# Patient Record
Sex: FEMALE | Race: WHITE | NOT HISPANIC OR LATINO | Employment: OTHER | URBAN - METROPOLITAN AREA
[De-identification: names, ages, dates, MRNs, and addresses within clinical notes are randomized per-mention and may not be internally consistent; named-entity substitution may affect disease eponyms.]

---

## 2017-05-24 NOTE — PATIENT INSTRUCTIONS
Discuss getting a shingles (Zostavax) vaccine and a whooping cough (Tdap) vaccine from your pharmacist, or schedule an ancillary shot visit here.  Some insurances do not cover the cost of these vaccines.      Please schedule a DEXA bone scan for osteoporosis testing at your convenience.  Call our appointment line at [phone] or go to [email or web address].    Jocelin Guajardo MD      [facility]    If you have any questions regarding to your visit please contact your care team:       Team Red:   Clinic Hours Telephone Number   Dr. Jocelin Mcmillan  (pediatrics)  Yesenia Hodge NP 7am-7pm  Monday - Thursday   7am-5pm  Fridays  [phone] (fax)    Edita POND  [phone]   Urgent Care - [place] and [place] Monday-Friday  [place] - 11am-8pm  Saturday-Sunday  Both sites - 9am-5pm  [phone] - [facility]  [phone] [facility]       What options do I have for visits at the clinic other than the traditional office visit?  To expand how we care for you, many of our providers are utilizing electronic visits (e-visits) and telephone visits, when medically appropriate, for interactions with their patients rather than a visit in the clinic.   We also offer nurse visits for many medical concerns. Just like any other service, we will bill your insurance company for this type of visit based on time spent on the phone with your provider. Not all insurance companies cover these visits. Please check with your medical insurance if this type of visit is covered. You will be responsible for any charges that are not paid by your insurance.      E-visits via [employer].:  generally incur a $35.00 fee.  Telephone visits:  Time spent on the phone: *charged based on time that is spent on the phone in increments of 10 minutes. Estimated cost:   5-10 mins $30.00   11-20 mins. $59.00   21-30 mins. $85.00     As always, Thank you for trusting us with your health care  needs!        Preventive Health Recommendations  Female Ages 65 +    Yearly exam:     See your health care provider every year in order to  o Review health changes.   o Discuss preventive care.    o Review your medicines if your doctor has prescribed any.      You no longer need a yearly Pap test unless you've had an abnormal Pap test in the past 10 years. If you have vaginal symptoms, such as bleeding or discharge, be sure to talk with your provider about a Pap test.      Every 1 to 2 years, have a mammogram.  If you are over 69, talk with your health care provider about whether or not you want to continue having screening mammograms.      Every 10 years, have a colonoscopy. Or, have a yearly FIT test (stool test). These exams will check for colon cancer.       Have a cholesterol test every 5 years, or more often if your doctor advises it.       Have a diabetes test (fasting glucose) every three years. If you are at risk for diabetes, you should have this test more often.       At age 65, have a bone density scan (DEXA) to check for osteoporosis (brittle bone disease).    Shots:    Get a flu shot each year.    Get a tetanus shot every 10 years.    Talk to your doctor about your pneumonia vaccines. There are now two you should receive - Pneumovax (PPSV 23) and Prevnar (PCV 13).    Talk to your doctor about the shingles vaccine.    Talk to your doctor about the hepatitis B vaccine.    Nutrition:     Eat at least 5 servings of fruits and vegetables each day.      Eat whole-grain bread, whole-wheat pasta and brown rice instead of white grains and rice.      Talk to your provider about Calcium and Vitamin D.     Lifestyle    Exercise at least 150 minutes a week (30 minutes a day, 5 days a week). This will help you control your weight and prevent disease.      Limit alcohol to one drink per day.      No smoking.       Wear sunscreen to prevent skin cancer.       See your dentist twice a year for an exam and  cleaning.      See your eye doctor every 1 to 2 years to screen for conditions such as glaucoma, macular degeneration and cataracts.    Discharged by Nina Mg MA.

## 2017-06-13 ENCOUNTER — OFFICE VISIT (OUTPATIENT)
Dept: FAMILY MEDICINE | Facility: CLINIC | Age: 82
End: 2017-06-13
Payer: COMMERCIAL

## 2017-06-13 VITALS
SYSTOLIC BLOOD PRESSURE: 134 MMHG | BODY MASS INDEX: 25.52 KG/M2 | DIASTOLIC BLOOD PRESSURE: 82 MMHG | TEMPERATURE: 99.1 F | HEART RATE: 87 BPM | HEIGHT: 63 IN | WEIGHT: 144 LBS | OXYGEN SATURATION: 96 %

## 2017-06-13 DIAGNOSIS — Z71.89 ADVANCED DIRECTIVES, COUNSELING/DISCUSSION: ICD-10-CM

## 2017-06-13 DIAGNOSIS — Z78.0 ASYMPTOMATIC POSTMENOPAUSAL STATUS: ICD-10-CM

## 2017-06-13 DIAGNOSIS — R06.09 EXERTIONAL DYSPNEA: ICD-10-CM

## 2017-06-13 DIAGNOSIS — Z00.00 WELLNESS EXAMINATION: Primary | ICD-10-CM

## 2017-06-13 PROBLEM — Z13.6 CARDIOVASCULAR SCREENING; LDL GOAL LESS THAN 160: Status: ACTIVE | Noted: 2017-06-13

## 2017-06-13 PROCEDURE — 99387 INIT PM E/M NEW PAT 65+ YRS: CPT | Performed by: FAMILY MEDICINE

## 2017-06-13 PROCEDURE — 93000 ELECTROCARDIOGRAM COMPLETE: CPT | Performed by: FAMILY MEDICINE

## 2017-06-13 NOTE — MR AVS SNAPSHOT
After Visit Summary   6/13/2017    Brenna Watkins    MRN: [id number]           Patient Information     Date Of Birth          1935        Visit Information        Provider Department      6/13/2017 2:00 PM Jocelin Guajardo MD [facility]        Today's Diagnoses     Wellness examination    -  1    Exertional dyspnea        Asymptomatic postmenopausal status        Advanced directives, counseling/discussion          Care Instructions    Discuss getting a shingles (Zostavax) vaccine and a whooping cough (Tdap) vaccine from your pharmacist, or schedule an ancillary shot visit here.  Some insurances do not cover the cost of these vaccines.      Please schedule a DEXA bone scan for osteoporosis testing at your convenience.  Call our appointment line at [phone] or go to [email or web address].    Jocelin Guajardo MD      [facility]    If you have any questions regarding to your visit please contact your care team:       Team Red:   Clinic Hours Telephone Number   Dr. Jocelin Mcmillan  (pediatrics)  Yesenia Hodge NP 7am-7pm  Monday - Thursday   7am-5pm  Fridays  [phone] (fax)    Edita POND  [phone]   Urgent Care - [place] and [place] Monday-Friday  [place] - 11am-8pm  Saturday-Sunday  Both sites - 9am-5pm  [phone] - [place]   [phone] - [place]       What options do I have for visits at the clinic other than the traditional office visit?  To expand how we care for you, many of our providers are utilizing electronic visits (e-visits) and telephone visits, when medically appropriate, for interactions with their patients rather than a visit in the clinic.   We also offer nurse visits for many medical concerns. Just like any other service, we will bill your insurance company for this type of visit based on time spent on the phone with your provider. Not all insurance companies cover these visits.  Please check with your medical insurance if this type of visit is covered. You will be responsible for any charges that are not paid by your insurance.      E-visits via [employer]:  generally incur a $35.00 fee.  Telephone visits:  Time spent on the phone: *charged based on time that is spent on the phone in increments of 10 minutes. Estimated cost:   5-10 mins $30.00   11-20 mins. $59.00   21-30 mins. $85.00     As always, Thank you for trusting us with your health care needs!        Preventive Health Recommendations  Female Ages 65 +    Yearly exam:     See your health care provider every year in order to  o Review health changes.   o Discuss preventive care.    o Review your medicines if your doctor has prescribed any.      You no longer need a yearly Pap test unless you've had an abnormal Pap test in the past 10 years. If you have vaginal symptoms, such as bleeding or discharge, be sure to talk with your provider about a Pap test.      Every 1 to 2 years, have a mammogram.  If you are over 69, talk with your health care provider about whether or not you want to continue having screening mammograms.      Every 10 years, have a colonoscopy. Or, have a yearly FIT test (stool test). These exams will check for colon cancer.       Have a cholesterol test every 5 years, or more often if your doctor advises it.       Have a diabetes test (fasting glucose) every three years. If you are at risk for diabetes, you should have this test more often.       At age 65, have a bone density scan (DEXA) to check for osteoporosis (brittle bone disease).    Shots:    Get a flu shot each year.    Get a tetanus shot every 10 years.    Talk to your doctor about your pneumonia vaccines. There are now two you should receive - Pneumovax (PPSV 23) and Prevnar (PCV 13).    Talk to your doctor about the shingles vaccine.    Talk to your doctor about the hepatitis B vaccine.    Nutrition:     Eat at least 5 servings of fruits and vegetables each  day.      Eat whole-grain bread, whole-wheat pasta and brown rice instead of white grains and rice.      Talk to your provider about Calcium and Vitamin D.     Lifestyle    Exercise at least 150 minutes a week (30 minutes a day, 5 days a week). This will help you control your weight and prevent disease.      Limit alcohol to one drink per day.      No smoking.       Wear sunscreen to prevent skin cancer.       See your dentist twice a year for an exam and cleaning.      See your eye doctor every 1 to 2 years to screen for conditions such as glaucoma, macular degeneration and cataracts.    Discharged by Nina Mg MA.          Follow-ups after your visit        Follow-up notes from your care team     Return in about 1 year (around 6/13/2018), or if symptoms worsen or fail to improve, for Wellness visit.      Future tests that were ordered for you today     Open Future Orders        Priority Expected Expires Ordered    XR Chest 2 Views Routine 6/13/2017 6/13/2018 6/13/2017    Exercise Stress Echocardiogram Routine  6/13/2018 6/13/2017    DEXA HIP/PELVIS/SPINE - Future Routine  5/24/2018 6/13/2017            Who to contact     If you have questions or need follow up information about today's clinic visit or your schedule please contact [facility] directly at [phone].  Normal or non-critical lab and imaging results will be communicated to you by [email or web address], letter or phone within 4 business days after the clinic has received the results. If you do not hear from us within 7 days, please contact the clinic through [email or web address] or phone. If you have a critical or abnormal lab result, we will notify you by phone as soon as possible.  Submit refill requests through [employer] or call your pharmacy and they will forward the refill request to us. Please allow 3 business days for your refill to be completed.          Additional Information About Your Visit        [employer] Information     [employer] lets you send  "messages to your doctor, view your test results, renew your prescriptions, schedule appointments and more. To sign up, go to [email or web address] . Click on \"Log in\" on the left side of the screen, which will take you to the Welcome page. Then click on \"Sign up Now\" on the right side of the page.     You will be asked to enter the access code listed below, as well as some personal information. Please follow the directions to create your username and password.     Your access code is: [email or web address]  Expires: 2017  3:36 PM     Your access code will  in 90 days. If you need help or a new code, please call your [facility] clinic or [phone].        Care EveryWhere ID     This is your [facility] EveryWhere ID. This could be used by other organizations to access your [facility] medical records  [id number]        Your Vitals Were     Pulse Temperature Height Pulse Oximetry BMI (Body Mass Index)       87 99.1  F (37.3  C) (Oral) 5' 3\" (1.6 m) 96% 25.51 kg/m2        Blood Pressure from Last 3 Encounters:   17 134/82    Weight from Last 3 Encounters:   17 144 lb (65.3 kg)              We Performed the Following     CBC with platelets differential     EKG 12-lead complete w/read - Clinics        Primary Care Provider Office Phone # Fax #    Jocelin Guajardo [phone]       [address] [address]        Thank you!     Thank you for choosing [facility]  for your care. Our goal is always to provide you with excellent care. Hearing back from our patients is one way we can continue to improve our services. Please take a few minutes to complete the written survey that you may receive in the mail after your visit with us. Thank you!             Your Updated Medication List - Protect others around you: Learn how to safely use, store and throw away your medicines at www.disposemymeds.org.      Notice  As of 2017  3:36 PM    You have not " been prescribed any medications.

## 2017-06-13 NOTE — PROGRESS NOTES
SUBJECTIVE:                                                            Brenna Watkins is a 82 year old female who presents for Preventive Visit.    She has mild exertional dyspnea for months.     Healthy Habits:    Do you get at least three servings of calcium containing foods daily (dairy, green leafy vegetables, etc.)? tries    Amount of exercise or daily activities, outside of work: 7 day(s) per week    Problems taking medications regularly not applicable    Medication side effects: No    Have you had an eye exam in the past two years? yes    Do you see a dentist twice per year? Once per year    Do you have sleep apnea, excessive snoring or daytime drowsiness?no    COGNITIVE SCREEN  1) Repeat 3 items (Banana, Sunrise, Chair)    2) Clock draw: ABNORMAL   3) 3 item recall: Recalls 1 object   Results: ABNORMAL clock, 1-2 items recalled: PROBABLE COGNITIVE IMPAIRMENT, **INFORM PROVIDER**  Mini-CogTM Copyright S [place]. Licensed by the author for use in [facility]; reprinted with permission (zechariah@[facility]). All rights reserved.        Amount of exercise or daily activities, outside of work: 7 day(s) per week    Problems taking medications regularly No    Medication side effects: none    Diet: regular (no restrictions)    Social History   Substance Use Topics     Smoking status: Never Smoker     Smokeless tobacco: Not on file     Alcohol use 0.6 - 1.2 oz/week     1 - 2 Glasses of wine per week        Reviewed and updated as needed this visit by clinical staff  Tobacco  Allergies  Meds  Problems  Med Hx  Surg Hx  Fam Hx  Soc Hx          Reviewed and updated as needed this visit by Provider  Tobacco  Allergies  Meds  Problems  Med Hx  Surg Hx  Fam Hx  Soc Hx         Social History   Substance Use Topics     Smoking status: Never Smoker     Smokeless tobacco: Not on file     Alcohol use 0.6 - 1.2 oz/week     1 - 2 Glasses of wine per week       The patient does not drink >3 drinks per day nor  >7 drinks per week.    Today's PHQ-2 Score:   PHQ-2 ( 1999 Pfizer) 6/13/2017   Q1: Little interest or pleasure in doing things 0   Q2: Feeling down, depressed or hopeless 0   PHQ-2 Score 0       Do you feel safe in your environment - Yes    Do you have a Health Care Directive?: Yes: Patient states has Advance Directive and will bring in a copy to clinic.    Current providers sharing in care for this patient include:   Patient Care Team:  Jocelin Guajardo MD as PCP - General (Family Practice)      Hearing impairment: No    Ability to successfully perform activities of daily living: Yes, no assistance needed     Fall risk:  Fallen 2 or more times in the past year?: No  Any fall with injury in the past year?: No    Home safety:  none identified      The following health maintenance items are reviewed in Epic and correct as of today:  Health Maintenance   Topic Date Due     FALL RISK ASSESSMENT  02/08/2002     DEXA SCAN SCREENING (SYSTEM ASSIGNED)  02/08/2002     INFLUENZA VACCINE (SYSTEM ASSIGNED)  09/01/2017     ADVANCE DIRECTIVE PLANNING Q5 YRS  06/13/2022     TETANUS IMMUNIZATION (SYSTEM ASSIGNED)  06/13/2027     PNEUMOCOCCAL  Addressed         ROS:  C: NEGATIVE for fever, chills, change in weight  I: NEGATIVE for worrisome rashes, moles or lesions  E: NEGATIVE for vision changes or irritation  E/M: NEGATIVE for ear, mouth and throat problems  RESP:as above  B: NEGATIVE for masses, tenderness or discharge  CV: NEGATIVE for chest pain, palpitations or peripheral edema  GI: NEGATIVE for nausea, abdominal pain, heartburn, or change in bowel habits  : NEGATIVE for frequency, dysuria, or hematuria  M: NEGATIVE for significant arthralgias or myalgia  N: NEGATIVE for weakness, dizziness or paresthesias  E: NEGATIVE for temperature intolerance, skin/hair changes  H: NEGATIVE for bleeding problems  P: NEGATIVE for changes in mood or affect    Problem list, Medication list, Allergies, and Medical/Social/Surgical histories  "reviewed in [facility] and updated as appropriate.  OBJECTIVE:                                                            /82 (BP Location: Right arm, Patient Position: Chair, Cuff Size: Adult Regular)  Pulse 87  Temp 99.1  F (37.3  C) (Oral)  Ht 5' 3\" (1.6 m)  Wt 144 lb (65.3 kg)  SpO2 96%  BMI 25.51 kg/m2 Estimated body mass index is 25.51 kg/(m^2) as calculated from the following:    Height as of this encounter: 5' 3\" (1.6 m).    Weight as of this encounter: 144 lb (65.3 kg).  EXAM:   GENERAL: healthy, alert and no distress  EYES: Eyes grossly normal to inspection, PERRL and conjunctivae and sclerae normal  HENT: ear canals and TM's normal, nose and mouth without ulcers or lesions  NECK: no adenopathy, no asymmetry, masses, or scars and thyroid normal to palpation  RESP: lungs clear to auscultation - no rales, rhonchi or wheezes  CV: regular rate and rhythm, normal S1 S2, no S3 or S4, no murmur, click or rub, no peripheral edema and peripheral pulses strong  ABDOMEN: soft, nontender, no hepatosplenomegaly, no masses and bowel sounds normal  MS: no gross musculoskeletal defects noted, no edema  SKIN: no suspicious lesions or rashes  NEURO: Normal strength and tone, mentation intact and speech normal  PSYCH: mentation appears normal, affect normal/bright    ASSESSMENT / PLAN:                                                            (Z00.00) Wellness examination  (primary encounter diagnosis)    (R06.09) Exertional dyspnea  Plan: EKG 12-lead complete w/read - Clinics, XR Chest        2 Views, Exercise Stress Echocardiogram, CBC         with platelets differential        Follow-up pending results      End of Life Planning:  Patient currently has an advanced directive: Yes.  Practitioner is supportive of decision.    COUNSELING:  Reviewed preventive health counseling, as reflected in patient instructions  Special attention given to:       Regular exercise       Healthy diet/nutrition       Osteoporosis " "Prevention/Bone Health       The ASCVD Risk score (Narayan MCGARRY Jr, et al., 2013) failed to calculate for the following reasons:    The 2013 ASCVD risk score is only valid for ages 40 to 79       Advanced Planning     BP Screening:   Last 3 BP Readings:    BP Readings from Last 3 Encounters:   06/13/17 134/82       The following was recommended to the patient:  Re-screen BP within a year and recommended lifestyle modifications    Estimated body mass index is 25.51 kg/(m^2) as calculated from the following:    Height as of this encounter: 5' 3\" (1.6 m).    Weight as of this encounter: 144 lb (65.3 kg).     reports that she has never smoked. She does not have any smokeless tobacco history on file.      Appropriate preventive services were discussed with this patient, including applicable screening as appropriate for cardiovascular disease, diabetes, osteopenia/osteoporosis, and glaucoma.  As appropriate for age/gender, discussed screening for colorectal cancer, prostate cancer, breast cancer, and cervical cancer. Checklist reviewing preventive services available has been given to the patient.    Reviewed patients plan of care and provided an AVS. The Basic Care Plan (routine screening as documented in Health Maintenance) for Brenna meets the Care Plan requirement. This Care Plan has been established and reviewed with the Patient.    Counseling Resources:  ATP IV Guidelines  Pooled Cohorts Equation Calculator  Breast Cancer Risk Calculator  FRAX Risk Assessment  ICSI Preventive Guidelines  Dietary Guidelines for Americans, 2010  USDA's MyPlate  ASA Prophylaxis  Lung CA Screening    Jocelin Guajardo MD  [facility]  "

## 2017-07-05 ENCOUNTER — RADIANT APPOINTMENT (OUTPATIENT)
Dept: BONE DENSITY | Facility: CLINIC | Age: 82
End: 2017-07-05
Attending: FAMILY MEDICINE
Payer: COMMERCIAL

## 2017-07-05 ENCOUNTER — TELEPHONE (OUTPATIENT)
Dept: FAMILY MEDICINE | Facility: CLINIC | Age: 82
End: 2017-07-05

## 2017-07-05 ENCOUNTER — ALLIED HEALTH/NURSE VISIT (OUTPATIENT)
Dept: NURSING | Facility: CLINIC | Age: 82
End: 2017-07-05
Payer: COMMERCIAL

## 2017-07-05 DIAGNOSIS — Z78.0 ASYMPTOMATIC POSTMENOPAUSAL STATUS: ICD-10-CM

## 2017-07-05 DIAGNOSIS — I10 HTN (HYPERTENSION): Primary | ICD-10-CM

## 2017-07-05 PROBLEM — R06.09 EXERTIONAL DYSPNEA: Status: ACTIVE | Noted: 2017-07-05

## 2017-07-05 PROBLEM — R03.0 ELEVATED BLOOD PRESSURE READING WITHOUT DIAGNOSIS OF HYPERTENSION: Status: ACTIVE | Noted: 2017-07-05

## 2017-07-05 PROCEDURE — 99207 ZZC NO CHARGE NURSE ONLY: CPT

## 2017-07-05 PROCEDURE — 77080 DXA BONE DENSITY AXIAL: CPT | Mod: 59 | Performed by: INTERNAL MEDICINE

## 2017-07-05 PROCEDURE — 77081 DXA BONE DENSITY APPENDICULR: CPT | Performed by: INTERNAL MEDICINE

## 2017-07-05 NOTE — MR AVS SNAPSHOT
After Visit Summary   7/5/2017    Brenna Watkins    MRN: [id number]           Patient Information     Date Of Birth          1935        Visit Information        Provider Department      7/5/2017 10:15 AM DARIUS POND [facility]        Today's Diagnoses     HTN (hypertension)    -  1       Follow-ups after your visit        Follow-up notes from your care team     Return in about 5 days (around 7/10/2017) for BP Recheck.      Your next 10 appointments already scheduled     Jul 05, 2017 11:00 AM CDT   DX HIP/PELVIS/SPINE with FKDX1   [facility] ([facility])    [address] [place] [address] [id number] [phone]           Please do not take any of the following 24 hours prior to the day of your exam: vitamins, calcium tablets, antacids.            Jul 10, 2017  2:20 PM CDT   SHORT with Jocelin Guajardo MD   [facility] ([facility])    [address] [place] [phone]              Who to contact     If you have questions or need follow up information about today's clinic visit or your schedule please contact [facility] directly at [phone].  Normal or non-critical lab and imaging results will be communicated to you by Enablence Technologieshart, letter or phone within 4 business days after the clinic has received the results. If you do not hear from us within 7 days, please contact the clinic through [employer]hart or phone. If you have a critical or abnormal lab result, we will notify you by phone as soon as possible.  Submit refill requests through [employer] or call your pharmacy and they will forward the refill request to us. Please allow 3 business days for your refill to be completed.          Additional Information About Your Visit        En[employer]ence TechnologiesharEnhatch Information     i-Optics lets you send messages to your doctor, view your test results, renew your prescriptions, schedule appointments and more. To sign up, go to  "[email or web address] . Click on \"Log in\" on the left side of the screen, which will take you to the Welcome page. Then click on \"Sign up Now\" on the right side of the page.     You will be asked to enter the access code listed below, as well as some personal information. Please follow the directions to create your username and password.     Your access code is: [email or web address]  Expires: 2017  3:36 PM     Your access code will  in 90 days. If you need help or a new code, please call your [facility] clinic or [phone].        Care EveryWhere ID     This is your Care EveryWhere ID. This could be used by other organizations to access your [facility] medical records  [id number]         Blood Pressure from Last 3 Encounters:   17 134/82    Weight from Last 3 Encounters:   17 144 lb (65.3 kg)              Today, you had the following     No orders found for display       Primary Care Provider Office Phone # Fax #    Jocelin Guajardo [phone]       [facility] [address] [place] [address] [address]        Equal Access to Services     LIOR GARCIAS : Hadii aad ku hadasho Soomaali, waaxda luqadaha, qaybta kaalmada adeegyada, armani pollock hayrickn gumaro bridges . So [facility] [phone].    ATENCIÓN: Si habla español, tiene a james disposición servicios gratuitos de asistencia lingüística. Llame al [phone].    We comply with applicable federal civil rights laws and [place] laws. We do not discriminate on the basis of race, color, national origin, age, disability sex, sexual orientation or gender identity.            Thank you!     Thank you for choosing [facility]  for your care. Our goal is always to provide you with excellent care. Hearing back from our patients is one way we can continue to improve our services. Please take a few minutes to complete the written survey that you may receive in the mail after your visit with us. Thank you!           "   Your Updated Medication List - Protect others around you: Learn how to safely use, store and throw away your medicines at www.disposemymeds.org.      Notice  As of 7/5/2017 10:50 AM    You have not been prescribed any medications.

## 2017-07-05 NOTE — PROGRESS NOTES
Patient was sent over after trying to have a stress test done.  Patient was unable to have testing due to BP- 204/109.  Per patient this was taken from machine not manually.  BP upon arriving was 162/80, after resting patient BP was 144/76 P-72.  Huddled with provider, patient should schedule visit with provider to address HTN before stress test.  Patient is scheduled for visit on 7/10/17.  Alyssia Robbins RN

## 2017-07-05 NOTE — LETTER
[address] [place] [address]    July 10, 2017    Brenna Watkins  [address] [facility]  [place] [address]          Dear Brenna,    Your bone mass is mildly low, called osteopenia. We can continue to follow this by repeating the DEXA test in 3 to 5 years.  Get several servings of dairy daily (or calcium 1000 - 1200 mg daily split into 2 doses), take vitamin D 1000 units daily over-the-counter, exercise regularly, and avoid falls.      Enclosed is a copy of your results.     Results for orders placed or performed in visit on 17   DEXA HIP/PELVIS/SPINE - Future    Narrative    BONE DENSITOMETRY  [address]  [place] [address]  2017      PATIENT: Brenna Watkins  CHART: [id number]   :  1935  AGE:  82 year old  SEX:  female   REFERRING PROVIDER:  Jocelin Guajardo MD     PROCEDURE:  Bone density scanning was performed using DXA technology of   the lumbar spine and hip.  Scanning was performed on a Lunar Prodigy   scanner.  Reporting is completed in the form of a T-score.  The T-score   represents the standard deviation from peak bone mass based on a young   healthy adult.     REFERENCE T-SCORES:       Normal                -1.0 and greater                                 Osteopenia         Between -1.0 and -2.5                                             Osteoporosis     -2.5 and less                                       RISK FACTORS:  Post-menopausal    CURRENT TREATMENT:  none listed     FINDINGS:               Left Femoral Neck T-score:  -2.4               Right Femoral Neck T-score:  -2.3               Forearm (radius 33%) T-score:  -0.9  The spine is not acceptable for evaluation due to previous surgical   changes.                    Total Hip Mean BMD: 0.823               Forearm (radius 33%) BMD: 0.650    IMPRESSION  Osteopenia.    Recommendations include ensuring  "adequate Calcium and Vitamin D.    The current NOF Guidelines recommend treatment for patients with prior hip   or vertebral fracture, T-score -2.5 or below, or 10 year risk of any major   osteoporotic fracture >20% or 10 year risk of hip fracture >3%, as   calculated using the FRAX calculator (www.shef.ac.uk/FRAX or you can   google \"FRAX\").      This patient's risks based on available information, with the use of FRAX,   are 19 % for major osteoporotic fracture and 6.5 % for hip fracture.   Based on these guidelines, treatment (in addition to calcium and vitamin   D) is recommended for this patient, after ruling out other causes of   osteoporosis.  This is meant as an aid to clinical decision making; one must still use   clinical judgement.      Follow up can be considered in 2 years.   ___________________  Nenita Smith M.D.  Electronically signed          If you have any questions or concerns, please call myself or my nurse at [phone].      Sincerely,        Jocelin Guajardo MD /[place]    "

## 2017-07-05 NOTE — TELEPHONE ENCOUNTER
----- Message from Kathie Damian RDCS sent at 7/5/2017  9:13 AM CDT -----  Regarding: stress echo  Dr. Guajardo,    Brenna was in [place] this morning for a stress echo which we were unable to start because her BP was too high (204/109 at rest).  The cardiologist recommended getting Brenna's BP under control and then sending her to try a stress test again.  We told her that you would be in contact with her to let her know what to do next.      Kathie Damian RDCS

## 2017-07-06 NOTE — PATIENT INSTRUCTIONS
Call the imaging center at [phone] or  [phone] to schedule your stress echocardiogram.    [facility] ([facility])    If you have any questions regarding to your visit please contact your care team:       Team Red:   Clinic Hours Telephone Number   Dr. Jocelin Mcmillan  (pediatrics)  Yesenia Hodge NP 7am-7pm  Monday - Thursday   7am-5pm  Fridays  [phone] (fax)    Edita POND  [phone]   Urgent Care - [place] and [place] Monday-Friday  [place] - 11am-8pm  Saturday-Sunday  Both sites - 9am-5pm  [phone] - [facility]  [phone] - [place]       What options do I have for visits at the clinic other than the traditional office visit?  To expand how we care for you, many of our providers are utilizing electronic visits (e-visits) and telephone visits, when medically appropriate, for interactions with their patients rather than a visit in the clinic.   We also offer nurse visits for many medical concerns. Just like any other service, we will bill your insurance company for this type of visit based on time spent on the phone with your provider. Not all insurance companies cover these visits. Please check with your medical insurance if this type of visit is covered. You will be responsible for any charges that are not paid by your insurance.      E-visits via [employer]:  generally incur a $35.00 fee.  Telephone visits:  Time spent on the phone: *charged based on time that is spent on the phone in increments of 10 minutes. Estimated cost:   5-10 mins $30.00   11-20 mins. $59.00   21-30 mins. $85.00     As always, Thank you for trusting us with your health care needs!

## 2017-07-06 NOTE — PROGRESS NOTES
"  SUBJECTIVE:                                                    Brenna Watkins is a 82 year old female who presents to clinic today for the following health issues:    Hypertension Follow-up      Outpatient blood pressure was 204/109 after IV placement at attempted stress test     Low Salt Diet: not monitoring salt      Amount of exercise or physical activity: 4-5 days/week for an average of greater than 60 minutes    Problems taking medications regularly: No    Medication side effects: none    Diet: regular (no restrictions)    She has ongoing mild exertional dyspnea.     ROS:  C: NEGATIVE for fever, chills, change in weight  RESP:as above  CV: NEGATIVE for chest pain, palpitations or peripheral edema  H: NEGATIVE for bleeding problems  P: NEGATIVE for changes in mood or affect    OBJECTIVE:     /84  Pulse 77  Temp 98.7  F (37.1  C) (Oral)  Ht 5' 3\" (1.6 m)  Wt 142 lb (64.4 kg)  SpO2 97%  Breastfeeding? No  BMI 25.15 kg/m2  Body mass index is 25.15 kg/(m^2).  GENERAL: healthy, alert and no distress  MS: extremities normal- no gross deformities noted  PSYCH: mentation appears normal, affect normal/bright    Diagnostic Test Results:  Results for orders placed or performed in visit on 17   DEXA HIP/PELVIS/SPINE - Future    Narrative    BONE DENSITOMETRY  [address] [address]  2017      PATIENT: Brenna Watkins  CHART: [id number]   :  1935  AGE:  82 year old  SEX:  female   REFERRING PROVIDER:  Jocelin Guajardo MD     PROCEDURE:  Bone density scanning was performed using DXA technology of   the lumbar spine and hip.  Scanning was performed on a Lunar Prodigy   scanner.  Reporting is completed in the form of a T-score.  The T-score   represents the standard deviation from peak bone mass based on a young   healthy adult.     REFERENCE T-SCORES:       Normal                -1.0 and greater                                 Osteopenia         " "Between -1.0 and -2.5                                             Osteoporosis     -2.5 and less                                       RISK FACTORS:  Post-menopausal    CURRENT TREATMENT:  none listed     FINDINGS:               Left Femoral Neck T-score:  -2.4               Right Femoral Neck T-score:  -2.3               Forearm (radius 33%) T-score:  -0.9  The spine is not acceptable for evaluation due to previous surgical   changes.                    Total Hip Mean BMD: 0.823               Forearm (radius 33%) BMD: 0.650    IMPRESSION  Osteopenia.    Recommendations include ensuring adequate Calcium and Vitamin D.    The current NOF Guidelines recommend treatment for patients with prior hip   or vertebral fracture, T-score -2.5 or below, or 10 year risk of any major   osteoporotic fracture >20% or 10 year risk of hip fracture >3%, as   calculated using the FRAX calculator (www.shef.ac.uk/FRAX or you can   google \"FRAX\").      This patient's risks based on available information, with the use of FRAX,   are 19 % for major osteoporotic fracture and 6.5 % for hip fracture.   Based on these guidelines, treatment (in addition to calcium and vitamin   D) is recommended for this patient, after ruling out other causes of   osteoporosis.  This is meant as an aid to clinical decision making; one must still use   clinical judgement.      Follow up can be considered in 2 years.   ___________________  Nenita Smith M.D.  Electronically signed          ASSESSMENT/PLAN:   (R06.09) Exertional dyspnea  (primary encounter diagnosis)  Plan: CBC with platelets differential, XR Chest 2         Views, Exercise Stress Echocardiogram          (R03.0) Elevated blood pressure reading without diagnosis of hypertension  Comment: resolved   Plan: Exercise Stress Echocardiogram          (M85.80) Osteopenia, unspecified location  Plan: Vitamin D Deficiency        Continue vitamin D supplement, regular exercise, falls precautions and DEXA bone " scan every 3 - 5 years.       See Patient Instructions    Jocelin Guajardo MD  [facility]

## 2017-07-10 ENCOUNTER — RADIANT APPOINTMENT (OUTPATIENT)
Dept: GENERAL RADIOLOGY | Facility: CLINIC | Age: 82
End: 2017-07-10
Attending: FAMILY MEDICINE
Payer: COMMERCIAL

## 2017-07-10 ENCOUNTER — OFFICE VISIT (OUTPATIENT)
Dept: FAMILY MEDICINE | Facility: CLINIC | Age: 82
End: 2017-07-10
Payer: COMMERCIAL

## 2017-07-10 VITALS
TEMPERATURE: 98.7 F | SYSTOLIC BLOOD PRESSURE: 128 MMHG | BODY MASS INDEX: 25.16 KG/M2 | WEIGHT: 142 LBS | DIASTOLIC BLOOD PRESSURE: 84 MMHG | HEART RATE: 77 BPM | OXYGEN SATURATION: 97 % | HEIGHT: 63 IN

## 2017-07-10 DIAGNOSIS — R03.0 ELEVATED BLOOD PRESSURE READING WITHOUT DIAGNOSIS OF HYPERTENSION: ICD-10-CM

## 2017-07-10 DIAGNOSIS — R06.09 EXERTIONAL DYSPNEA: ICD-10-CM

## 2017-07-10 DIAGNOSIS — R06.09 EXERTIONAL DYSPNEA: Primary | ICD-10-CM

## 2017-07-10 DIAGNOSIS — M85.80 OSTEOPENIA, UNSPECIFIED LOCATION: ICD-10-CM

## 2017-07-10 LAB
BASOPHILS # BLD AUTO: 0 10E9/L (ref 0–0.2)
BASOPHILS NFR BLD AUTO: 0.2 %
DIFFERENTIAL METHOD BLD: NORMAL
EOSINOPHIL # BLD AUTO: 0 10E9/L (ref 0–0.7)
EOSINOPHIL NFR BLD AUTO: 0.9 %
ERYTHROCYTE [DISTWIDTH] IN BLOOD BY AUTOMATED COUNT: 13.5 % (ref 10–15)
HCT VFR BLD AUTO: 41 % (ref 35–47)
HGB BLD-MCNC: 13.3 G/DL (ref 11.7–15.7)
LYMPHOCYTES # BLD AUTO: 1.8 10E9/L (ref 0.8–5.3)
LYMPHOCYTES NFR BLD AUTO: 37.6 %
MCH RBC QN AUTO: 30.5 PG (ref 26.5–33)
MCHC RBC AUTO-ENTMCNC: 32.4 G/DL (ref 31.5–36.5)
MCV RBC AUTO: 94 FL (ref 78–100)
MONOCYTES # BLD AUTO: 0.5 10E9/L (ref 0–1.3)
MONOCYTES NFR BLD AUTO: 10.5 %
NEUTROPHILS # BLD AUTO: 2.4 10E9/L (ref 1.6–8.3)
NEUTROPHILS NFR BLD AUTO: 50.8 %
PLATELET # BLD AUTO: 246 10E9/L (ref 150–450)
RBC # BLD AUTO: 4.36 10E12/L (ref 3.8–5.2)
WBC # BLD AUTO: 4.7 10E9/L (ref 4–11)

## 2017-07-10 PROCEDURE — 99213 OFFICE O/P EST LOW 20 MIN: CPT | Performed by: FAMILY MEDICINE

## 2017-07-10 PROCEDURE — 36415 COLL VENOUS BLD VENIPUNCTURE: CPT | Performed by: FAMILY MEDICINE

## 2017-07-10 PROCEDURE — 85025 COMPLETE CBC W/AUTO DIFF WBC: CPT | Performed by: FAMILY MEDICINE

## 2017-07-10 PROCEDURE — 82306 VITAMIN D 25 HYDROXY: CPT | Performed by: FAMILY MEDICINE

## 2017-07-10 PROCEDURE — 71020 XR CHEST 2 VW: CPT

## 2017-07-10 NOTE — PROGRESS NOTES
Mail letter:  Your bone mass is mildly low, called osteopenia. We can continue to follow this by repeating the DEXA test in 3 to 5 years.  Get several servings of dairy daily (or calcium 1000 - 1200 mg daily split into 2 doses), take vitamin D 1000 units daily over-the-counter, exercise regularly, and avoid falls.      Jocelin Guajardo MD

## 2017-07-10 NOTE — LETTER
[address] [place] [address]    July 11, 2017    Brenna Watkins  [address] [facility] [address] [facility] [address]  [place] [address]          Dear Brenna,      Your results are normal    Enclosed is a copy of your results.     Results for orders placed or performed in visit on 07/10/17   Vitamin D Deficiency   Result Value Ref Range    Vitamin D Deficiency screening 39 20 - 75 ug/L   CBC with platelets differential   Result Value Ref Range    WBC 4.7 4.0 - 11.0 10e9/L    RBC Count 4.36 3.8 - 5.2 10e12/L    Hemoglobin 13.3 11.7 - 15.7 g/dL    Hematocrit 41.0 35.0 - 47.0 %    MCV 94 78 - 100 fl    MCH 30.5 26.5 - 33.0 pg    MCHC 32.4 31.5 - 36.5 g/dL    RDW 13.5 10.0 - 15.0 %    Platelet Count 246 150 - 450 10e9/L    Diff Method Automated Method     % Neutrophils 50.8 %    % Lymphocytes 37.6 %    % Monocytes 10.5 %    % Eosinophils 0.9 %    % Basophils 0.2 %    Absolute Neutrophil 2.4 1.6 - 8.3 10e9/L    Absolute Lymphocytes 1.8 0.8 - 5.3 10e9/L    Absolute Monocytes 0.5 0.0 - 1.3 10e9/L    Absolute Eosinophils 0.0 0.0 - 0.7 10e9/L    Absolute Basophils 0.0 0.0 - 0.2 10e9/L       If you have any questions or concerns, please call myself or my nurse at [phone].      Sincerely,        Jocelin Guajardo MD/HA

## 2017-07-10 NOTE — MR AVS SNAPSHOT
After Visit Summary   7/10/2017    Brenna Watkins    MRN: [id number]           Patient Information     Date Of Birth          1935        Visit Information        Provider Department      7/10/2017 2:20 PM Jocelin Guajardo MD [facility]        Today's Diagnoses     Exertional dyspnea    -  1    Elevated blood pressure reading without diagnosis of hypertension        Osteopenia, unspecified location          Care Instructions    Call the imaging center at [phone] or  [phone] to schedule your stress echocardiogram.    [facility]    If you have any questions regarding to your visit please contact your care team:       Team Red:   Clinic Hours Telephone Number   Dr. Jocelin Mcmillan  (pediatrics)  Yesenia Hodge NP 7am-7pm  Monday - Thursday   7am-5pm  Fridays  [phone] (fax)    Edita POND  [phone]   Urgent Care - [place] and [place] Monday-Friday  [place] - 11am-8pm  Saturday-Sunday  Both sites - 9am-5pm  [phone] - [facility]  [phone] - [place]       What options do I have for visits at the clinic other than the traditional office visit?  To expand how we care for you, many of our providers are utilizing electronic visits (e-visits) and telephone visits, when medically appropriate, for interactions with their patients rather than a visit in the clinic.   We also offer nurse visits for many medical concerns. Just like any other service, we will bill your insurance company for this type of visit based on time spent on the phone with your provider. Not all insurance companies cover these visits. Please check with your medical insurance if this type of visit is covered. You will be responsible for any charges that are not paid by your insurance.      E-visits via [employer]:  generally incur a $35.00 fee.  Telephone visits:  Time spent on the phone: *charged based on time that is spent on  "the phone in increments of 10 minutes. Estimated cost:   5-10 mins $30.00   11-20 mins. $59.00   21-30 mins. $85.00     As always, Thank you for trusting us with your health care needs!                      Follow-ups after your visit        Follow-up notes from your care team     Return in about 1 year (around 7/10/2018), or if symptoms worsen or fail to improve, for Wellness visit.      Future tests that were ordered for you today     Open Future Orders        Priority Expected Expires Ordered    XR Chest 2 Views Routine 7/10/2017 7/10/2018 7/10/2017    Exercise Stress Echocardiogram Routine  7/10/2018 7/10/2017            Who to contact     If you have questions or need follow up information about today's clinic visit or your schedule please contact [facility] directly at [phone].  Normal or non-critical lab and imaging results will be communicated to you by MyChart, letter or phone within 4 business days after the clinic has received the results. If you do not hear from us within 7 days, please contact the clinic through [employer] or phone. If you have a critical or abnormal lab result, we will notify you by phone as soon as possible.  Submit refill requests through [employer] or call your pharmacy and they will forward the refill request to us. Please allow 3 business days for your refill to be completed.          Additional Information About Your Visit        MyChart Information     [employer] lets you send messages to your doctor, view your test results, renew your prescriptions, schedule appointments and more. To sign up, go to [email or web address] . Click on \"Log in\" on the left side of the screen, which will take you to the Welcome page. Then click on \"Sign up Now\" on the right side of the page.     You will be asked to enter the access code listed below, as well as some personal information. Please follow the directions to create your username and password.     Your access code is: " [email or web address]  Expires: 2017  3:36 PM     Your access code will  in 90 days. If you need help or a new code, please call your [place] clinic or [phone].        Care EveryWhere ID     This is your Care EveryWhere ID. This could be used by other organizations to access your [place] medical records  [id number]        Your Vitals Were     Pulse Temperature Height Pulse Oximetry Breastfeeding? BMI (Body Mass Index)    77 98.7  F (37.1  C) (Oral) 5' 3\" (1.6 m) 97% No 25.15 kg/m2       Blood Pressure from Last 3 Encounters:   07/10/17 128/84   17 134/82    Weight from Last 3 Encounters:   07/10/17 142 lb (64.4 kg)   17 144 lb (65.3 kg)              We Performed the Following     CBC with platelets differential     Vitamin D Deficiency        Primary Care Provider Office Phone # Fax #    Jocelin Guajardo [phone]       [address] [address]        Equal Access to Services     CHRISTIANA [facility] Center[facility]HSA : Hadii aad ku hadasho Soomaali, waaxda luqadaha, qaybta kaalmada adeegyada, armani pollock hayrickn gumaro bridges . So [facility] [phone].    ATENCIÓN: Si habla español, tiene a james disposición servicios gratuitos de asistencia lingüística. [facility] [phone].    We comply with applicable federal civil rights laws and [place] laws. We do not discriminate on the basis of race, color, national origin, age, disability sex, sexual orientation or gender identity.            Thank you!     Thank you for choosing [facility]  for your care. Our goal is always to provide you with excellent care. Hearing back from our patients is one way we can continue to improve our services. Please take a few minutes to complete the written survey that you may receive in the mail after your visit with us. Thank you!             Your Updated Medication List - Protect others around you: Learn how to safely use, store and throw away your medicines at " www.disposemymeds.org.      Notice  As of 7/10/2017  3:08 PM    You have not been prescribed any medications.

## 2017-07-11 LAB — DEPRECATED CALCIDIOL+CALCIFEROL SERPL-MC: 39 UG/L (ref 20–75)

## 2017-08-16 ENCOUNTER — RADIANT APPOINTMENT (OUTPATIENT)
Dept: CARDIOLOGY | Facility: CLINIC | Age: 82
End: 2017-08-16
Attending: FAMILY MEDICINE
Payer: COMMERCIAL

## 2017-08-16 DIAGNOSIS — R03.0 ELEVATED BLOOD PRESSURE READING WITHOUT DIAGNOSIS OF HYPERTENSION: ICD-10-CM

## 2017-08-16 DIAGNOSIS — R06.09 EXERTIONAL DYSPNEA: ICD-10-CM

## 2017-08-16 PROCEDURE — 40000264 ECHO STRESS WITH OPTISON: Performed by: INTERNAL MEDICINE

## 2017-08-16 PROCEDURE — 93325 DOPPLER ECHO COLOR FLOW MAPG: CPT | Mod: TC | Performed by: INTERNAL MEDICINE

## 2017-08-16 PROCEDURE — 93325 DOPPLER ECHO COLOR FLOW MAPG: CPT | Mod: 26 | Performed by: INTERNAL MEDICINE

## 2017-08-16 PROCEDURE — 93350 STRESS TTE ONLY: CPT | Mod: TC | Performed by: INTERNAL MEDICINE

## 2017-08-16 PROCEDURE — 93321 DOPPLER ECHO F-UP/LMTD STD: CPT | Mod: TC | Performed by: INTERNAL MEDICINE

## 2017-08-16 PROCEDURE — 93350 STRESS TTE ONLY: CPT | Mod: 26 | Performed by: INTERNAL MEDICINE

## 2017-08-16 PROCEDURE — 93018 CV STRESS TEST I&R ONLY: CPT | Performed by: INTERNAL MEDICINE

## 2017-08-16 PROCEDURE — 93016 CV STRESS TEST SUPVJ ONLY: CPT | Performed by: INTERNAL MEDICINE

## 2017-08-16 PROCEDURE — 93017 CV STRESS TEST TRACING ONLY: CPT | Performed by: INTERNAL MEDICINE

## 2017-08-16 PROCEDURE — 93352 ADMIN ECG CONTRAST AGENT: CPT | Performed by: INTERNAL MEDICINE

## 2017-08-16 PROCEDURE — 93321 DOPPLER ECHO F-UP/LMTD STD: CPT | Mod: 26 | Performed by: INTERNAL MEDICINE

## 2017-08-16 RX ADMIN — Medication 3 ML: at 09:30

## 2017-08-16 NOTE — NURSING NOTE
Bicycle Stress Echocardiogram with Optison performed.  IV started in LAC.    Optison:   3ml Optison mixed with 6ml Saline - [id number]  Amount used: 3.0ml, 6.0ml wasted

## 2017-08-16 NOTE — LETTER
[address] [place] [address]    2017    Brenna Watkins  [address]  [place] [address]          Dear Brenna,    Your results are normal    Enclosed is a copy of your results.     Results for orders placed or performed in visit on 17   ECHO STRESS WITH OPTISON    Narrative    [id number]  [id number]  [id number]  [id number]^KRISTAL^JOSE^           [facility], Echocardiography Laboratory  [address] [place] [address], [place]  [place] [address]        Name: BRENNA WATKINS  MRN: [id number]  : 1935  Study Date: 2017 09:03 AM  Age: 82 yrs  Gender: Female  Patient Location: [facility]  Reason For Study: Dyspnea, Elevated blood-pressure reading  Ordering Physician: JOSE GIRALDO  Referring Physician: JOSE GIRALDO  Performed By: Kathie Damian RD     BSA: 1.7 m2  Height: 63 in  Weight: 142 lb  HR: 75  BP: 149/99 mmHg  _____________________________________________________________________________  __        Procedure  Stress Echo Bike with two dimensional, color and spectral Doppler performed.  _____________________________________________________________________________  __        Interpretation Summary  Normal exercise echocardiogram without evidence of inducible ischemia.  Sensitivity of the test is reduced since target heart rate was not achieved.  With stress, the left ventricular ejection fraction increased from 55-60% to  greater than 65% and the left ventricular size decreased appropriately.  There was no ECG evidence of ischemia. Heart rate response to exercise was  normal, with hypertensive BP response.  No subjective symptoms to suggest ischemia.  No significant valve disease on screening doppler evaluation. The aortic root  and visualized ascending aorta are normal.  _____________________________________________________________________________  __     Stress  There was a  hypertensive BP response to exercise.  There was no new ST segment depression.  Limiting [place]: None.  Peak MVO2 18.75 ml/kg/min .  Percent predicted MVO2 89 %.  RPP 58766.  Maximum workload 75 martins.  The patient exhibited dyspnea during exercise.  Normal heart rate response to exercise.  Exercise stopped due to hypertensive response 226/113.     Rest  Normal baseline electrocardiogram.     Stress Results        Protocol:  Bike Stress Echo        Maximum Predicted HR:   140 bpm        Target HR: 119 bpm                 % Maximum Predicted HR: 79 %                             StageDurationHeart Rate  BP                               (mm:ss)   (bpm)                          REST   0:00      75    149/99                          PEAK   3:45     111    226/113                            Stress Duration:   3:45 mm:ss *                      Maximum Stress HR: 111 bpm *     Left Ventricle  Left ventricular systolic function is normal. Ejection Fraction = >55%.     Aortic Valve  The aortic valve is normal in structure and function.     Mitral Valve  The mitral valve is normal in structure and function.        Tricuspid Valve  The tricuspid valve is normal in structure and function.     Right Ventricle  The right ventricular systolic function is normal.     Pericardium  There is no pericardial effusion.     Contrast  Optison (NDC #5374-8943-77) given intravenously. Patient was given 3.0 ml  mixture of 3 ml Optison and 6 ml saline. 6.0 ml wasted. IV start location  LAC .     _____________________________________________________________________________  __  MMode/2D Measurements & Calculations  asc Aorta Diam: 3.4 cm        Doppler Measurements & Calculations  MV E max darius: 64.0 cm/sec  MV A max darius: 86.8 cm/sec  MV E/A: 0.74  MV dec time: 0.19 sec  TR max darius: 278.0 cm/sec  TR max P.6 mmHg              _____________________________________________________________________________  __        Report approved by: Balbir  Win Stack 08/16/2017 01:42 PM          If you have any questions or concerns, please call myself or my nurse at [phone].      Sincerely,        [email or web address]  Jocelin Guajardo MD /DONA

## 2017-08-23 ENCOUNTER — OFFICE VISIT (OUTPATIENT)
Dept: FAMILY MEDICINE | Facility: CLINIC | Age: 82
End: 2017-08-23
Payer: COMMERCIAL

## 2017-08-23 VITALS
DIASTOLIC BLOOD PRESSURE: 80 MMHG | HEIGHT: 63 IN | HEART RATE: 80 BPM | OXYGEN SATURATION: 98 % | SYSTOLIC BLOOD PRESSURE: 128 MMHG | BODY MASS INDEX: 25.16 KG/M2 | RESPIRATION RATE: 16 BRPM | TEMPERATURE: 96.7 F | WEIGHT: 142 LBS

## 2017-08-23 DIAGNOSIS — H60.501 ACUTE OTITIS EXTERNA OF RIGHT EAR, UNSPECIFIED TYPE: Primary | ICD-10-CM

## 2017-08-23 DIAGNOSIS — R06.09 EXERTIONAL DYSPNEA: ICD-10-CM

## 2017-08-23 LAB
FEF 25/75: NORMAL
FEV-1: NORMAL
FEV1/FVC: NORMAL
FVC: NORMAL

## 2017-08-23 PROCEDURE — 99214 OFFICE O/P EST MOD 30 MIN: CPT | Mod: 25 | Performed by: FAMILY MEDICINE

## 2017-08-23 PROCEDURE — 94010 BREATHING CAPACITY TEST: CPT | Performed by: FAMILY MEDICINE

## 2017-08-23 RX ORDER — NEOMYCIN SULFATE, POLYMYXIN B SULFATE AND HYDROCORTISONE 10; 3.5; 1 MG/ML; MG/ML; [USP'U]/ML
4 SUSPENSION/ DROPS AURICULAR (OTIC) 4 TIMES DAILY
Qty: 10 ML | Refills: 0 | Status: SHIPPED | OUTPATIENT
Start: 2017-08-23 | End: 2018-03-21

## 2017-08-23 ASSESSMENT — PAIN SCALES - GENERAL: PAINLEVEL: MILD PAIN (3)

## 2017-08-23 NOTE — PROGRESS NOTES
"  SUBJECTIVE:   Brenna Watkins is a 82 year old female who presents to clinic today for the following health issues:    Ear pain       Duration: 3 weeks    Description (location/character/radiation): right ear    Intensity:  moderate, severe    Accompanying signs and symptoms: trouble sleeping and pain    History (similar episodes/previous evaluation): at urgent care 3 weeks ago and had was removed and now its back and painful    Precipitating or alleviating factors: None    Therapies tried and outcome: urgent care     She has ongoing exertional dyspnea, moderate, without chest pain, palpitations, wheezing, cough, or risk factors for PE. No PND, orthopnea, or edema.     ROS:  C: NEGATIVE for fever, chills, change in weight  ENT/MOUTH: see above; NEGATIVE for sore throat, rhinorrhea, congestion, facial pain   R: NEGATIVE for significant cough or SOB  CV: as above     I have reviewed the patient's medical history in detail and updated the computerized patient record.     OBJECTIVE:     /80  Pulse 80  Temp 96.7  F (35.9  C) (Oral)  Resp 16  Ht 5' 3\" (1.6 m)  Wt 142 lb (64.4 kg)  SpO2 98%  Breastfeeding? No  BMI 25.15 kg/m2  Body mass index is 25.15 kg/(m^2).  GENERAL: healthy, alert and no distress  EYES: Eyes grossly normal to inspection, PERRL and conjunctivae and sclerae normal  HENT: normal cephalic/atraumatic, right ear: canal is swollen, obscuring tympanic membrane, left ear: normal: no effusions, no erythema, normal landmarks, nose and mouth without ulcers or lesions, oropharynx clear and oral mucous membranes moist  NECK: no adenopathy, no asymmetry, masses, or scars and thyroid normal to palpation  RESP: lungs clear to auscultation - no rales, rhonchi or wheezes  CV: regular rate and rhythm, normal S1 S2, no S3 or S4, no murmur, click or rub, no peripheral edema    MS: extremities normal- no gross deformities noted  PSYCH: mentation appears normal, affect normal/bright    Diagnostic Test " Results:  Results for orders placed or performed in visit on 08/23/17   Spirometry, Breathing Capacity   Result Value Ref Range    FEV-1      FVC      FEV1/FVC      FEF 25/75         ASSESSMENT/PLAN:   (H60.501) Acute otitis externa of right ear, unspecified type  (primary encounter diagnosis)  Comment: recent cerumen irrigation in urgent care   Plan: neomycin-polymyxin-hydrocortisone (CORTISPORIN)        3.5-32397-3 otic suspension        Discussed risks and benefits of this medication. Call or return to clinic as needed if these symptoms worsen or fail to improve as anticipated for ENT referral.     (R06.09) Exertional dyspnea  Comment: normal chest x-ray, CBC and stress echocardiogram   Plan: Spirometry, Breathing Capacity, PULMONARY         MEDICINE REFERRAL         Offered chest CT     See Patient Instructions    Jocelin Guajardo MD  [facility]

## 2017-08-23 NOTE — PATIENT INSTRUCTIONS
[facility]    If you have any questions regarding to your visit please contact your care team:       Team Red:   Clinic Hours Telephone Number   Dr. Jocelin Hodge, NP   7am-7pm  Monday - Thursday   7am-5pm  Fridays  [phone]  (Appointment scheduling available 24/7)    Questions about your visit?   Team Line  [phone]   [facility] [place] [facility]pm Monday-Friday Saturday-Sunday- 9am-5pm   [place] - 5pm-9pm Monday-Friday Saturday-Sunday- 9am-5pm  [phone] - [place]   [phone] - [place]       What options do I have for visits at the clinic other than the traditional office visit?  To expand how we care for you, many of our providers are utilizing electronic visits (e-visits) and telephone visits, when medically appropriate, for interactions with their patients rather than a visit in the clinic.   We also offer nurse visits for many medical concerns. Just like any other service, we will bill your insurance company for this type of visit based on time spent on the phone with your provider. Not all insurance companies cover these visits. Please check with your medical insurance if this type of visit is covered. You will be responsible for any charges that are not paid by your insurance.      E-visits via [employer]:  generally incur a $35.00 fee.  Telephone visits:  Time spent on the phone: *charged based on time that is spent on the phone in increments of 10 minutes. Estimated cost:   5-10 mins $30.00   11-20 mins. $59.00   21-30 mins. $85.00     Use [employer] (secure email communication and access to your chart) to send your primary care provider a message or make an appointment. Ask someone on your Team how to sign up for [employer].  For a Price Quote for your services, please call our Consumer Price Line at [phone].      As always, Thank you for trusting us with your health care needs!  Discharged  by NINOSKA Dickson

## 2017-08-23 NOTE — MR AVS SNAPSHOT
After Visit Summary   8/23/2017    Brenna Watkins    MRN: [id number]           Patient Information     Date Of Birth          1935        Visit Information        Provider Department      8/23/2017 2:20 PM Jocelin Guajardo MD [facility]        Today's Diagnoses     Acute otitis externa of right ear, unspecified type    -  1    Exertional dyspnea          Care Instructions    [facility]    If you have any questions regarding to your visit please contact your care team:       Team Red:   Clinic Hours Telephone Number   Dr. Jocelin Hodge NP   7am-7pm  Monday - Thursday   7am-5pm  Fridays  [phone]  (Appointment scheduling available 24/7)    Questions about your visit?   Team Line  [phone]   [facility] [place] [facility] 11am-9pm Monday-Friday Saturday-Sunday- 9am-5pm   [place] - 5pm-9pm Monday-Friday Saturday-Sunday- 9am-5pm  [phone] - [place]   [phone] - [place]       What options do I have for visits at the clinic other than the traditional office visit?  To expand how we care for you, many of our providers are utilizing electronic visits (e-visits) and telephone visits, when medically appropriate, for interactions with their patients rather than a visit in the clinic.   We also offer nurse visits for many medical concerns. Just like any other service, we will bill your insurance company for this type of visit based on time spent on the phone with your provider. Not all insurance companies cover these visits. Please check with your medical insurance if this type of visit is covered. You will be responsible for any charges that are not paid by your insurance.      E-visits via [employer]:  generally incur a $35.00 fee.  Telephone visits:  Time spent on the phone: *charged based on time that is spent on the phone in increments of 10 minutes. Estimated cost:   5-10 mins $30.00    11-20 mins. $59.00   21-30 mins. $85.00     Use [employer] (secure email communication and access to your chart) to send your primary care provider a message or make an appointment. Ask someone on your Team how to sign up for [employer].  For a Price Quote for your services, please call our Consumer Price Line at [phone].      As always, Thank you for trusting us with your health care needs!  Discharged by NINOSKA Dickson            Follow-ups after your visit        Additional Services     PULMONARY MEDICINE REFERRAL       Your provider has referred you to: [facility]: [facility] [phone]   [email or web address]    Please be aware that coverage of these services is subject to the terms and limitations of your health insurance plan.  Call member services at your health plan with any benefit or coverage questions.      Please bring the following with you to your appointment:    (1) Any X-Rays, CTs or MRIs which have been performed.  Contact the facility where they were done to arrange for  prior to your scheduled appointment.    (2) List of current medications   (3) This referral request   (4) Any documents/labs given to you for this referral                  Follow-up notes from your care team     Return if symptoms worsen or fail to improve.      Who to contact     If you have questions or need follow up information about today's clinic visit or your schedule please contact [facility] directly at [phone].  Normal or non-critical lab and imaging results will be communicated to you by [employer], letter or phone within 4 business days after the clinic has received the results. If you do not hear from us within 7 days, please contact the clinic through [employer] or phone. If you have a critical or abnormal lab result, we will notify you by phone as soon as possible.  Submit refill requests through [employer]! or call your pharmacy and they will forward the refill request to us. Please  "allow 3 business days for your refill to be completed.          Additional Information About Your Visit        MyChart Information     [employer] lets you send messages to your doctor, view your test results, renew your prescriptions, schedule appointments and more. To sign up, go to [email or web address] . Click on \"Log in\" on the left side of the screen, which will take you to the Welcome page. Then click on \"Sign up Now\" on the right side of the page.     You will be asked to enter the access code listed below, as well as some personal information. Please follow the directions to create your username and password.     Your access code is: [email or web address]  Expires: 2017  3:36 PM     Your access code will  in 90 days. If you need help or a new code, please call your [facility] clinic or [phone].        Care EveryWhere ID     This is your Care EveryWhere ID. This could be used by other organizations to access your [facility] medical records  [id number]        Your Vitals Were     Pulse Temperature Respirations Height Pulse Oximetry Breastfeeding?    80 96.7  F (35.9  C) (Oral) 16 5' 3\" (1.6 m) 98% No    BMI (Body Mass Index)                   25.15 kg/m2            Blood Pressure from Last 3 Encounters:   17 128/80   07/10/17 128/84   17 134/82    Weight from Last 3 Encounters:   17 142 lb (64.4 kg)   07/10/17 142 lb (64.4 kg)   17 144 lb (65.3 kg)              We Performed the Following     PULMONARY MEDICINE REFERRAL     Spirometry, Breathing Capacity          Today's Medication Changes          These changes are accurate as of: 17  3:23 PM.  If you have any questions, ask your nurse or doctor.               Start taking these medicines.        Dose/Directions    neomycin-polymyxin-hydrocortisone 3.5-15906-4 otic suspension   Commonly known as:  CORTISPORIN   Used for:  Acute otitis externa of right ear, unspecified type   Started by:  Jocelin Guajardo MD        Dose:  4 drop "   Place 4 drops into the right ear 4 times daily   Quantity:  10 mL   Refills:  0            Where to get your medicines      These medications were sent to [facility] [address] [address]  AT [place] [address]  [address] [facility] [address]     Phone:  [phone]     neomycin-polymyxin-hydrocortisone 3.5-30711-0 otic suspension                Primary Care Provider Office Phone # Fax #    Jocelin Guajardo [phone] [address] [facility] [address]        Equal Access to Services     [facility]: Hadii aad ku hadasho Soomaali, waaxda luqadaha, qaybta kaalmada adeegyada, waxmicha pollock hayaan gumaro bridges . So [facility] [phone].    ATENCIÓN: Si habla español, tiene a james disposición servicios gratuitos de asistencia lingüística. [facility] [phone].    We comply with applicable federal civil rights laws and [place] laws. We do not discriminate on the basis of race, color, national origin, age, disability sex, sexual orientation or gender identity.            Thank you!     Thank you for choosing [facility]  for your care. Our goal is always to provide you with excellent care. Hearing back from our patients is one way we can continue to improve our services. Please take a few minutes to complete the written survey that you may receive in the mail after your visit with us. Thank you!             Your Updated Medication List - Protect others around you: Learn how to safely use, store and throw away your medicines at www.disposemymeds.org.          This list is accurate as of: 8/23/17  3:23 PM.  Always use your most recent med list.                   Brand Name Dispense Instructions for use Diagnosis    neomycin-polymyxin-hydrocortisone 3.5-84381-5 otic suspension    CORTISPORIN    10 mL    Place 4 drops into the right ear 4 times daily    Acute otitis externa of right ear, unspecified type

## 2017-08-23 NOTE — NURSING NOTE
"Chief Complaint   Patient presents with     Otalgia       Initial /80  Pulse 80  Temp 96.7  F (35.9  C) (Oral)  Resp 16  Ht 5' 3\" (1.6 m)  Wt 142 lb (64.4 kg)  SpO2 98%  Breastfeeding? No  BMI 25.15 kg/m2 Estimated body mass index is 25.15 kg/(m^2) as calculated from the following:    Height as of this encounter: 5' 3\" (1.6 m).    Weight as of this encounter: 142 lb (64.4 kg).  Medication Reconciliation: complete   Gracie Alfaro CMA (AAMA)      "

## 2017-08-23 NOTE — LETTER
[address] [place] [address]    August 24, 2017    Brenna Watkins  [address]  [place] [address]          Dear Brenna,    Your results are normal    Enclosed is a copy of your results.     Results for orders placed or performed in visit on 08/23/17   Spirometry, Breathing Capacity   Result Value Ref Range    FEV-1      FVC      FEV1/FVC      FEF 25/75         If you have any questions or concerns, please call myself or my nurse at [phone].      Sincerely,        Jocelin Guajardo MD/HA

## 2017-08-28 ENCOUNTER — TELEPHONE (OUTPATIENT)
Dept: FAMILY MEDICINE | Facility: CLINIC | Age: 82
End: 2017-08-28

## 2017-08-28 DIAGNOSIS — H60.501 ACUTE OTITIS EXTERNA OF RIGHT EAR, UNSPECIFIED TYPE: Primary | ICD-10-CM

## 2017-08-28 NOTE — TELEPHONE ENCOUNTER
Reason for Call:  Other call back    Detailed comments: patient states she was seen last week for an ear infection and was prescribed drops. Patient has been taking the drops since Thursday and says she has not had any relief. Patient would like a call back to discuss what the next steps are.    Phone Number Patient can be reached at: Home number on file [phone] (home)    Best Time: any time    Can we leave a detailed message on this number? YES    Call taken on 8/28/2017 at 5:18 PM by Jillian Curiel

## 2017-08-29 NOTE — TELEPHONE ENCOUNTER
Your provider has referred you to: [facility]: [facility] [place] [phone]   [email or web address]

## 2017-08-29 NOTE — TELEPHONE ENCOUNTER
Called and gave Brenna the information below for the referral and transferred her to the . Justa Russell,

## 2017-08-30 ENCOUNTER — OFFICE VISIT (OUTPATIENT)
Dept: OTOLARYNGOLOGY | Facility: CLINIC | Age: 82
End: 2017-08-30
Payer: COMMERCIAL

## 2017-08-30 VITALS — WEIGHT: 141 LBS | HEIGHT: 63 IN | BODY MASS INDEX: 24.98 KG/M2 | RESPIRATION RATE: 16 BRPM | TEMPERATURE: 98.6 F

## 2017-08-30 DIAGNOSIS — H60.311 ACUTE DIFFUSE OTITIS EXTERNA OF RIGHT EAR: Primary | ICD-10-CM

## 2017-08-30 DIAGNOSIS — H61.22 IMPACTED CERUMEN OF LEFT EAR: ICD-10-CM

## 2017-08-30 PROCEDURE — 99203 OFFICE O/P NEW LOW 30 MIN: CPT | Mod: 25 | Performed by: OTOLARYNGOLOGY

## 2017-08-30 PROCEDURE — 87186 SC STD MICRODIL/AGAR DIL: CPT | Performed by: OTOLARYNGOLOGY

## 2017-08-30 PROCEDURE — 87070 CULTURE OTHR SPECIMN AEROBIC: CPT | Performed by: OTOLARYNGOLOGY

## 2017-08-30 PROCEDURE — 87205 SMEAR GRAM STAIN: CPT | Performed by: OTOLARYNGOLOGY

## 2017-08-30 PROCEDURE — 87077 CULTURE AEROBIC IDENTIFY: CPT | Performed by: OTOLARYNGOLOGY

## 2017-08-30 PROCEDURE — 69210 REMOVE IMPACTED EAR WAX UNI: CPT | Performed by: OTOLARYNGOLOGY

## 2017-08-30 PROCEDURE — 87102 FUNGUS ISOLATION CULTURE: CPT | Performed by: OTOLARYNGOLOGY

## 2017-08-30 RX ORDER — OFLOXACIN 3 MG/ML
5 SOLUTION AURICULAR (OTIC) 2 TIMES DAILY
Qty: 10 ML | Refills: 0 | Status: SHIPPED | OUTPATIENT
Start: 2017-08-30 | End: 2017-08-30

## 2017-08-30 RX ORDER — OFLOXACIN 3 MG/ML
5 SOLUTION AURICULAR (OTIC) 2 TIMES DAILY
Qty: 10 ML | Refills: 0 | Status: SHIPPED | OUTPATIENT
Start: 2017-08-30 | End: 2017-09-06

## 2017-08-30 NOTE — PATIENT INSTRUCTIONS
General Scheduling Information  To schedule your CT/MRI scan, please contact [place] at [phone]   [address] [place] [address]    To schedule your Surgery, please contact our Specialty Schedulers at [phone]    ENT Clinic Locations Clinic Hours Telephone Number     Angela Constantino  [address] [place] [address]   Tuesday:       8:00am -- 4:00pm    Wednesday:  8:00am - 4:00pm   To schedule an appointment with   Dr. Soni,   please contact our   Specialty Scheduling Department at:     [phone]       Angela Larson  [address] [place] [address]   Friday:          8:00am - 4:00pm         Urgent Care Locations Clinic Hours Telephone Numbers     Angela Lara  [address] [place] [address]     Monday-Friday:     11:00pm - 9:00pm    Saturday-Sunday:  9:00am - 5:00pm   [phone]     Angela Larson  [address] [place] [address]     Monday-Friday:      5:00pm - 9:00pm     Saturday-Sunday:  9:00am - 5:00pm   [phone]

## 2017-08-30 NOTE — NURSING NOTE
"Chief Complaint   Patient presents with     Ear Problem     Right ear pain       Initial Temp 98.6  F (37  C) (Oral)  Resp 16  Ht 1.6 m (5' 3\")  Wt 64 kg (141 lb)  BMI 24.98 kg/m2 Estimated body mass index is 24.98 kg/(m^2) as calculated from the following:    Height as of this encounter: 1.6 m (5' 3\").    Weight as of this encounter: 64 kg (141 lb).  Medication Reconciliation: complete   Chin Dsouza, NINOSKA        "

## 2017-08-30 NOTE — PROGRESS NOTES
Chief Complaint - right ear hearing loss and pain    History of Present Illness - Brenna Watkins is a 82 year old female who presents to me today with hearing loss and pain in the right ear.  It has been present and noticeable for approximately 3 weeks. The patient has noted some otorrhea. No troubles in the left ear, or no pain, but feels it closes easy. Has small ear canals. The patient has tried cortisporin right ear. The patient notes no water exposure or ear canal trauma. Nonsmoker.     Past Medical History -   Patient Active Problem List   Diagnosis     CARDIOVASCULAR SCREENING; LDL GOAL LESS THAN 160     Advanced directives, counseling/discussion     CTS (carpal tunnel syndrome)     Elevated blood pressure reading without diagnosis of hypertension     Exertional dyspnea     Osteopenia       Current Medications -   Current Outpatient Prescriptions:      neomycin-polymyxin-hydrocortisone (CORTISPORIN) 3.5-29484-3 otic suspension, Place 4 drops into the right ear 4 times daily, Disp: 10 mL, Rfl: 0    Allergies -   Allergies   Allergen Reactions     Penicillins        Social History -   Social History     Social History     Marital status:      Spouse name: Rishabh      Number of children: 4     Years of education: N/A     Occupational History      Retired     Social History Main Topics     Smoking status: Never Smoker     Smokeless tobacco: Never Used     Alcohol use 0.6 - 1.2 oz/week     1 - 2 Glasses of wine per week     Drug use: No     Sexual activity: No     Other Topics Concern     Parent/Sibling W/ Cabg, Mi Or Angioplasty Before 65f 55m? No     Social History Narrative       Family History -   Family History   Problem Relation Age of Onset     Sudden Death Mother      d. childbirth      Alcoholism Father 62     Myocardial Infarction Brother 68     Alzheimer Disease Sister      DIABETES No family hx of      CANCER No family hx of        Review of Systems - As per HPI and PMHx, otherwise 7 system review  "of the head and neck negative.    Physical Exam  Temp 98.6  F (37  C) (Oral)  Resp 16  Ht 1.6 m (5' 3\")  Wt 64 kg (141 lb)  BMI 24.98 kg/m2  General - The patient is in no distress.  Alert and oriented to person and place, answers questions and cooperates with examination appropriately.   Voice and Breathing - The patient was breathing comfortably without the use of accessory muscles. There was no wheezing, stridor, or stertor.  The patients voice was clear and strong.  Ears - The right ear was examined. It showed erythema and edema of the canal. There was moist debri. I cultured this. Using the binocular microscope I suctioned and debrided the otorrhea and debri in the canal. This was very tender for the patient. I could then see the tympanic membrane. It appeared intact. No evidence of middle ear effusion. I placed an ear wick and inflated with cortisporin. The left ear was then examined. The canal was nonedematous and nonerythematous, but impacted with cerumen. I also removed this with a curette and otomicroscope. I removed all the wax. No evidence of infection. The tympanic membrane was intact and the middle ear was well aerated.   Eyes - Extraocular movements intact.  Sclera were not icteric or injected.  Neck - Palpation of the occipital, submental, submandibular, internal jugular chain, and supraclavicular nodes did not demonstrateany abnormal lymph nodes or masses. No parotid masses. Palpation of the thyroid was soft and smooth, with no nodules or goiter appreciated.  The trachea was mobile and midline.  Neurological - Cranial nerves 2 through 12 were grossly intact. House-Brackmann grade 1 out of 6 bilaterally.      Assessment and Plan - Brenna Watkins is a 82 year old female has a right ear otitis externa. I debrided the canal under the microscope and cultured this. I placed an ear wick. I will prescribe ofloxacin and have the patient return in 1 week for wick removal. I discussed keeping the ear dry, " and to not place anything in the ear except for the ear drops.     Jonathan Soni MD  Otolaryngology  [facility]

## 2017-08-30 NOTE — MR AVS SNAPSHOT
After Visit Summary   8/30/2017    Brenna Watkins    MRN: [id number]           Patient Information     Date Of Birth          1935        Visit Information        Provider Department      8/30/2017 2:15 PM Jonathan Soni MD [facility] [place]        Today's Diagnoses     Acute diffuse otitis externa of right ear    -  1    Impacted cerumen of left ear          Care Instructions    General Scheduling Information  To schedule your CT/MRI scan, please contact [facility] at [phone]   [address]  [place] [address]    To schedule your Surgery, please contact our Specialty Schedulers at [phone]    ENT Clinic Locations Clinic Hours Telephone Number     Angela [facility]ino  [address]  [place] [address]   Tuesday:       8:00am -- 4:00pm    Wednesday:  8:00am - 4:00pm   To schedule an appointment with   Dr. Soni,   please contact our   Specialty Scheduling Department at:     [phone]       Angela Larson  [address]   [place] [address]   Friday:          8:00am - 4:00pm         Urgent Care Locations Clinic Hours Telephone Numbers     Angela [place]  [address]   [place] [address]     Monday-Friday:     11:00pm - 9:00pm    Saturday-Sunday:  9:00am - 5:00pm   [phone]     Angela Larson  [address]   [place] [address]     Monday-Friday:      5:00pm - 9:00pm     Saturday-Sunday:  9:00am - 5:00pm   [phone]               Follow-ups after your visit        Your next 10 appointments already scheduled     Sep 05, 2017 10:30 AM CDT   Return Visit with Jonathan Soni MD   [facility]    [address] [place], [place] [address] [address] [phone]              Who to contact     If you have questions or need follow up information about today's clinic visit or your schedule please contact [facility] directly at [phone].  Normal or non-critical lab and  "imaging results will be communicated to you by MyChart, letter or phone within 4 business days after the clinic has received the results. If you do not hear from us within 7 days, please contact the clinic through Ascendx Spinet or phone. If you have a critical or abnormal lab result, we will notify you by phone as soon as possible.  Submit refill requests through TrueLens or call your pharmacy and they will forward the refill request to us. Please allow 3 business days for your refill to be completed.          Additional Information About Your Visit        TackkharEarnix Information     TrueLens lets you send messages to your doctor, view your test results, renew your prescriptions, schedule appointments and more. To sign up, go to [email or web address] . Click on \"Log in\" on the left side of the screen, which will take you to the Welcome page. Then click on \"Sign up Now\" on the right side of the page.     You will be asked to enter the access code listed below, as well as some personal information. Please follow the directions to create your username and password.     Your access code is: [email or web address]  Expires: 2017  3:36 PM     Your access code will  in 90 days. If you need help or a new code, please call your [facility] clinic or [phone].        Care EveryWhere ID     This is your Care EveryWhere ID. This could be used by other organizations to access your [facility] medical records  [id number]        Your Vitals Were     Temperature Respirations Height BMI (Body Mass Index)          98.6  F (37  C) (Oral) 16 1.6 m (5' 3\") 24.98 kg/m2         Blood Pressure from Last 3 Encounters:   17 128/80   07/10/17 128/84   17 134/82    Weight from Last 3 Encounters:   17 64 kg (141 lb)   17 64.4 kg (142 lb)   07/10/17 64.4 kg (142 lb)              We Performed the Following     Ear Culture Aerobic Bacterial     Fungus Culture, non-blood     Gram stain     Remove Cerumen          Today's Medication " Changes          These changes are accurate as of: 8/30/17  4:41 PM.  If you have any questions, ask your nurse or doctor.               Start taking these medicines.        Dose/Directions    ofloxacin 0.3 % otic solution   Commonly known as:  FLOXIN   Used for:  Acute diffuse otitis externa of right ear   Started by:  Jonathan Soni MD        Dose:  5 drop   Place 5 drops into the right ear 2 times daily for 7 days   Quantity:  10 mL   Refills:  0            Where to get your medicines      These medications were sent to [facility] [address] [address]  AT [place] [address]  [address] [facility] [place] [facility] [address]     Phone:  [phone]     ofloxacin 0.3 % otic solution                Primary Care Provider Office Phone # Fax #    Jocelin Guajardo [phone] [address] [facility] [address]        Equal Access to Services     [facility] AH: Hadii aad ku hadasho Soomaali, waaxda luqadaha, qaybta kaalmada adeegyada, waxay idiin hayrickn gumaro bridges . So [facility] [phone].    ATENCIÓN: Si habla español, tiene a james disposición servicios gratuitos de asistencia lingüística. Llame al [phone].    We comply with applicable federal civil rights laws and [place] laws. We do not discriminate on the basis of race, color, national origin, age, disability sex, sexual orientation or gender identity.            Thank you!     Thank you for choosing [facility]  for your care. Our goal is always to provide you with excellent care. Hearing back from our patients is one way we can continue to improve our services. Please take a few minutes to complete the written survey that you may receive in the mail after your visit with us. Thank you!             Your Updated Medication List - Protect others around you: Learn how to safely use, store and throw away your medicines at www.disposemymeds.org.          This list is  accurate as of: 8/30/17  4:41 PM.  Always use your most recent med list.                   Brand Name Dispense Instructions for use Diagnosis    neomycin-polymyxin-hydrocortisone 3.5-79321-1 otic suspension    CORTISPORIN    10 mL    Place 4 drops into the right ear 4 times daily    Acute otitis externa of right ear, unspecified type       ofloxacin 0.3 % otic solution    FLOXIN    10 mL    Place 5 drops into the right ear 2 times daily for 7 days    Acute diffuse otitis externa of right ear

## 2017-08-31 LAB
GRAM STN SPEC: NORMAL
GRAM STN SPEC: NORMAL
SPECIMEN SOURCE: NORMAL

## 2017-09-02 LAB
BACTERIA SPEC CULT: ABNORMAL
SPECIMEN SOURCE: ABNORMAL

## 2017-09-05 ENCOUNTER — OFFICE VISIT (OUTPATIENT)
Dept: OTOLARYNGOLOGY | Facility: CLINIC | Age: 82
End: 2017-09-05
Payer: COMMERCIAL

## 2017-09-05 VITALS — DIASTOLIC BLOOD PRESSURE: 88 MMHG | HEART RATE: 79 BPM | OXYGEN SATURATION: 97 % | SYSTOLIC BLOOD PRESSURE: 138 MMHG

## 2017-09-05 DIAGNOSIS — H60.311 ACUTE DIFFUSE OTITIS EXTERNA OF RIGHT EAR: Primary | ICD-10-CM

## 2017-09-05 PROCEDURE — 99213 OFFICE O/P EST LOW 20 MIN: CPT | Performed by: OTOLARYNGOLOGY

## 2017-09-05 NOTE — PROGRESS NOTES
Chief Complaint - right ear otitis externa follow-up    History of Present Illness - Brenna Watkins is a 82 year old female who returns to me today to check on otitis externa, right. She has had pain and hearing loss. It has been present and noticeable for approximately 3-4 weeks. The patient had noted some otorrhea. No troubles in the left ear, or no pain, but feels it closes easy. Has small ear canals. The patient has tried cortisporin right ear, but this failed. I saw her last week. Culture grew staph. I placed an ear wick, and had her use ofloxacin. She returns and notes no more ear pain. Right ear is still plugged, but she has the wick still in place. No other new symptoms to report. Ofloxacin was very expensive for her.     Past Medical History -   Patient Active Problem List   Diagnosis     CARDIOVASCULAR SCREENING; LDL GOAL LESS THAN 160     Advanced directives, counseling/discussion     CTS (carpal tunnel syndrome)     Elevated blood pressure reading without diagnosis of hypertension     Exertional dyspnea     Osteopenia       Current Medications -   Current Outpatient Prescriptions:      ofloxacin (FLOXIN) 0.3 % otic solution, Place 5 drops into the right ear 2 times daily for 7 days, Disp: 10 mL, Rfl: 0     neomycin-polymyxin-hydrocortisone (CORTISPORIN) 3.5-29817-8 otic suspension, Place 4 drops into the right ear 4 times daily, Disp: 10 mL, Rfl: 0    Allergies -   Allergies   Allergen Reactions     Penicillins        Social History -   Social History     Social History     Marital status:      Spouse name: Rishabh      Number of children: 4     Years of education: N/A     Occupational History      Retired     Social History Main Topics     Smoking status: Never Smoker     Smokeless tobacco: Never Used     Alcohol use 0.6 - 1.2 oz/week     1 - 2 Glasses of wine per week     Drug use: No     Sexual activity: No     Other Topics Concern     Parent/Sibling W/ Cabg, Mi Or Angioplasty Before 65f 55m? No      Social History Narrative       Family History -   Family History   Problem Relation Age of Onset     Sudden Death Mother      d. childbirth      Alcoholism Father 62     Myocardial Infarction Brother 68     Alzheimer Disease Sister      DIABETES No family hx of      CANCER No family hx of        Review of Systems - As per HPI and PMHx, otherwise 7 system review of the head and neck negative.    Physical Exam  /88 (BP Location: Right arm, Patient Position: Chair, Cuff Size: Adult Regular)  Pulse 79  SpO2 97%  Breastfeeding? No  General - The patient is in no distress.  Alert and oriented to person and place, answers questions and cooperates with examination appropriately.   Voice and Breathing - The patient was breathing comfortably without the use of accessory muscles. There was no wheezing, stridor, or stertor.  The patients voice was clear and strong.  Ears - The right ear was examined. I removed the ear wick which was mostly clean. The right ear canal again was small, but not swollen like before. She had some white drop debri. I could see the tympanic membrane. It is intact. No pus or significant debri. The left ear was then examined. The canal was nonedematous and nonerythematous. No evidence of infection. The tympanic membrane was intact and the middle ear was well aerated.   Eyes - Extraocular movements intact.  Sclera were not icteric or injected.  Neck - Palpation of the occipital, submental, submandibular, internal jugular chain, and supraclavicular nodes did not demonstrateany abnormal lymph nodes or masses. No parotid masses. Palpation of the thyroid was soft and smooth, with no nodules or goiter appreciated.  The trachea was mobile and midline.  Neurological - Cranial nerves 2 through 12 were grossly intact. House-Brackmann grade 1 out of 6 bilaterally.      Assessment and Plan - Brennaradha Watkins is a 82 year old female who had a right ear otitis externa. Finish ofloxacin, 3 more days in right  ear. Keep ear dry. If symptoms don't fully resolve return to clinic.       Jonathan Soni MD  Otolaryngology  [facility]

## 2017-09-05 NOTE — MR AVS SNAPSHOT
"              After Visit Summary   2017    Brenna Watkins    MRN: [id number]           Patient Information     Date Of Birth          1935        Visit Information        Provider Department      2017 10:30 AM Jonathan Soni MD [facility]        Today's Diagnoses     Acute diffuse otitis externa of right ear    -  1       Follow-ups after your visit        Who to contact     If you have questions or need follow up information about today's clinic visit or your schedule please contact [facility] directly at [phone].  Normal or non-critical lab and imaging results will be communicated to you by Amadesahart, letter or phone within 4 business days after the clinic has received the results. If you do not hear from us within 7 days, please contact the clinic through Amadesahart or phone. If you have a critical or abnormal lab result, we will notify you by phone as soon as possible.  Submit refill requests through Ntirety or call your pharmacy and they will forward the refill request to us. Please allow 3 business days for your refill to be completed.          Additional Information About Your Visit        MyChart Information     Ntirety lets you send messages to your doctor, view your test results, renew your prescriptions, schedule appointments and more. To sign up, go to [email or web address] . Click on \"Log in\" on the left side of the screen, which will take you to the Welcome page. Then click on \"Sign up Now\" on the right side of the page.     You will be asked to enter the access code listed below, as well as some personal information. Please follow the directions to create your username and password.     Your access code is: [email or web address]  Expires: 2017  3:36 PM     Your access code will  in 90 days. If you need help or a new code, please call your [facility] or [phone].        Care EveryWhere ID     This is your Care EveryWhere ID. This could be used " by other organizations to access your [facility] medical records  [id number]        Your Vitals Were     Pulse Pulse Oximetry Breastfeeding?             79 97% No          Blood Pressure from Last 3 Encounters:   09/05/17 138/88   08/23/17 128/80   07/10/17 128/84    Weight from Last 3 Encounters:   08/30/17 64 kg (141 lb)   08/23/17 64.4 kg (142 lb)   07/10/17 64.4 kg (142 lb)              Today, you had the following     No orders found for display       Primary Care Provider Office Phone # Fax #    Jocelin Guajardo -[phone]       [address] [facility] [address]        Equal Access to Services     CHRISTIANA GARCIAS : Hadii elke jones hadasho Sodorothea, waaxda luqadaha, qaybta kaalmada adeegyada, armani robles. So [facility] [phone].    ATENCIÓN: Si habla español, tiene a james disposición servicios gratuitos de asistencia lingüística. Llame al [phone].    We comply with applicable federal civil rights laws and [place] laws. We do not discriminate on the basis of race, color, national origin, age, disability sex, sexual orientation or gender identity.            Thank you!     Thank you for choosing [facility]  for your care. Our goal is always to provide you with excellent care. Hearing back from our patients is one way we can continue to improve our services. Please take a few minutes to complete the written survey that you may receive in the mail after your visit with us. Thank you!             Your Updated Medication List - Protect others around you: Learn how to safely use, store and throw away your medicines at www.disposemymeds.org.          This list is accurate as of: 9/5/17 11:37 AM.  Always use your most recent med list.                   Brand Name Dispense Instructions for use Diagnosis    neomycin-polymyxin-hydrocortisone 3.5-00912-6 otic suspension    CORTISPORIN    10 mL    Place 4 drops into the right ear 4 times daily    Acute otitis  externa of right ear, unspecified type       ofloxacin 0.3 % otic solution    FLOXIN    10 mL    Place 5 drops into the right ear 2 times daily for 7 days    Acute diffuse otitis externa of right ear

## 2017-09-05 NOTE — NURSING NOTE
"Chief Complaint   Patient presents with     RECHECK       Initial /88 (BP Location: Right arm, Patient Position: Chair, Cuff Size: Adult Regular)  Pulse 79  SpO2 97%  Breastfeeding? No Estimated body mass index is 24.98 kg/(m^2) as calculated from the following:    Height as of 8/30/17: 1.6 m (5' 3\").    Weight as of 8/30/17: 64 kg (141 lb).  Medication Reconciliation: complete     Tiffany Matos RN      "

## 2017-09-28 LAB
FUNGUS SPEC CULT: NORMAL
SPECIMEN SOURCE: NORMAL

## 2018-01-11 NOTE — NURSING NOTE
"Chief Complaint   Patient presents with     [facility] Care     Wellness Visit       Initial /82 (BP Location: Right arm, Patient Position: Chair, Cuff Size: Adult Regular)  Pulse 87  Temp 99.1  F (37.3  C) (Oral)  Ht 5' 3\" (1.6 m)  Wt 144 lb (65.3 kg)  SpO2 96%  BMI 25.51 kg/m2 Estimated body mass index is 25.51 kg/(m^2) as calculated from the following:    Height as of this encounter: 5' 3\" (1.6 m).    Weight as of this encounter: 144 lb (65.3 kg).  Medication Reconciliation: complete           " The patient has been examined and the H&P has been reviewed:    I concur with the findings and no changes have occurred since H&P was written. Ok to proceed with BM bx    Anesthesia/Surgery risks, benefits and alternative options discussed and understood by patient/family.          There are no hospital problems to display for this patient.

## 2018-03-15 NOTE — PROGRESS NOTES
"  SUBJECTIVE:   Brenna Watkins is a 83 year old female who presents to clinic today for the following health issues:  {Provider please address medication reconciliation discrepancies--rooming staff please delete if no med/rec issues}    {Superlists:787028}    {additional problems for provider to add:128120}    Problem list and histories reviewed & adjusted, as indicated.  Additional history: {NONE - AS DOCUMENTED:484485::\"as documented\"}    {HIST REVIEW/ LINKS 2:206603}    Reviewed and updated as needed this visit by clinical staff       Reviewed and updated as needed this visit by Provider         {PROVIDER CHARTING PREFERENCE:570168}    "

## 2018-03-15 NOTE — PATIENT INSTRUCTIONS
Call the imaging center at [phone] or  [phone] to schedule your chest CT.    [facility]    If you have any questions regarding to your visit please contact your care team:       Team Red:   Clinic Hours Telephone Number   Dr. Jocelin Hodge, NP   7am-7pm  Monday - Thursday   7am-5pm  Fridays  [phone]  (Appointment scheduling available 24/7)    Questions about your visit?   Team Line  [phone]   Urgent Care - [place] - 11am-9pm Monday-Friday Saturday-Sunday- 9am-5pm   [place] - 5pm-9pm Monday-Friday Saturday-Sunday- 9am-5pm  [phone] - [facility]  [phone] - [place]       What options do I have for visits at the clinic other than the traditional office visit?  To expand how we care for you, many of our providers are utilizing electronic visits (e-visits) and telephone visits, when medically appropriate, for interactions with their patients rather than a visit in the clinic.   We also offer nurse visits for many medical concerns. Just like any other service, we will bill your insurance company for this type of visit based on time spent on the phone with your provider. Not all insurance companies cover these visits. Please check with your medical insurance if this type of visit is covered. You will be responsible for any charges that are not paid by your insurance.      E-visits via Swagsy:  generally incur a $35.00 fee.  Telephone visits:  Time spent on the phone: *charged based on time that is spent on the phone in increments of 10 minutes. Estimated cost:   5-10 mins $30.00   11-20 mins. $59.00   21-30 mins. $85.00     Use ArtSetterst (secure email communication and access to your chart) to send your primary care provider a message or make an appointment. Ask someone on your Team how to sign up for Swagsy.  For a Price Quote for your services, please call our Consumer Price Line at  [phone].      As always, Thank you for trusting us with your health care needs!  Mary Lou TATE MA      Before Your Surgery      Call your surgeon if there is any change in your health. This includes signs of a cold or flu (such as a sore throat, runny nose, cough, rash or fever).    Do not smoke, drink alcohol or take over the counter medicine (unless your surgeon or primary care doctor tells you to) for the 24 hours before and after surgery.    If you take prescribed drugs: Follow your doctor s orders about which medicines to take and which to stop until after surgery.    Eating and drinking prior to surgery: follow the instructions from your surgeon    Take a shower or bath the night before surgery. Use the soap your surgeon gave you to gently clean your skin. If you do not have soap from your surgeon, use your regular soap. Do not shave or scrub the surgery site.  Wear clean pajamas and have clean sheets on your bed.

## 2018-03-21 ENCOUNTER — OFFICE VISIT (OUTPATIENT)
Dept: FAMILY MEDICINE | Facility: CLINIC | Age: 83
End: 2018-03-21
Payer: COMMERCIAL

## 2018-03-21 VITALS
TEMPERATURE: 96.5 F | WEIGHT: 147 LBS | HEIGHT: 62 IN | HEART RATE: 85 BPM | SYSTOLIC BLOOD PRESSURE: 120 MMHG | RESPIRATION RATE: 18 BRPM | BODY MASS INDEX: 27.05 KG/M2 | OXYGEN SATURATION: 97 % | DIASTOLIC BLOOD PRESSURE: 76 MMHG

## 2018-03-21 DIAGNOSIS — Z01.818 PREOP GENERAL PHYSICAL EXAM: Primary | ICD-10-CM

## 2018-03-21 DIAGNOSIS — M20.40 HAMMER TOE, UNSPECIFIED LATERALITY: ICD-10-CM

## 2018-03-21 DIAGNOSIS — R06.09 EXERTIONAL DYSPNEA: ICD-10-CM

## 2018-03-21 DIAGNOSIS — K21.9 GERD WITHOUT ESOPHAGITIS: ICD-10-CM

## 2018-03-21 LAB
ANION GAP SERPL CALCULATED.3IONS-SCNC: 8 MMOL/L (ref 3–14)
BUN SERPL-MCNC: 13 MG/DL (ref 7–30)
CALCIUM SERPL-MCNC: 9.5 MG/DL (ref 8.5–10.1)
CHLORIDE SERPL-SCNC: 105 MMOL/L (ref 94–109)
CO2 SERPL-SCNC: 26 MMOL/L (ref 20–32)
CREAT SERPL-MCNC: 0.8 MG/DL (ref 0.52–1.04)
GFR SERPL CREATININE-BSD FRML MDRD: 69 ML/MIN/1.7M2
GLUCOSE SERPL-MCNC: 83 MG/DL (ref 70–99)
HGB BLD-MCNC: 13.9 G/DL (ref 11.7–15.7)
POTASSIUM SERPL-SCNC: 4.2 MMOL/L (ref 3.4–5.3)
SODIUM SERPL-SCNC: 139 MMOL/L (ref 133–144)

## 2018-03-21 PROCEDURE — 99214 OFFICE O/P EST MOD 30 MIN: CPT | Performed by: FAMILY MEDICINE

## 2018-03-21 PROCEDURE — 85018 HEMOGLOBIN: CPT | Performed by: FAMILY MEDICINE

## 2018-03-21 PROCEDURE — 80048 BASIC METABOLIC PNL TOTAL CA: CPT | Performed by: FAMILY MEDICINE

## 2018-03-21 PROCEDURE — 36415 COLL VENOUS BLD VENIPUNCTURE: CPT | Performed by: FAMILY MEDICINE

## 2018-03-21 NOTE — NURSING NOTE
"Chief Complaint   Patient presents with     Pre-Op Exam       Initial /76  Pulse 85  Temp 96.5  F (35.8  C) (Oral)  Resp 18  Ht 5' 2\" (1.575 m)  Wt 147 lb (66.7 kg)  SpO2 97%  Breastfeeding? No  BMI 26.89 kg/m2 Estimated body mass index is 26.89 kg/(m^2) as calculated from the following:    Height as of this encounter: 5' 2\" (1.575 m).    Weight as of this encounter: 147 lb (66.7 kg).  Medication Reconciliation: complete   Mary Lou TATE MA      "

## 2018-03-21 NOTE — PROGRESS NOTES
[facility]  [address] [place] [address]  [place] [address]  [phone]  Dept: [phone]    PRE-OP EVALUATION:  Today's date: 3/21/2018    Brenna Watkins (: 1935) presents for pre-operative evaluation assessment as requested by Dr. Murillo.  She requires evaluation and anesthesia risk assessment prior to undergoing surgery/procedure for treatment of Left great toe.    Proposed Surgery/ Procedure:   Date of Surgery/ Procedure:   Time of Surgery/ Procedure:   Hospital/Surgical Facility:   Fax number for surgical facility:    Primary Physician: Jocelin Guajardo  Type of Anesthesia Anticipated: General    Patient has a Health Care Directive or Living Will:  NO    1. NO - Do you have a history of heart attack, stroke, stent, bypass or surgery on an artery in the head, neck, heart or legs?  2. NO - Do you ever have any pain or discomfort in your chest?  3. NO - Do you have a history of  Heart Failure?  4. YES - ARE YOUR TROUBLED BY SHORTNESS OF BREATH WHEN WALKING ON THE LEVEL, UP A SLIGHT HILL OR AT NIGHT?    5. NO - Do you currently have a cold, bronchitis or other respiratory infection?  6. NO - Do you have a cough, shortness of breath or wheezing?  7. NO - Do you sometimes get pains in the calves of your legs when you walk?  8. NO - Do you or anyone in your family have previous history of blood clots?  9. NO - Do you or does anyone in your family have a serious bleeding problem such as prolonged bleeding following surgeries or cuts?  10. NO - Have you ever had problems with anemia or been told to take iron pills?  11. NO - Have you had any abnormal blood loss such as black, tarry or bloody stools, or abnormal vaginal bleeding?  12. NO - Have you ever had a blood transfusion?  13. NO - Have you or any of your relatives ever had problems with anesthesia?  14. NO - Do you have sleep apnea, excessive snoring or daytime drowsiness?  15. NO - Do you have any prosthetic heart valves?  16. NO - Do you  have prosthetic joints?  17. NO - Is there any chance that you may be pregnant?    HPI:   Brenna Watkins is a 83 year old female who presents with toe pain. Symptom onset has been gradual, worsening for a time period of 12 months. Severity is described as moderate. Course of her symptoms over time is worsening.    See problem list for active medical problems.  Problems all longstanding and stable, except as noted/documented.  See ROS for pertinent symptoms related to these conditions.                                                                                                  .    MEDICAL HISTORY:     Patient Active Problem List    Diagnosis Date Noted     GERD without esophagitis      Priority: Medium     Elevated blood pressure reading without diagnosis of hypertension 07/05/2017     Priority: Medium     Exertional dyspnea 07/05/2017     Priority: Medium     CTS (carpal tunnel syndrome)      Priority: Medium     Osteopenia      Priority: Low     CARDIOVASCULAR SCREENING; LDL GOAL LESS THAN 160 06/13/2017     Priority: Low     Advanced directives, counseling/discussion 06/13/2017     Priority: Low      Past Medical History:   Diagnosis Date     Brain aneurysm      Bunion      CTS (carpal tunnel syndrome)      GERD without esophagitis      Osteopenia      Past Surgical History:   Procedure Laterality Date     APPENDECTOMY       BACK SURGERY       CARPAL TUNNEL RELEASE RT/LT Right      CRANIOTOMY, REPAIR ANEURYSM, COMBINED       STRIP VEIN       Current Outpatient Prescriptions   Medication Sig Dispense Refill     Omeprazole (PRILOSEC PO) Take by mouth daily       OTC products: None, except as noted above    Allergies   Allergen Reactions     Penicillins       Latex Allergy: NO    Social History   Substance Use Topics     Smoking status: Never Smoker     Smokeless tobacco: Never Used     Alcohol use 0.6 - 1.2 oz/week     1 - 2 Glasses of wine per week     History   Drug Use No     Social History     Social  "History     Marital status:      Spouse name: Rishabh      Number of children: 4     Years of education: 12     Occupational History     , [employer]  Retired     Social History Main Topics     Smoking status: Never Smoker     Smokeless tobacco: Never Used     Alcohol use 0.6 - 1.2 oz/week     1 - 2 Glasses of wine per week     Drug use: No     Sexual activity: Not Currently     Partners: Male     Birth control/ protection: Post-menopausal     Other Topics Concern     Parent/Sibling W/ Cabg, Mi Or Angioplasty Before 65f 55m? No     Social History Narrative     Family History   Problem Relation Age of Onset     Sudden Death Mother      d. childbirth      Alcoholism Father 62     Myocardial Infarction Brother 68     Alzheimer Disease Sister      DIABETES No family hx of      CANCER No family hx of       REVIEW OF SYSTEMS:   CONSTITUTIONAL: NEGATIVE for fever, chills, change in weight  INTEGUMENTARY/SKIN: NEGATIVE for worrisome rashes, moles or lesions  EYES: NEGATIVE for vision changes or irritation  ENT/MOUTH: NEGATIVE for ear, mouth and throat problems  RESP:dyspnea on exertion  CV: NEGATIVE for chest pain, palpitations or peripheral edema  GI: GERD and increased abdominal girth; NEGATIVE for nausea, abdominal pain, or change in bowel habits  : NEGATIVE for frequency, dysuria, or hematuria  MUSCULOSKELETAL: see HPI   NEURO: NEGATIVE for weakness, dizziness or paresthesias  ENDOCRINE: NEGATIVE for temperature intolerance, skin/hair changes  HEME: NEGATIVE for bleeding problems  PSYCHIATRIC: NEGATIVE for changes in mood or affect  Complete ROS otherwise negative.     EXAM:   /76  Pulse 85  Temp 96.5  F (35.8  C) (Oral)  Resp 18  Ht 5' 2\" (1.575 m)  Wt 147 lb (66.7 kg)  SpO2 97%  Breastfeeding? No  BMI 26.89 kg/m2    GENERAL APPEARANCE: healthy, alert and no distress     EYES: EOMI, PERRL     HENT: ear canals and TM's normal and nose and mouth without ulcers or lesions     NECK: no " adenopathy, no asymmetry, masses, or scars and thyroid normal to palpation     RESP: lungs clear to auscultation - no rales, rhonchi or wheezes     CV: regular rates and rhythm, normal S1 S2, no S3 or S4 and no murmur, click or rub     ABDOMEN:  soft, nontender, no HSM or masses and bowel sounds normal     MS: extremities normal- no gross deformities noted, no evidence of inflammation in joints, FROM in all extremities.     SKIN: no suspicious lesions or rashes     NEURO: Normal strength and tone, sensory exam grossly normal, mentation intact and speech normal     PSYCH: mentation appears normal. and affect normal/bright     LYMPHATICS: No cervical adenopathy    DIAGNOSTICS:   EK18, NSR     Recent Labs   Lab Test  07/10/17   1522   HGB  13.3   PLT  246        IMPRESSION:   Reason for surgery/procedure: toe pain   Diagnosis/reason for consult: exertional dyspnea, GERD     The proposed surgical procedure is considered INTERMEDIATE risk.    REVISED CARDIAC RISK INDEX  The patient has the following serious cardiovascular risks for perioperative complications such as (MI, PE, VFib and 3  AV Block):  No serious cardiac risks  INTERPRETATION: 0 risks: Class I (very low risk - 0.4% complication rate)    The patient has the following additional risks for perioperative complications:  No identified additional risks      ICD-10-CM    1. Preop general physical exam Z01.818 Hemoglobin     Basic metabolic panel   2. Hammer toe, unspecified laterality M20.40    3. GERD without esophagitis K21.9    4. Exertional dyspnea R06.09 Hemoglobin     Basic metabolic panel     CT Chest w Contrast       RECOMMENDATIONS:   --Patient is to take all scheduled medications on the day of surgery EXCEPT for modifications listed below.    APPROVAL GIVEN to proceed with proposed procedure, without further diagnostic evaluation       Signed Electronically by: Jocelin Guajardo MD    Copy of this evaluation report is provided to requesting  physician.    [facility] Preop Guidelines

## 2018-03-21 NOTE — LETTER
[address] [place] [address]    March 22, 2018    Brenna Watkins  [address]  [place] [facility] [address]          Dear Brenna,    Your results are normal    Enclosed is a copy of your results.     Results for orders placed or performed in visit on 03/21/18   Hemoglobin   Result Value Ref Range    Hemoglobin 13.9 11.7 - 15.7 g/dL   Basic metabolic panel   Result Value Ref Range    Sodium 139 133 - 144 mmol/L    Potassium 4.2 3.4 - 5.3 mmol/L    Chloride 105 94 - 109 mmol/L    Carbon Dioxide 26 20 - 32 mmol/L    Anion Gap 8 3 - 14 mmol/L    Glucose 83 70 - 99 mg/dL    Urea Nitrogen 13 7 - 30 mg/dL    Creatinine 0.80 0.52 - 1.04 mg/dL    GFR Estimate 69 >60 mL/min/1.7m2    GFR Estimate If Black 83 >60 mL/min/1.7m2    Calcium 9.5 8.5 - 10.1 mg/dL       If you have any questions or concerns, please call myself or my nurse at [phone].      Sincerely,        Jocelin Guajardo MD/ha

## 2018-03-21 NOTE — MR AVS SNAPSHOT
After Visit Summary   3/21/2018    Brenna Watkins    MRN: [id number]           Patient Information     Date Of Birth          1935        Visit Information        Provider Department      3/21/2018 11:20 AM Jocelin Guajardo MD [facility]        Today's Diagnoses     Preop general physical exam    -  1    Hammer toe, unspecified laterality        GERD without esophagitis        Exertional dyspnea          Care Instructions    Call the imaging center at [phone] or  [phone] to schedule your chest CT.    [facility]    If you have any questions regarding to your visit please contact your care team:       Team Red:   Clinic Hours Telephone Number   Dr. Jocelin Hodge, NP   7am-7pm  Monday - Thursday   7am-5pm  Fridays  [phone]  (Appointment scheduling available 24/7)    Questions about your visit?   Team Line  [phone]   Urgent Care - [place] and [place] - 11am-9pm Monday-Friday Saturday-Sunday- 9am-5pm   [place] - 5pm-9pm Monday-Friday Saturday-Sunday- 9am-5pm  [phone] - [place]   [phone] - [place]       What options do I have for visits at the clinic other than the traditional office visit?  To expand how we care for you, many of our providers are utilizing electronic visits (e-visits) and telephone visits, when medically appropriate, for interactions with their patients rather than a visit in the clinic.   We also offer nurse visits for many medical concerns. Just like any other service, we will bill your insurance company for this type of visit based on time spent on the phone with your provider. Not all insurance companies cover these visits. Please check with your medical insurance if this type of visit is covered. You will be responsible for any charges that are not paid by your insurance.      E-visits via [employer]:  generally incur a $35.00 fee.  Telephone  visits:  Time spent on the phone: *charged based on time that is spent on the phone in increments of 10 minutes. Estimated cost:   5-10 mins $30.00   11-20 mins. $59.00   21-30 mins. $85.00     Use [employer] (secure email communication and access to your chart) to send your primary care provider a message or make an appointment. Ask someone on your Team how to sign up for [employer].  For a Price Quote for your services, please call our [employer] Line at [phone].      As always, Thank you for trusting us with your health care needs!  Mary Lou BEBETO CARRASCO      Before Your Surgery      Call your surgeon if there is any change in your health. This includes signs of a cold or flu (such as a sore throat, runny nose, cough, rash or fever).    Do not smoke, drink alcohol or take over the counter medicine (unless your surgeon or primary care doctor tells you to) for the 24 hours before and after surgery.    If you take prescribed drugs: Follow your doctor s orders about which medicines to take and which to stop until after surgery.    Eating and drinking prior to surgery: follow the instructions from your surgeon    Take a shower or bath the night before surgery. Use the soap your surgeon gave you to gently clean your skin. If you do not have soap from your surgeon, use your regular soap. Do not shave or scrub the surgery site.  Wear clean pajamas and have clean sheets on your bed.           Follow-ups after your visit        Follow-up notes from your care team     Return if symptoms worsen or fail to improve.      Future tests that were ordered for you today     Open Future Orders        Priority Expected Expires Ordered    CT Chest w Contrast Routine  3/21/2019 3/21/2018            Who to contact     If you have questions or need follow up information about today's clinic visit or your schedule please contact [facility] directly at [phone].  Normal or non-critical lab and imaging results will be  "communicated to you by MyChart, letter or phone within 4 business days after the clinic has received the results. If you do not hear from us within 7 days, please contact the clinic through [employer] or phone. If you have a critical or abnormal lab result, we will notify you by phone as soon as possible.  Submit refill requests through [email or web address] or call your pharmacy and they will forward the refill request to us. Please allow 3 business days for your refill to be completed.          Additional Information About Your Visit        [email or web address] Information     [email or web address] lets you send messages to your doctor, view your test results, renew your prescriptions, schedule appointments and more. To sign up, go to [email or web address] . Click on \"Log in\" on the left side of the screen, which will take you to the Welcome page. Then click on \"Sign up Now\" on the right side of the page.     You will be asked to enter the access code listed below, as well as some personal information. Please follow the directions to create your username and password.     Your access code is: [email or web address]  Expires: 2018 12:07 PM     Your access code will  in 90 days. If you need help or a new code, please call your [place] clinic or [phone].        Care EveryWhere ID     This is your Care EveryWhere ID. This could be used by other organizations to access your [place] medical records  [id number]        Your Vitals Were     Pulse Temperature Respirations Height Pulse Oximetry Breastfeeding?    85 96.5  F (35.8  C) (Oral) 18 5' 2\" (1.575 m) 97% No    BMI (Body Mass Index)                   26.89 kg/m2            Blood Pressure from Last 3 Encounters:   18 120/76   17 138/88   17 128/80    Weight from Last 3 Encounters:   18 147 lb (66.7 kg)   17 141 lb (64 kg)   17 142 lb (64.4 kg)              We Performed the Following     Basic metabolic panel     Hemoglobin        Primary Care Provider Office Phone " # Fax #    Jocelin Guajardo [phone] [address]  [place] [address]        Equal Access to Services     LIOR GARCIAS : Hadii aad ku hadtamikoo Sogonzálezali, waaxda luqadaha, qaybta kaalmada adekarla, armani candidain hayaamarlee lancasterspeedy ward laDulcelamin margaret. So [facility] [phone].    ATENCIÓN: Si habla español, tiene a james disposición servicios gratuitos de asistencia lingüística. Llame al [phone].    We comply with applicable federal civil rights laws and [place] laws. We do not discriminate on the basis of race, color, national origin, age, disability, sex, sexual orientation, or gender identity.            Thank you!     Thank you for choosing [facility]  for your care. Our goal is always to provide you with excellent care. Hearing back from our patients is one way we can continue to improve our services. Please take a few minutes to complete the written survey that you may receive in the mail after your visit with us. Thank you!             Your Updated Medication List - Protect others around you: Learn how to safely use, store and throw away your medicines at www.disposemymeds.org.          This list is accurate as of 3/21/18 12:07 PM.  Always use your most recent med list.                   Brand Name Dispense Instructions for use Diagnosis    PRILOSEC PO      Take by mouth daily

## 2018-03-26 ENCOUNTER — RADIANT APPOINTMENT (OUTPATIENT)
Dept: CT IMAGING | Facility: CLINIC | Age: 83
End: 2018-03-26
Attending: FAMILY MEDICINE
Payer: COMMERCIAL

## 2018-03-26 DIAGNOSIS — R06.09 EXERTIONAL DYSPNEA: ICD-10-CM

## 2018-03-26 PROCEDURE — 71260 CT THORAX DX C+: CPT | Mod: TC

## 2018-03-26 RX ORDER — IOPAMIDOL 755 MG/ML
59 INJECTION, SOLUTION INTRAVASCULAR ONCE
Status: COMPLETED | OUTPATIENT
Start: 2018-03-26 | End: 2018-03-26

## 2018-03-26 RX ADMIN — IOPAMIDOL 59 ML: 755 INJECTION, SOLUTION INTRAVASCULAR at 10:39

## 2018-03-26 NOTE — PROGRESS NOTES
Mail letter:  Your CT scan show no cause for shortness of breath. Let's proceed with consult with a pulmonologist for your shortness of breath. Call [facility] [phone] or [email or web address]    Jocelin Guajardo MD

## 2018-03-26 NOTE — LETTER
[facility]  [address] [place] [address]. [place] [address]    March 27, 2018    Brenna Watkins  [address]  [facility] [address]          Dear Brenna,  Your CT scan show no cause for shortness of breath. Let's proceed with consult with a pulmonologist for your shortness of breath. Call [facility] [phone] or [email or web address]   Enclosed is a copy of your results.     Results for orders placed or performed in visit on 03/26/18   CT Chest Pulmonary Embolism w Contrast    Narrative    CT CHEST PULMONARY EMBOLISM WITH CONTRAST  3/26/2018 10:44 AM     HISTORY:  Exertional dyspnea.    TECHNIQUE:  59 mL Isovue-370. Radiation dose for this scan was reduced  using automated exposure control, adjustment of the mA and/or kV  according to patient size, or iterative reconstruction technique.    COMPARISON: None.    FINDINGS:  No evidence of pulmonary embolus. No evidence of aortic  aneurysm or dissection. Lungs are clear. No adenopathy. No pleural  fluid. Included upper abdomen is unremarkable.      Impression    IMPRESSION: No evidence of pulmonary embolus. Lungs are clear.     LYNN BAUTISTA MD           If you have any questions or concerns, please call myself or my nurse at [phone].      Sincerely,        Jocelin Guajardo MD /thomas

## 2018-10-29 ENCOUNTER — OFFICE VISIT (OUTPATIENT)
Dept: FAMILY MEDICINE | Facility: CLINIC | Age: 83
End: 2018-10-29
Payer: COMMERCIAL

## 2018-10-29 VITALS
BODY MASS INDEX: 27.6 KG/M2 | WEIGHT: 150 LBS | RESPIRATION RATE: 20 BRPM | HEART RATE: 75 BPM | TEMPERATURE: 97.6 F | SYSTOLIC BLOOD PRESSURE: 150 MMHG | HEIGHT: 62 IN | DIASTOLIC BLOOD PRESSURE: 78 MMHG | OXYGEN SATURATION: 97 %

## 2018-10-29 DIAGNOSIS — Z71.89 ADVANCED DIRECTIVES, COUNSELING/DISCUSSION: ICD-10-CM

## 2018-10-29 DIAGNOSIS — G56.02 CARPAL TUNNEL SYNDROME OF LEFT WRIST: ICD-10-CM

## 2018-10-29 DIAGNOSIS — H61.23 EXCESSIVE CERUMEN IN BOTH EAR CANALS: ICD-10-CM

## 2018-10-29 DIAGNOSIS — I10 HYPERTENSION GOAL BP (BLOOD PRESSURE) < 150/90: ICD-10-CM

## 2018-10-29 DIAGNOSIS — Z01.818 PREOP GENERAL PHYSICAL EXAM: Primary | ICD-10-CM

## 2018-10-29 DIAGNOSIS — R63.5 ABNORMAL WEIGHT GAIN: ICD-10-CM

## 2018-10-29 PROBLEM — R06.09 EXERTIONAL DYSPNEA: Status: RESOLVED | Noted: 2017-07-05 | Resolved: 2018-10-29

## 2018-10-29 LAB
HGB BLD-MCNC: 14 G/DL (ref 11.7–15.7)
TSH SERPL DL<=0.005 MIU/L-ACNC: 2.36 MU/L (ref 0.4–4)

## 2018-10-29 PROCEDURE — 36415 COLL VENOUS BLD VENIPUNCTURE: CPT | Performed by: FAMILY MEDICINE

## 2018-10-29 PROCEDURE — 99214 OFFICE O/P EST MOD 30 MIN: CPT | Performed by: FAMILY MEDICINE

## 2018-10-29 PROCEDURE — 85018 HEMOGLOBIN: CPT | Performed by: FAMILY MEDICINE

## 2018-10-29 PROCEDURE — 93000 ELECTROCARDIOGRAM COMPLETE: CPT | Performed by: FAMILY MEDICINE

## 2018-10-29 PROCEDURE — 84443 ASSAY THYROID STIM HORMONE: CPT | Performed by: FAMILY MEDICINE

## 2018-10-29 NOTE — PROGRESS NOTES
[facility]  [address] [place] [address]  [place] [address]  [phone]  Dept: [phone]    PRE-OP EVALUATION:  Today's date: 10/29/2018    Brenna Watkins (: 1935) presents for pre-operative evaluation assessment as requested by Dr. Gillespie.  She requires evaluation and anesthesia risk assessment prior to undergoing surgery/procedure for treatment of Left wrist carpel tunnel syndrome.    Proposed Surgery/ Procedure: Carpal Tunnel of left wrist  / Arm  Date of Surgery/ Procedure: 18  Time of Surgery/ Procedure:    Hospital/Surgical Facility: [facility]  Fax number for surgical facility:    Primary Physician: Jocelin Guajardo  Type of Anesthesia Anticipated: General    Patient has a Health Care Directive or Living Will:  YES      1. NO - Do you have a history of heart attack, stroke, stent, bypass or surgery on an artery in the head, neck, heart or legs?  2. YES - DO YOU EVER HAVE ANY PAIN OR DISCOMFORT IN YOUR CHEST? With walking  3. NO - Do you have a history of  Heart Failure?  4. YES - ARE YOUR TROUBLED BY SHORTNESS OF BREATH WHEN WALKING ON THE LEVEL, UP A SLIGHT HILL OR AT NIGHT? SOB  5. NO - Do you currently have a cold, bronchitis or other respiratory infection?  6. NO - Do you have a cough, shortness of breath or wheezing?  7. NO - Do you sometimes get pains in the calves of your legs when you walk?  8. NO - Do you or anyone in your family have previous history of blood clots?  9. NO - Do you or does anyone in your family have a serious bleeding problem such as prolonged bleeding following surgeries or cuts?  10. NO - Have you ever had problems with anemia or been told to take iron pills?  11. NO - Have you had any abnormal blood loss such as black, tarry or bloody stools, or abnormal vaginal bleeding?  12. NO - Have you ever had a blood transfusion?  13. NO - Have you or any of your relatives ever had problems with anesthesia?  14. NO - Do you have sleep apnea, excessive  snoring or daytime drowsiness?  15. NO - Do you have any prosthetic heart valves?  16. NO - Do you have prosthetic joints?  17. NO - Is there any chance that you may be pregnant?    HPI:   Brenna Watkins is a 83 year old female who presents with carpel tunnel syndrome. Symptom onset has been gradual, worsening for a time period of months. Severity is described as mild. Course of her symptoms over time is worsening.    See problem list for active medical problems.  Problems all longstanding and stable, except as noted/documented.  See ROS for pertinent symptoms related to these conditions.                                                                                                                                                          .    MEDICAL HISTORY:     Patient Active Problem List    Diagnosis Date Noted     Hypertension goal BP (blood pressure) < 150/90 07/05/2017     Priority: Medium     CTS (carpal tunnel syndrome)      Priority: Medium     GERD without esophagitis      Priority: Low     Osteopenia      Priority: Low     CARDIOVASCULAR SCREENING; LDL GOAL LESS THAN 160 06/13/2017     Priority: Low     Advanced directives, counseling/discussion 06/13/2017     Priority: Low      Past Medical History:   Diagnosis Date     Brain aneurysm      Bunion      CTS (carpal tunnel syndrome)      GERD without esophagitis      Hypertension goal BP (blood pressure) < 150/90 7/5/2017     Osteopenia      Past Surgical History:   Procedure Laterality Date     APPENDECTOMY       BACK SURGERY       CARPAL TUNNEL RELEASE RT/LT Right      CRANIOTOMY, REPAIR ANEURYSM, COMBINED       STRIP VEIN       TOE SURGERY  03/2018     Current Outpatient Prescriptions   Medication Sig Dispense Refill     Omeprazole (PRILOSEC PO) Take by mouth daily       OTC products: None, except as noted above    Allergies   Allergen Reactions     Penicillins       Latex Allergy: NO    Social History   Substance Use Topics     Smoking status: Never  "Smoker     Smokeless tobacco: Never Used     Alcohol use 0.6 - 1.2 oz/week     1 - 2 Glasses of wine per week     History   Drug Use No     Social History     Social History     Marital status:      Spouse name: Rishabh      Number of children: 4     Years of education: 12     Occupational History     , [employer] and [employer]  Retired     Social History Main Topics     Smoking status: Never Smoker     Smokeless tobacco: Never Used     Alcohol use 0.6 - 1.2 oz/week     1 - 2 Glasses of wine per week     Drug use: No     Sexual activity: Not Currently     Partners: Male     Birth control/ protection: Post-menopausal     Other Topics Concern     Parent/Sibling W/ Cabg, Mi Or Angioplasty Before 65f 55m? No     Social History Narrative     Family History   Problem Relation Age of Onset     Sudden Death Mother      d. childbirth      Alcoholism Father 62     Myocardial Infarction Brother 68     Alzheimer Disease Sister      Diabetes No family hx of      Cancer No family hx of       REVIEW OF SYSTEMS:   CONSTITUTIONAL:POSITIVE  for weight gain  INTEGUMENTARY/SKIN: NEGATIVE for worrisome rashes, moles or lesions  EYES: NEGATIVE for vision changes or irritation  ENT/MOUTH: ear wax   RESP: chronic dyspnea on exertion  CV: NEGATIVE for chest pain, palpitations or peripheral edema  GI: NEGATIVE for nausea, abdominal pain, heartburn, or change in bowel habits  : NEGATIVE for frequency, dysuria, or hematuria  MUSCULOSKELETAL: left wrist pain, low back pain   NEURO: left hand dysesthesias   ENDOCRINE: NEGATIVE for temperature intolerance, skin/hair changes  HEME: NEGATIVE for bleeding problems  PSYCHIATRIC: NEGATIVE for changes in mood or affect  Complete ROS otherwise negative.     EXAM:   /78  Pulse 75  Temp 97.6  F (36.4  C) (Oral)  Resp 20  Ht 5' 2\" (1.575 m)  Wt 150 lb (68 kg)  SpO2 97%  Breastfeeding? No  BMI 27.44 kg/m2    GENERAL APPEARANCE: healthy, alert and no distress     EYES: EOMI, PERRL     HENT: " ear canals and TM's normal and nose and mouth without ulcers or lesions     NECK: no adenopathy, no asymmetry, masses, or scars and thyroid normal to palpation     RESP: lungs clear to auscultation - no rales, rhonchi or wheezes     CV: regular rates and rhythm, normal S1 S2, no S3 or S4 and no murmur, click or rub     ABDOMEN:  soft, nontender, no HSM or masses and bowel sounds normal     MS: extremities normal- no gross deformities noted, no evidence of inflammation in joints, FROM in all extremities.     SKIN: no suspicious lesions or rashes     NEURO: Normal strength and tone, sensory exam grossly normal, mentation intact and speech normal     PSYCH: mentation appears normal. and affect normal/bright     LYMPHATICS: No cervical adenopathy    DIAGNOSTICS:   EKG: appears normal, NSR, normal axis, normal intervals, no acute ST/T changes c/w ischemia, no LVH by voltage criteria, unchanged from previous tracings    Recent Labs   Lab Test  03/21/18   1220  07/10/17   1522   HGB  13.9  13.3   PLT   --   246   NA  139   --    POTASSIUM  4.2   --    CR  0.80   --         IMPRESSION:   Reason for surgery/procedure: left carpel tunnel syndrome   Diagnosis/reason for consult: hypertension     The proposed surgical procedure is considered INTERMEDIATE risk.    REVISED CARDIAC RISK INDEX  The patient has the following serious cardiovascular risks for perioperative complications such as (MI, PE, VFib and 3  AV Block):  No serious cardiac risks  INTERPRETATION: 0 risks: Class I (very low risk - 0.4% complication rate)    The patient has the following additional risks for perioperative complications:  No identified additional risks      ICD-10-CM    1. Preop general physical exam Z01.818 EKG 12-lead complete w/read - Clinics     Hemoglobin   2. Carpal tunnel syndrome of left wrist G56.02    3. Hypertension goal BP (blood pressure) < 150/90 I10    4. Advanced directives, counseling/discussion Z71.89    5. Abnormal weight gain  R63.5 TSH with free T4 reflex   6. Excessive cerumen in both ear canals H61.23 OTOLARYNGOLOGY REFERRAL       RECOMMENDATIONS:   --Patient is to take all scheduled medications on the day of surgery EXCEPT for modifications listed below.    APPROVAL GIVEN to proceed with proposed procedure, without further diagnostic evaluation       Signed Electronically by: Jocelin Guajardo MD    Copy of this evaluation report is provided to requesting physician.    [facility] Preop Guidelines    Revised Cardiac Risk Index

## 2018-10-29 NOTE — PATIENT INSTRUCTIONS
Did you know you can lower your blood pressure with your daily habits?    *Losing 20 pounds of weight lowers blood pressure 5 to 20 points.  *Eating a low-fat diet rich in fruits, vegetables and low-fat dairy lowers blood pressure 8 to 14 points.  *Eating a low-salt diet lowers blood pressure 2 to 8 points.  *Exercising regularly lowers blood pressure 4 to 9 points.  *Reducing alcohol use lowers blood pressure 2 to 4 points.      [facility]    If you have any questions regarding to your visit please contact your care team:       Team Red:   Clinic Hours Telephone Number   Dr. Jocelin Hodge, NP 7am-7pm  Monday - Thursday   7am-5pm  Fridays  [phone]  (Appointment scheduling available 24/7)   Urgent Care - [place] - 11am-9pm Monday-Friday Saturday-Sunday- 9am-5pm   [place] - 5pm-9pm Monday-Friday Saturday-Sunday- 9am-5pm  [phone] - [place]  [phone] - [place]       What options do I have for a visit other than an office visit? We offer electronic visits (e-visits) and telephone visits, when medically appropriate.  Please check with your medical insurance to see if these types of visits are covered, as you will be responsible for any charges that are not paid by your insurance.      You can use [employer] (secure electronic communication) to access to your chart, send your primary care provider a message, or make an appointment. Ask a team member how to get started.     For a price quote for your services, please call our Consumer Price Line at [phone] or our Imaging Cost estimation line at [phone] (for imaging tests).  Mary Lou TATE MA        Before Your Surgery      Call your surgeon if there is any change in your health. This includes signs of a cold or flu (such as a sore throat, runny nose, cough, rash or fever).    Do not smoke, drink alcohol or take over the counter medicine (unless your  surgeon or primary care doctor tells you to) for the 24 hours before and after surgery.    If you take prescribed drugs: Follow your doctor s orders about which medicines to take and which to stop until after surgery.    Eating and drinking prior to surgery: follow the instructions from your surgeon    Take a shower or bath the night before surgery. Use the soap your surgeon gave you to gently clean your skin. If you do not have soap from your surgeon, use your regular soap. Do not shave or scrub the surgery site.  Wear clean pajamas and have clean sheets on your bed.       A normal weight for you is 138 pounds.  Obesity (BMI at or over 30) raises risk of the leading 2 causes of death in our state and country -- heart disease and cancer.  Treatment includes the followin.  Eat 5 to 9 servings of fruits and/or vegetables every day.  2.  Choose whole grain carbohydrates such as wheat bread, brown rice, whole grain cereal and wheat pasta, in small quantities.  3.  Choose chicken and/or fish in place of beef.  4.  Use olive oil instead of butter.  5.  Instead of red meat and sweets, eat a small handful of nuts several times a week.  6.  Drink more water in place of sweetened beverages.  7.  Try the plate method:  1/2 fruits and vegetables, 1/4 whole grains, 1/4 chicken or fish (check out choosemyplate.gov).    8.  Exercise a total of 2.5 hours weekly.  Start with 10 minutes three times weekly and increase weekly until you are at the goal.  9.  Sleep 7 to 8 hours a night.    You are welcome to schedule a free weight check in 6 to 8 weeks.  Call our appointment line at [phone] (press #2 for appointments) for an ancillary visit.

## 2018-10-29 NOTE — MR AVS SNAPSHOT
After Visit Summary   10/29/2018    Brenna Watkins    MRN: [id number]           Patient Information     Date Of Birth          1935        Visit Information        Provider Department      10/29/2018 9:40 AM Jocelin Guajardo MD [facility]        Today's Diagnoses     Preop general physical exam    -  1    Carpal tunnel syndrome of left wrist        Hypertension goal BP (blood pressure) < 150/90        Advanced directives, counseling/discussion        Abnormal weight gain        Excessive cerumen in both ear canals          Care Instructions    Did you know you can lower your blood pressure with your daily habits?    *Losing 20 pounds of weight lowers blood pressure 5 to 20 points.  *Eating a low-fat diet rich in fruits, vegetables and low-fat dairy lowers blood pressure 8 to 14 points.  *Eating a low-salt diet lowers blood pressure 2 to 8 points.  *Exercising regularly lowers blood pressure 4 to 9 points.  *Reducing alcohol use lowers blood pressure 2 to 4 points.      [facility]    If you have any questions regarding to your visit please contact your care team:       Team Red:   Clinic Hours Telephone Number   Dr. Jocelin Hodge, NP 7am-7pm  Monday - Thursday   7am-5pm  Fridays  [phone]  (Appointment scheduling available 24/7)   [facility] [place] [facility] - 11am-9pm Monday-Friday Saturday-Sunday- 9am-5pm   [place] - 5pm-9pm Monday-Friday Saturday-Sunday- 9am-5pm  [phone] - [place]  [phone] - [place]       What options do I have for a visit other than an office visit? We offer electronic visits (e-visits) and telephone visits, when medically appropriate.  Please check with your medical insurance to see if these types of visits are covered, as you will be responsible for any charges that are not paid by your insurance.      You can use [employer] (secure electronic  communication) to access to your chart, send your primary care provider a message, or make an appointment. Ask a team member how to get started.     For a price quote for your services, please call our Consumer Price Line at [phone] or our Imaging Cost estimation line at [phone] (for imaging tests).  Mary Lou TATE MA        Before Your Surgery      Call your surgeon if there is any change in your health. This includes signs of a cold or flu (such as a sore throat, runny nose, cough, rash or fever).    Do not smoke, drink alcohol or take over the counter medicine (unless your surgeon or primary care doctor tells you to) for the 24 hours before and after surgery.    If you take prescribed drugs: Follow your doctor s orders about which medicines to take and which to stop until after surgery.    Eating and drinking prior to surgery: follow the instructions from your surgeon    Take a shower or bath the night before surgery. Use the soap your surgeon gave you to gently clean your skin. If you do not have soap from your surgeon, use your regular soap. Do not shave or scrub the surgery site.  Wear clean pajamas and have clean sheets on your bed.       A normal weight for you is 138 pounds.  Obesity (BMI at or over 30) raises risk of the leading 2 causes of death in our state and country -- heart disease and cancer.  Treatment includes the followin.  Eat 5 to 9 servings of fruits and/or vegetables every day.  2.  Choose whole grain carbohydrates such as wheat bread, brown rice, whole grain cereal and wheat pasta, in small quantities.  3.  Choose chicken and/or fish in place of beef.  4.  Use olive oil instead of butter.  5.  Instead of red meat and sweets, eat a small handful of nuts several times a week.  6.  Drink more water in place of sweetened beverages.  7.  Try the plate method:  1/2 fruits and vegetables, 1/4 whole grains, 1/4 chicken or fish (check out choosemyplate.gov).    8.  Exercise a total of  2.5 hours weekly.  Start with 10 minutes three times weekly and increase weekly until you are at the goal.  9.  Sleep 7 to 8 hours a night.    You are welcome to schedule a free weight check in 6 to 8 weeks.  Call our appointment line at [phone] (press #2 for appointments) for an ancillary visit.                  Follow-ups after your visit        Additional Services     OTOLARYNGOLOGY REFERRAL       Your provider has referred you to: [facility]: [facility] [phone]   [email or web address]  Dr. Soni     Please be aware that coverage of these services is subject to the terms and limitations of your health insurance plan.  Call member services at your health plan with any benefit or coverage questions.      Please bring the following with you to your appointment:    (1) Any X-Rays, CTs or MRIs which have been performed.  Contact the facility where they were done to arrange for  prior to your scheduled appointment.   (2) List of current medications  (3) This referral request   (4) Any documents/labs given to you for this referral                  Follow-up notes from your care team     Return in about 1 month (around 11/29/2018) for Wellness visit.      Who to contact     If you have questions or need follow up information about today's clinic visit or your schedule please contact [facility] directly at [phone].  Normal or non-critical lab and imaging results will be communicated to you by MyChart, letter or phone within 4 business days after the clinic has received the results. If you do not hear from us within 7 days, please contact the clinic through MyChart or phone. If you have a critical or abnormal lab result, we will notify you by phone as soon as possible.  Submit refill requests through [email or web address] or call your pharmacy and they will forward the refill request to us. Please allow 3 business days for your refill to be completed.           "Additional Information About Your Visit        Care EveryWhere ID     This is your Care EveryWhere ID. This could be used by other organizations to access your [facility] medical records  [id number]        Your Vitals Were     Pulse Temperature Respirations Height Pulse Oximetry Breastfeeding?    75 97.6  F (36.4  C) (Oral) 20 5' 2\" (1.575 m) 97% No    BMI (Body Mass Index)                   27.44 kg/m2            Blood Pressure from Last 3 Encounters:   10/29/18 150/78   03/21/18 120/76   09/05/17 138/88    Weight from Last 3 Encounters:   10/29/18 150 lb (68 kg)   03/21/18 147 lb (66.7 kg)   08/30/17 141 lb (64 kg)              We Performed the Following     EKG 12-lead complete w/read - Clinics     Hemoglobin     OTOLARYNGOLOGY REFERRAL     TSH with free T4 reflex        Primary Care Provider Office Phone # Fax #    Jocelin Guajardo [phone] [address] [facility] [address]        Equal Access to Services     [facility]: Hadii aad ku hadasho Soomaali, waaxda luqadaha, qaybta kaalmada adeegyada, waxay phill [facility] . So [facility] [phone].    ATENCIÓN: Si habla español, tiene a james disposición servicios gratuitos de asistencia lingüística. Llame al [phone].    We comply with applicable federal civil rights laws and [place] laws. We do not discriminate on the basis of race, color, national origin, age, disability, sex, sexual orientation, or gender identity.            Thank you!     Thank you for choosing [facility]  for your care. Our goal is always to provide you with excellent care. Hearing back from our patients is one way we can continue to improve our services. Please take a few minutes to complete the written survey that you may receive in the mail after your visit with us. Thank you!             Your Updated Medication List - Protect others around you: Learn how to safely use, store and throw away your medicines at " www.disposemymeds.org.          This list is accurate as of 10/29/18 10:38 AM.  Always use your most recent med list.                   Brand Name Dispense Instructions for use Diagnosis    PRILOSEC PO      Take by mouth daily

## 2018-10-29 NOTE — LETTER
[address] [place] [address]    October 30, 2018    Brenna Watkins  [address]  [place] [address]          Dear Brenna,    Your results are normal      Enclosed is a copy of your results.     Results for orders placed or performed in visit on 10/29/18   Hemoglobin   Result Value Ref Range    Hemoglobin 14.0 11.7 - 15.7 g/dL   TSH with free T4 reflex   Result Value Ref Range    TSH 2.36 0.40 - 4.00 mU/L       If you have any questions or concerns, please call myself or my nurse at [phone].      Sincerely,        Jocelin Guajardo MD/ha

## 2018-12-28 ENCOUNTER — TRANSFERRED RECORDS (OUTPATIENT)
Dept: HEALTH INFORMATION MANAGEMENT | Facility: CLINIC | Age: 83
End: 2018-12-28

## 2019-01-14 DIAGNOSIS — M54.50 LUMBAR PAIN: Primary | ICD-10-CM

## 2019-01-15 ENCOUNTER — PRE VISIT (OUTPATIENT)
Dept: ORTHOPEDICS | Facility: CLINIC | Age: 84
End: 2019-01-15

## 2019-01-15 ENCOUNTER — OFFICE VISIT (OUTPATIENT)
Dept: ORTHOPEDICS | Facility: CLINIC | Age: 84
End: 2019-01-15
Payer: COMMERCIAL

## 2019-01-15 ENCOUNTER — ANCILLARY PROCEDURE (OUTPATIENT)
Dept: GENERAL RADIOLOGY | Facility: CLINIC | Age: 84
End: 2019-01-15
Payer: COMMERCIAL

## 2019-01-15 VITALS — HEIGHT: 62 IN | BODY MASS INDEX: 27.6 KG/M2 | WEIGHT: 150 LBS

## 2019-01-15 DIAGNOSIS — M54.16 LUMBAR RADICULOPATHY: Primary | ICD-10-CM

## 2019-01-15 ASSESSMENT — MIFFLIN-ST. JEOR: SCORE: 1098.65

## 2019-01-15 NOTE — PROGRESS NOTES
Spine Surgery Consultation    REFERRING PHYSICIAN: Referred Self   PRIMARY CARE PHYSICIAN: Jocelin Guajardo           Chief Complaint:   Consult (2nd opinion, low back pain )    History of Present Illness:  Symptom Profile Including: location of symptoms, onset, severity, exacerbating/alleviating factors, previous treatments:        Brenna Watkins is a 83 year old female who is approximately 4 years status post posterior L4-L5 fusion who presents for second opinion regarding ongoing low back and pain radiating down into her bilateral legs.  This 1 day and states that she has had approximately a decade of low back pain tried a number of conservative management including physical therapy, oral medications mainly consisting of Tylenol but recent prescription for Tylenol with Codeine that she ran out of in the last few days.  Approximately 4 years ago she underwent operative management with Dr. Rolle of [facility] orth[facility].  She states that she woke up after the surgery still having the burning pain down her legs, this pain did not resolve.  She states that the pain she is having today is the same pain that she had leading up to surgery and felt no resolution from the surgery at that time.  She is here seeking a second opinion.    She localizes her pain to mainly the right to the buttock with radiation down the right lateral thigh.  She states she intermittently has pains extending down the front of the shin and the top of the foot.  When the pain is at its worst she actually has pain of a similar nature down the left leg she also notes some numbness in the top of her right foot that was present prior to surgery and has not changed or improved since since surgery she does feel that her symptoms feel better when she leans forward.      She has tried a number of injections to her low back over the years including within 1 month postoperatively after the fusion surgery she states that none of these relieved her  "symptoms for more than a few weeks at a time.         Past Medical History:     Past Medical History:   Diagnosis Date     Brain aneurysm      Bunion      CTS (carpal tunnel syndrome)      GERD without esophagitis      Hypertension goal BP (blood pressure) < 150/90 7/5/2017     Osteopenia           Past Surgical History:     Past Surgical History:   Procedure Laterality Date     APPENDECTOMY       BACK SURGERY       CARPAL TUNNEL RELEASE RT/LT Right      CRANIOTOMY, REPAIR ANEURYSM, COMBINED       STRIP VEIN       TOE SURGERY  03/2018          Social History:     Social History     Tobacco Use     Smoking status: Never Smoker     Smokeless tobacco: Never Used   Substance Use Topics     Alcohol use: Yes     Alcohol/week: 0.6 - 1.2 oz     Types: 1 - 2 Glasses of wine per week            Family History:     Family History   Problem Relation Age of Onset     Sudden Death Mother         d. childbirth      Alcoholism Father 62     Myocardial Infarction Brother 68     Alzheimer Disease Sister      Diabetes No family hx of      Cancer No family hx of           Allergies:     Allergies   Allergen Reactions     Penicillins           Medications:     Current Outpatient Medications   Medication     Omeprazole (PRILOSEC PO)     No current facility-administered medications for this visit.           Review of Systems:     A 10 point ROS was performed and reviewed. Specific responses to these questions are noted at the end of the document.         Physical Exam:   Vitals: Ht 1.575 m (5' 2\")   Wt 68 kg (150 lb)   BMI 27.44 kg/m    Constitutional: awake, alert, cooperative, no apparent distress, appears stated age.    Eyes: The sclera are white.  Ears, Nose, Throat: The trachea is midline.  Psychiatric: The patient has a normal affect.  Respiratory: breathing non-labored  Cardiovascular: The extremities are warm and perfused.  Skin: no obvious rashes or lesions.  Musculoskeletal, Neurologic, and Spine:        Lumbar " Spine:    Appearance - No gross stepoffs or deformities    Motor -     L2-3: Hip flexion 5/5 R and 5/5 L strength          L3/4:  Knee extension R 5/5 and L 5/5 strength         L4/5:  Foot dorsiflexion R 4+/5 L 5/5 and       EHL dorsiflexion R 3+/5 L 4+/5 strength         S1:  Plantarflexion/Peroneal Muscles  R 5/5 and L 5/5 strength    Sensation: intact to light touch L3-S1 distribution BLE besides decreased sensation in right L5       Neurologic:      REFLEXES Left Right   Patella 1+ 1+   Ankle jerk 1+ 1+   Babinski No upgoing great toe No upgoing great toe   Clonus 0 beats 0 beats     Hip Exam:  No pain with hip log roll and no tenderness over the greater trochanters.    Alignment:  Patient stands with a neutral standing sagittal balance.         Imaging:   We ordered and independently reviewed new radiographs at this clinic visit. The results were discussed with the patient.  Findings include:   December 26, 2018 lumbar MRI: Cemented fusion from L4-L5 with wide central decompression.  No severe residual foraminal stenosis at the instrumented level.  Mild to moderate right L5-s1 foraminal stenosis.  Severe left L3-4 foraminal stenosis     AP lateral flexion-extension radiographs from January 15, 2019 shows abundant posterolateral fusion between L4 and L5 with mild to moderate adjacent segment changes with some disc space height loss at L5-S1.             Assessment and Plan:   Assessment:  83 year old female with previous L4-L5 fusion with radiographic evidence of fusion, now with right L5 radiculopathy and evidence of mild to moderate right L5-S1 foraminal stenosis.  Her signs and symptoms are most consistent with right L5 radiculopathy and radiographic evidence for that was found on the MRI today.  Interestingly she does have some significant L3 compression on the left but no significant sensory motor or pain complaints that fit this distribution.  Based on her complaints symptoms and goals seems most  reasonable to attempt to address the right L5 nerve root and the patient is in agreement that she would like the minimal amount done to help improve her quality of life.    We discussed operative interventions and felt that she was most likely indicated for an L5-S1 posterior interbody fusion with a right-sided facetectomy to decompress the exiting L5 nerve root.  At this point the patient expressed that she would like to spend some time thinking about it, with her initial plan to be for her to call if she would like a CT of her of her lumbar spine for surgical planning and additionally we would schedule her for an anesthesia preop visit and then would see Dr. Singer back in clinic for operative conversation.  On the way out the door the patient requested that she be scheduled for the CT lumbar spine this was done she can call to schedule follow-up as needed.    Attending MD (Dr. Jrodi Singer) :  I reviewed and verified the history and physical exam of the patient and discussed the patient's management with the other clinical providers involved in this patient's care including any involved residents or physicians assistants. I reviewed the above note and agree with the documented findings and plan of care, which were communicated to the patient.      Jordi Singer MD    Plan if interested in operative intervention:  1. CT lumbar spine  2. Anesthesia pre-op clinic clearance  3. Pre-op visit with Dr. Singer    Follow up if interested in operative management.     Nazario Wang MD   Orthopaedic Surgery Resident       Respectfully,  Jordi Singer MD  Spine Surgery  [facility]

## 2019-01-15 NOTE — TELEPHONE ENCOUNTER
RECORDS RECEIVED FROM: [facility]   DATE RECEIVED: IN PROCESS   NOTES STATUS DETAILS   OFFICE NOTE from referring provider In process    OFFICE NOTE from other specialist In process    DISCHARGE SUMMARY from hospital N/A    DISCHARGE REPORT from the ER N/A    OPERATIVE REPORT In process    MEDICATION LIST Internal    IMPLANT RECORD/STICKER N/A    LABS     CBC/DIFF Internal CBC DONE IN 2017 WAITING OUTSIDE RECORDS   CULTURES N/A    INJECTIONS DONE IN RADIOLOGY In process    MRI In process PER APPOINTMENT NOTES PT WILL HAND CARRY MRI REQUESTED ANY IMAGING FROM  ORTHOPEDICS   CT SCAN In process    XRAYS (IMAGES & REPORTS) In process    TUMOR     PATHOLOGY  Slides & report N/A

## 2019-01-15 NOTE — NURSING NOTE
"Reason For Visit:   Chief Complaint   Patient presents with     Consult     2nd opinion, low back pain        Primary MD: Jocelin Guajardo  Ref. MD: Self    ?  No  Occupation retired.  Currently working? No.  Work status?  Retired.  Date of injury: 2014  Type of injury: chronic .  Date of surgery: 2014 [place]   Type of surgery: fusion   Smoker: No  Request smoking cessation information: No    Ht 1.575 m (5' 2\")   Wt 68 kg (150 lb)   BMI 27.44 kg/m      Pain Assessment  Patient Currently in Pain: Yes  0-10 Pain Scale: 8  Primary Pain Location: Back  Pain Descriptors: Radiating    Oswestry ([place]) Questionnaire    OSWESTRY DISABILITY INDEX 1/15/2019   Count 9   Sum 26   Oswestry Score (%) 57.78   Some recent data might be hidden            Neck Disability Index (NDI) Questionnaire    No flowsheet data found.                Promis 10 Assessment    PROMIS 10 1/15/2019   In general, would you say your health is: Fair   In general, would you say your quality of life is: Good   In general, how would you rate your physical health? Good   In general, how would you rate your mental health, including your mood and your ability to think? Good   In general, how would you rate your satisfaction with your social activities and relationships? Fair   In general, please rate how well you carry out your usual social activities and roles Fair   To what extent are you able to carry out your everyday physical activities such as walking, climbing stairs, carrying groceries, or moving a chair? Moderately   How often have you been bothered by emotional problems such as feeling anxious, depressed or irritable? Rarely   How would you rate your fatigue on average? Moderate   How would you rate your pain on average?   0 = No Pain  to  10 = Worst Imaginable Pain 6   In general, would you say your health is: 2   In general, would you say your quality of life is: 3   In general, how would you rate your physical health? 3   In " general, how would you rate your mental health, including your mood and your ability to think? 3   In general, how would you rate your satisfaction with your social activities and relationships? 2   In general, please rate how well you carry out your usual social activities and roles. (This includes activities at home, at work and in your community, and responsibilities as a parent, child, spouse, employee, friend, etc.) 2   To what extent are you able to carry out your everyday physical activities such as walking, climbing stairs, carrying groceries, or moving a chair? 3   In the past 7 days, how often have you been bothered by emotional problems such as feeling anxious, depressed, or irritable? 2   In the past 7 days, how would you rate your fatigue on average? 3   In the past 7 days, how would you rate your pain on average, where 0 means no pain, and 10 means worst imaginable pain? 6   Global Mental Health Score 12   Global Physical Health Score 12   PROMIS TOTAL - SUBSCORES 24   Some recent data might be hidden                Luis Hernandez ATC

## 2019-01-15 NOTE — LETTER
1/15/2019       RE: Brenna Watkins  [address]  Located within [place] [address]     Dear Colleague,    Thank you for referring your patient, Brenna Watkins, to the [facility]. Please see a copy of my visit note below.    Spine Surgery Consultation    REFERRING PHYSICIAN: Referred Self   PRIMARY CARE PHYSICIAN: Jocelin Guajardo           Chief Complaint:   Consult (2nd opinion, low back pain )    History of Present Illness:  Symptom Profile Including: location of symptoms, onset, severity, exacerbating/alleviating factors, previous treatments:        Brenna Watkins is a 83 year old female who is approximately 4 years status post posterior L4-L5 fusion who presents for second opinion regarding ongoing low back and pain radiating down into her bilateral legs.  This 1 day and states that she has had approximately a decade of low back pain tried a number of conservative management including physical therapy, oral medications mainly consisting of Tylenol but recent prescription for Tylenol with Codeine that she ran out of in the last few days.  Approximately 4 years ago she underwent operative management with Dr. Rolle of [facility] orth[facility].  She states that she woke up after the surgery still having the burning pain down her legs, this pain did not resolve.  She states that the pain she is having today is the same pain that she had leading up to surgery and felt no resolution from the surgery at that time.  She is here seeking a second opinion.    She localizes her pain to mainly the right to the buttock with radiation down the right lateral thigh.  She states she intermittently has pains extending down the front of the shin and the top of the foot.  When the pain is at its worst she actually has pain of a similar nature down the left leg she also notes some numbness in the top of her right foot that was present prior to surgery and has not changed  "or improved since since surgery she does feel that her symptoms feel better when she leans forward.      She has tried a number of injections to her low back over the years including within 1 month postoperatively after the fusion surgery she states that none of these relieved her symptoms for more than a few weeks at a time.         Past Medical History:     Past Medical History:   Diagnosis Date     Brain aneurysm      Bunion      CTS (carpal tunnel syndrome)      GERD without esophagitis      Hypertension goal BP (blood pressure) < 150/90 7/5/2017     Osteopenia           Past Surgical History:     Past Surgical History:   Procedure Laterality Date     APPENDECTOMY       BACK SURGERY       CARPAL TUNNEL RELEASE RT/LT Right      CRANIOTOMY, REPAIR ANEURYSM, COMBINED       STRIP VEIN       TOE SURGERY  03/2018          Social History:     Social History     Tobacco Use     Smoking status: Never Smoker     Smokeless tobacco: Never Used   Substance Use Topics     Alcohol use: Yes     Alcohol/week: 0.6 - 1.2 oz     Types: 1 - 2 Glasses of wine per week            Family History:     Family History   Problem Relation Age of Onset     Sudden Death Mother         d. childbirth      Alcoholism Father 62     Myocardial Infarction Brother 68     Alzheimer Disease Sister      Diabetes No family hx of      Cancer No family hx of           Allergies:     Allergies   Allergen Reactions     Penicillins           Medications:     Current Outpatient Medications   Medication     Omeprazole (PRILOSEC PO)     No current facility-administered medications for this visit.           Review of Systems:     A 10 point ROS was performed and reviewed. Specific responses to these questions are noted at the end of the document.         Physical Exam:   Vitals: Ht 1.575 m (5' 2\")   Wt 68 kg (150 lb)   BMI 27.44 kg/m     Constitutional: awake, alert, cooperative, no apparent distress, appears stated age.    Eyes: The sclera are white.  Ears, " Nose, Throat: The trachea is midline.  Psychiatric: The patient has a normal affect.  Respiratory: breathing non-labored  Cardiovascular: The extremities are warm and perfused.  Skin: no obvious rashes or lesions.  Musculoskeletal, Neurologic, and Spine:        Lumbar Spine:    Appearance - No gross stepoffs or deformities    Motor -     L2-3: Hip flexion 5/5 R and 5/5 L strength          L3/4:  Knee extension R 5/5 and L 5/5 strength         L4/5:  Foot dorsiflexion R 4+/5 L 5/5 and       EHL dorsiflexion R 3+/5 L 4+/5 strength         S1:  Plantarflexion/Peroneal Muscles  R 5/5 and L 5/5 strength    Sensation: intact to light touch L3-S1 distribution BLE besides decreased sensation in right L5       Neurologic:      REFLEXES Left Right   Patella 1+ 1+   Ankle jerk 1+ 1+   Babinski No upgoing great toe No upgoing great toe   Clonus 0 beats 0 beats     Hip Exam:  No pain with hip log roll and no tenderness over the greater trochanters.    Alignment:  Patient stands with a neutral standing sagittal balance.         Imaging:   We ordered and independently reviewed new radiographs at this clinic visit. The results were discussed with the patient.  Findings include:   December 26, 2018 lumbar MRI: Cemented fusion from L4-L5 with wide central decompression.  No severe residual foraminal stenosis at the instrumented level.  Mild to moderate right L5-s1 foraminal stenosis.  Severe left L3-4 foraminal stenosis     AP lateral flexion-extension radiographs from January 15, 2019 shows abundant posterolateral fusion between L4 and L5 with mild to moderate adjacent segment changes with some disc space height loss at L5-S1.             Assessment and Plan:   Assessment:  83 year old female with previous L4-L5 fusion with radiographic evidence of fusion, now with right L5 radiculopathy and evidence of mild to moderate right L5-S1 foraminal stenosis.  Her signs and symptoms are most consistent with right L5 radiculopathy and  radiographic evidence for that was found on the MRI today.  Interestingly she does have some significant L3 compression on the left but no significant sensory motor or pain complaints that fit this distribution.  Based on her complaints symptoms and goals seems most reasonable to attempt to address the right L5 nerve root and the patient is in agreement that she would like the minimal amount done to help improve her quality of life.    We discussed operative interventions and felt that she was most likely indicated for an L5-S1 posterior interbody fusion with a right-sided facetectomy to decompress the exiting L5 nerve root.  At this point the patient expressed that she would like to spend some time thinking about it, with her initial plan to be for her to call if she would like a CT of her of her lumbar spine for surgical planning and additionally we would schedule her for an anesthesia preop visit and then would see Dr. Singer back in clinic for operative conversation.  On the way out the door the patient requested that she be scheduled for the CT lumbar spine this was done she can call to schedule follow-up as needed.    Attending MD (Dr. Jordi Singer) :  I reviewed and verified the history and physical exam of the patient and discussed the patient's management with the other clinical providers involved in this patient's care including any involved residents or physicians assistants. I reviewed the above note and agree with the documented findings and plan of care, which were communicated to the patient.      Jordi Singer MD    Plan if interested in operative intervention:  1. CT lumbar spine  2. Anesthesia pre-op clinic clearance  3. Pre-op visit with Dr. Singer    Follow up if interested in operative management.     Nazario Wang MD   Orthopaedic Surgery Resident       Respectfully,  Jordi Singer MD  Spine Surgery  [facility]

## 2019-01-16 PROBLEM — M54.16 LUMBAR RADICULOPATHY: Status: ACTIVE | Noted: 2019-01-16

## 2019-02-12 ENCOUNTER — OFFICE VISIT (OUTPATIENT)
Dept: FAMILY MEDICINE | Facility: CLINIC | Age: 84
End: 2019-02-12
Payer: COMMERCIAL

## 2019-02-12 VITALS
HEART RATE: 97 BPM | RESPIRATION RATE: 20 BRPM | BODY MASS INDEX: 29.08 KG/M2 | SYSTOLIC BLOOD PRESSURE: 136 MMHG | OXYGEN SATURATION: 95 % | WEIGHT: 158 LBS | TEMPERATURE: 97 F | DIASTOLIC BLOOD PRESSURE: 76 MMHG | HEIGHT: 62 IN

## 2019-02-12 DIAGNOSIS — I10 HYPERTENSION GOAL BP (BLOOD PRESSURE) < 140/90: ICD-10-CM

## 2019-02-12 DIAGNOSIS — Z01.818 PREOP GENERAL PHYSICAL EXAM: Primary | ICD-10-CM

## 2019-02-12 DIAGNOSIS — R82.81 PYURIA: ICD-10-CM

## 2019-02-12 DIAGNOSIS — M54.16 LUMBAR RADICULOPATHY: ICD-10-CM

## 2019-02-12 LAB
ALBUMIN UR-MCNC: NEGATIVE MG/DL
ANION GAP SERPL CALCULATED.3IONS-SCNC: 6 MMOL/L (ref 3–14)
APPEARANCE UR: ABNORMAL
BASOPHILS # BLD AUTO: 0 10E9/L (ref 0–0.2)
BASOPHILS NFR BLD AUTO: 0.3 %
BILIRUB UR QL STRIP: NEGATIVE
BUN SERPL-MCNC: 16 MG/DL (ref 7–30)
CALCIUM SERPL-MCNC: 8.8 MG/DL (ref 8.5–10.1)
CHLORIDE SERPL-SCNC: 105 MMOL/L (ref 94–109)
CO2 SERPL-SCNC: 28 MMOL/L (ref 20–32)
COLOR UR AUTO: YELLOW
CREAT SERPL-MCNC: 0.76 MG/DL (ref 0.52–1.04)
DIFFERENTIAL METHOD BLD: NORMAL
EOSINOPHIL # BLD AUTO: 0.1 10E9/L (ref 0–0.7)
EOSINOPHIL NFR BLD AUTO: 1.3 %
ERYTHROCYTE [DISTWIDTH] IN BLOOD BY AUTOMATED COUNT: 14.2 % (ref 10–15)
GFR SERPL CREATININE-BSD FRML MDRD: 72 ML/MIN/{1.73_M2}
GLUCOSE SERPL-MCNC: 87 MG/DL (ref 70–99)
GLUCOSE UR STRIP-MCNC: NEGATIVE MG/DL
HCT VFR BLD AUTO: 42.6 % (ref 35–47)
HGB BLD-MCNC: 13.7 G/DL (ref 11.7–15.7)
HGB UR QL STRIP: NEGATIVE
KETONES UR STRIP-MCNC: NEGATIVE MG/DL
LEUKOCYTE ESTERASE UR QL STRIP: ABNORMAL
LYMPHOCYTES # BLD AUTO: 1.7 10E9/L (ref 0.8–5.3)
LYMPHOCYTES NFR BLD AUTO: 24.9 %
MCH RBC QN AUTO: 30.6 PG (ref 26.5–33)
MCHC RBC AUTO-ENTMCNC: 32.2 G/DL (ref 31.5–36.5)
MCV RBC AUTO: 95 FL (ref 78–100)
MONOCYTES # BLD AUTO: 0.8 10E9/L (ref 0–1.3)
MONOCYTES NFR BLD AUTO: 12.4 %
NEUTROPHILS # BLD AUTO: 4.1 10E9/L (ref 1.6–8.3)
NEUTROPHILS NFR BLD AUTO: 61.1 %
NITRATE UR QL: NEGATIVE
NON-SQ EPI CELLS #/AREA URNS LPF: ABNORMAL /LPF
PH UR STRIP: 5.5 PH (ref 5–7)
PLATELET # BLD AUTO: 274 10E9/L (ref 150–450)
POTASSIUM SERPL-SCNC: 4.1 MMOL/L (ref 3.4–5.3)
RBC # BLD AUTO: 4.48 10E12/L (ref 3.8–5.2)
RBC #/AREA URNS AUTO: ABNORMAL /HPF
SODIUM SERPL-SCNC: 139 MMOL/L (ref 133–144)
SOURCE: ABNORMAL
SP GR UR STRIP: 1.01 (ref 1–1.03)
UROBILINOGEN UR STRIP-ACNC: 0.2 EU/DL (ref 0.2–1)
WBC # BLD AUTO: 6.7 10E9/L (ref 4–11)
WBC #/AREA URNS AUTO: ABNORMAL /HPF

## 2019-02-12 PROCEDURE — 85025 COMPLETE CBC W/AUTO DIFF WBC: CPT | Performed by: FAMILY MEDICINE

## 2019-02-12 PROCEDURE — 80048 BASIC METABOLIC PNL TOTAL CA: CPT | Performed by: FAMILY MEDICINE

## 2019-02-12 PROCEDURE — 81001 URINALYSIS AUTO W/SCOPE: CPT | Performed by: FAMILY MEDICINE

## 2019-02-12 PROCEDURE — 87088 URINE BACTERIA CULTURE: CPT | Performed by: FAMILY MEDICINE

## 2019-02-12 PROCEDURE — 87086 URINE CULTURE/COLONY COUNT: CPT | Performed by: FAMILY MEDICINE

## 2019-02-12 PROCEDURE — 99214 OFFICE O/P EST MOD 30 MIN: CPT | Performed by: FAMILY MEDICINE

## 2019-02-12 PROCEDURE — 36415 COLL VENOUS BLD VENIPUNCTURE: CPT | Performed by: FAMILY MEDICINE

## 2019-02-12 PROCEDURE — 87186 SC STD MICRODIL/AGAR DIL: CPT | Performed by: FAMILY MEDICINE

## 2019-02-12 ASSESSMENT — MIFFLIN-ST. JEOR: SCORE: 1129.93

## 2019-02-12 NOTE — Clinical Note
[phone] with labs, EKG, and xray reports if done.Jocelin Guajardo MD

## 2019-02-12 NOTE — PATIENT INSTRUCTIONS
Before Your Surgery      Call your surgeon if there is any change in your health. This includes signs of a cold or flu (such as a sore throat, runny nose, cough, rash or fever).    Do not smoke, drink alcohol or take over the counter medicine (unless your surgeon or primary care doctor tells you to) for the 24 hours before and after surgery.    If you take prescribed drugs: Follow your doctor s orders about which medicines to take and which to stop until after surgery.    Eating and drinking prior to surgery: follow the instructions from your surgeon    Take a shower or bath the night before surgery. Use the soap your surgeon gave you to gently clean your skin. If you do not have soap from your surgeon, use your regular soap. Do not shave or scrub the surgery site.  Wear clean pajamas and have clean sheets on your bed.     It is recommended that you get a vaccination for shingles called Shingrix (given as 2 shots, 2 to 6 months apart), even if you have already had the Zostavax vaccine. Discuss getting the Shingix vaccine from your pharmacist, or schedule an ancillary shot visit here. Some insurances do not cover the cost of these vaccines.

## 2019-02-12 NOTE — PROGRESS NOTES
[facility]  [address] [place] [address]  [place] [address]  [phone]  Dept: [phone]    PRE-OP EVALUATION:  Today's date: 2019    Brenna Watkins (: 1935) presents for pre-operative evaluation assessment as requested by Dr. Mckee.  She requires evaluation and anesthesia risk assessment prior to undergoing surgery/procedure for treatment of Back pain with radiculopathy.    Proposed Surgery/ Procedure: thoracic and lumbar fusion  Date of Surgery/ Procedure: 19  Time of Surgery/ Procedure:    Hospital/Surgical Facility: [facility]  Fax number for surgical facility: [phone]  Primary Physician: Jocelin Guajardo  Type of Anesthesia Anticipated: General    Patient has a Health Care Directive or Living Will:  YES HCD    1. NO - Do you have a history of heart attack, stroke, stent, bypass or surgery on an artery in the head, neck, heart or legs?  2. NO - Do you ever have any pain or discomfort in your chest?  3. NO - Do you have a history of  Heart Failure?  4. YES - ARE YOUR TROUBLED BY SHORTNESS OF BREATH WHEN WALKING ON THE LEVEL, UP A SLIGHT HILL OR AT NIGHT?    5. NO - Do you currently have a cold, bronchitis or other respiratory infection?  6. NO - Do you have a cough, shortness of breath or wheezing?  7. YES - DO YOU SOMETIMES GET PAINS IN THE CALVES OF YOUR LEGS WHEN YOU WALK?    8. NO - Do you or anyone in your family have previous history of blood clots?  9. NO - Do you or does anyone in your family have a serious bleeding problem such as prolonged bleeding following surgeries or cuts?  10. NO - Have you ever had problems with anemia or been told to take iron pills?  11. NO - Have you had any abnormal blood loss such as black, tarry or bloody stools, or abnormal vaginal bleeding?  12. NO - Have you ever had a blood transfusion?  13. NO - Have you or any of your relatives ever had problems with anesthesia?  14. NO - Do you have sleep apnea, excessive snoring or daytime  drowsiness?  15. NO - Do you have any prosthetic heart valves?  16. NO - Do you have prosthetic joints?  17. NO - Is there any chance that you may be pregnant?    HPI:   Brenna Watkins is a 84 year old female who presents with low blood pressure with radiculopathy. Symptom onset has been unchanged for a time period of 4 years despite prior back surgery. Severity is described as moderate. Course of her symptoms over time is unchanged.    See problem list for active medical problems.  Problems all longstanding and stable, except as noted/documented.  See ROS for pertinent symptoms related to these conditions.                                                                                                                                                          .    MEDICAL HISTORY:     Patient Active Problem List    Diagnosis Date Noted     Lumbar radiculopathy 01/16/2019     Priority: Medium     Hypertension goal BP (blood pressure) < 140/90 07/05/2017     Priority: Medium     CTS (carpal tunnel syndrome)      Priority: Medium     GERD without esophagitis      Priority: Low     Osteopenia      Priority: Low     CARDIOVASCULAR SCREENING; LDL GOAL LESS THAN 160 06/13/2017     Priority: Low     Advanced directives, counseling/discussion 06/13/2017     Priority: Low      Past Medical History:   Diagnosis Date     Brain aneurysm      Bunion      CTS (carpal tunnel syndrome)      GERD without esophagitis      Hypertension goal BP (blood pressure) < 150/90 7/5/2017     Osteopenia      Past Surgical History:   Procedure Laterality Date     APPENDECTOMY       AS REVISE ULNAR NERVE AT ELBOW  2018     BACK SURGERY  2015     CARPAL TUNNEL RELEASE RT/LT Right      CRANIOTOMY, REPAIR ANEURYSM, COMBINED       STRIP VEIN       TOE SURGERY  03/2018     Current Outpatient Medications   Medication Sig Dispense Refill     Omeprazole (PRILOSEC PO) Take by mouth daily       OTC products: None, except as noted above    Allergies   Allergen  Reactions     Penicillins       Latex Allergy: NO    Social History     Tobacco Use     Smoking status: Never Smoker     Smokeless tobacco: Never Used   Substance Use Topics     Alcohol use: Yes     Alcohol/week: 0.6 - 1.2 oz     Types: 1 - 2 Glasses of wine per week     History   Drug Use No     Social History     Socioeconomic History     Marital status:      Spouse name: Rishabh      Number of children: 4     Years of education: 12     Highest education level: Not on file   Social Needs     Financial resource strain: Not on file     Food insecurity - worry: Not on file     Food insecurity - inability: Not on file     Transportation needs - medical: Not on file     Transportation needs - non-medical: Not on file   Occupational History     Occupation: [employer]      Employer: RETIRED   Tobacco Use     Smoking status: Never Smoker     Smokeless tobacco: Never Used   Substance and Sexual Activity     Alcohol use: Yes     Alcohol/week: 0.6 - 1.2 oz     Types: 1 - 2 Glasses of wine per week     Drug use: No     Sexual activity: Not Currently     Partners: Male     Birth control/protection: Post-menopausal   Other Topics Concern     Parent/sibling w/ CABG, MI or angioplasty before 65F 55M? No   Social History Narrative     Not on file     Family History   Problem Relation Age of Onset     Sudden Death Mother         d. childbirth      Alcoholism Father 62     Myocardial Infarction Brother 68     Alzheimer Disease Sister      Diabetes No family hx of      Cancer No family hx of       REVIEW OF SYSTEMS:   CONSTITUTIONAL: NEGATIVE for fever, chills, change in weight  INTEGUMENTARY/SKIN: NEGATIVE for worrisome rashes, moles or lesions  EYES: NEGATIVE for vision changes or irritation  ENT/MOUTH: NEGATIVE for ear, mouth and throat problems  RESP: dyspnea on exertion  CV: NEGATIVE for chest pain, palpitations or peripheral edema  GI: NEGATIVE for nausea, abdominal pain, heartburn, or change in bowel  "habits  : NEGATIVE for frequency, dysuria, or hematuria  MUSCULOSKELETAL: back pain and radicular pain    NEURO: dysesthesias and weakness    ENDOCRINE: NEGATIVE for temperature intolerance, skin/hair changes  HEME: NEGATIVE for bleeding problems  PSYCHIATRIC: NEGATIVE for changes in mood or affect  Complete ROS otherwise negative.     EXAM:   /76   Pulse 97   Temp 97  F (36.1  C) (Oral)   Resp 20   Ht 1.575 m (5' 2\")   Wt 71.7 kg (158 lb)   SpO2 95%   Breastfeeding? No   BMI 28.90 kg/m      GENERAL APPEARANCE: healthy, alert and no distress     EYES: EOMI, PERRL     HENT: ear canals and TM's normal and nose and mouth without ulcers or lesions     NECK: no adenopathy, no asymmetry, masses, or scars and thyroid normal to palpation     RESP: lungs clear to auscultation - no rales, rhonchi or wheezes     CV: regular rates and rhythm, normal S1 S2, no S3 or S4 and no murmur, click or rub     ABDOMEN:  soft, nontender, no HSM or masses and bowel sounds normal     MS: extremities normal- no gross deformities noted, no evidence of inflammation in joints, FROM in all extremities.     SKIN: no suspicious lesions or rashes     NEURO: Normal strength and tone, sensory exam grossly normal, mentation intact and speech normal     PSYCH: mentation appears normal. and affect normal/bright     LYMPHATICS: No cervical adenopathy    DIAGNOSTICS:   EKG: 10/28/18   Sinus  Rhythm   WITHIN NORMAL LIMITS  No change from comparison    Recent Labs   Lab Test 10/29/18  1047 03/21/18  1220 07/10/17  1522   HGB 14.0 13.9 13.3   PLT  --   --  246   NA  --  139  --    POTASSIUM  --  4.2  --    CR  --  0.80  --         IMPRESSION:   Reason for surgery/procedure: hypertension   Diagnosis/reason for consult: lumbar radiculopathy     The proposed surgical procedure is considered INTERMEDIATE risk.    REVISED CARDIAC RISK INDEX  The patient has the following serious cardiovascular risks for perioperative complications such as (MI, PE, " VFib and 3  AV Block):  No serious cardiac risks  INTERPRETATION: 0 risks: Class I (very low risk - 0.4% complication rate)    The patient has the following additional risks for perioperative complications:  No identified additional risks      ICD-10-CM    1. Preop general physical exam Z01.818 Basic metabolic panel     UA reflex to Microscopic and Culture     CBC with platelets differential     CANCELED: Hemoglobin   2. Lumbar radiculopathy M54.16 Basic metabolic panel   3. Hypertension goal BP (blood pressure) < 140/90 I10 Basic metabolic panel     UA reflex to Microscopic and Culture       RECOMMENDATIONS:   --Patient is to take all scheduled medications on the day of surgery EXCEPT for modifications listed below.    APPROVAL GIVEN to proceed with proposed procedure, without further diagnostic evaluation       Signed Electronically by: Jocelin Guajardo MD    Copy of this evaluation report is provided to requesting physician.    [facility] Preop Guidelines    Revised Cardiac Risk Index

## 2019-02-14 LAB
BACTERIA SPEC CULT: ABNORMAL
BACTERIA SPEC CULT: ABNORMAL
SPECIMEN SOURCE: ABNORMAL

## 2019-02-14 RX ORDER — SULFAMETHOXAZOLE/TRIMETHOPRIM 800-160 MG
1 TABLET ORAL 2 TIMES DAILY
Qty: 6 TABLET | Refills: 0 | Status: SHIPPED | OUTPATIENT
Start: 2019-02-14 | End: 2019-02-17

## 2019-02-14 NOTE — RESULT ENCOUNTER NOTE
Please call patient:    Your blood counts, blood glucose and kidney tests are normal.     Your urine culture grew bacteria. Please take Bactrim twice daily for 3 days prior to your surgery.    Jocelin Guajardo MD

## 2019-10-29 ENCOUNTER — OFFICE VISIT (OUTPATIENT)
Dept: OTOLARYNGOLOGY | Facility: CLINIC | Age: 84
End: 2019-10-29
Payer: COMMERCIAL

## 2019-10-29 VITALS
WEIGHT: 158 LBS | HEIGHT: 62 IN | DIASTOLIC BLOOD PRESSURE: 70 MMHG | BODY MASS INDEX: 29.08 KG/M2 | HEART RATE: 83 BPM | OXYGEN SATURATION: 99 % | SYSTOLIC BLOOD PRESSURE: 143 MMHG

## 2019-10-29 DIAGNOSIS — H61.23 BILATERAL IMPACTED CERUMEN: ICD-10-CM

## 2019-10-29 DIAGNOSIS — H93.13 TINNITUS, BILATERAL: Primary | ICD-10-CM

## 2019-10-29 PROCEDURE — 99213 OFFICE O/P EST LOW 20 MIN: CPT | Mod: 25 | Performed by: OTOLARYNGOLOGY

## 2019-10-29 PROCEDURE — 69210 REMOVE IMPACTED EAR WAX UNI: CPT | Performed by: OTOLARYNGOLOGY

## 2019-10-29 ASSESSMENT — MIFFLIN-ST. JEOR: SCORE: 1129.93

## 2019-10-29 NOTE — PATIENT INSTRUCTIONS
General Scheduling Information  To schedule your CT/MRI scan, please contact [place] [facility] at [phone] [address] [place] [address]    To schedule your Surgery, please contact our Specialty Schedulers at [phone]    ENT Clinic Locations Clinic Hours Telephone Number     [facility] [place]  [address]  [place] [address]   Tuesday:       8:00am -- 4:00pm    Wednesday:  8:00am - 4:00pm   To schedule an appointment with   Dr. Soni,   please contact our   Specialty Scheduling Department at:     [phone]       [facility]  [address] [place] [address]   Friday:          8:00am - 4:00pm         Urgent Care Locations Clinic Hours Telephone Numbers     [facility]  [address] [place] [address]     Monday-Friday:     11:00pm - 9:00pm    Saturday-Sunday:  9:00am - 5:00pm   [phone]     [facility]  [address] [place] [address]     Monday-Friday:      5:00pm - 9:00pm     Saturday-Sunday:  9:00am - 5:00pm   [phone]     Brenna to follow up with Primary Care provider regarding elevated blood pressure.

## 2019-10-29 NOTE — LETTER
10/29/2019         RE: Brenna Watkins  [address]  [place] [address]        Dear Colleague,    Thank you for referring your patient, Brenna Watkins, to the [facility] ER. Please see a copy of my visit note below.    Chief Complaint - tinnitus, plugged ears    History of Present Illness - Brenna Watkins is a 84 year old female who presents to me today for plugging of both ears. Left is worse. She feels ears close up. No hearing aid usage. She has tinnitus, worse lately. They have noted symptoms for approximately years, but worsening. She feels she hears well enough without hearing aids. The patient is concerned about possible ear wax causing a plugged ear. They have had their ears cleaned out before as she had an otitis externa in 2017. The patient denies otorrhea, otalgia. No history of ear surgery or chronic ear disease.     Past Medical History -   Patient Active Problem List   Diagnosis     CARDIOVASCULAR SCREENING; LDL GOAL LESS THAN 160     Advanced directives, counseling/discussion     CTS (carpal tunnel syndrome)     Hypertension goal BP (blood pressure) < 140/90     Osteopenia     GERD without esophagitis     Lumbar radiculopathy       Current Medications -   Current Outpatient Medications:      Omeprazole (PRILOSEC PO), Take by mouth daily, Disp: , Rfl:     Allergies -   Allergies   Allergen Reactions     Penicillins        Social History -   Social History     Socioeconomic History     Marital status:      Spouse name: Rishabh      Number of children: 4     Years of education: 12     Highest education level: None   Occupational History     Occupation: , [employer]      Employer: RETIRED   Social Needs     Financial resource strain: None     Food insecurity:     Worry: None     Inability: None     Transportation needs:     Medical: None     Non-medical: None   Tobacco Use     Smoking status: Never Smoker     Smokeless tobacco: Never Used   Substance and Sexual  "Activity     Alcohol use: Yes     Alcohol/week: 1.0 - 2.0 standard drinks     Types: 1 - 2 Glasses of wine per week     Drug use: No     Sexual activity: Not Currently     Partners: Male     Birth control/protection: Post-menopausal   Lifestyle     Physical activity:     Days per week: None     Minutes per session: None     Stress: None   Relationships     Social connections:     Talks on phone: None     Gets together: None     Attends Lutheran service: None     Active member of club or organization: None     Attends meetings of clubs or organizations: None     Relationship status: None     Intimate partner violence:     Fear of current or ex partner: None     Emotionally abused: None     Physically abused: None     Forced sexual activity: None   Other Topics Concern     Parent/sibling w/ CABG, MI or angioplasty before 65F 55M? No   Social History Narrative     None       Family History -   Family History   Problem Relation Age of Onset     Sudden Death Mother         d. childbirth      Alcoholism Father 62     Myocardial Infarction Brother 68     Alzheimer Disease Sister      Diabetes No family hx of      Cancer No family hx of        Review of Systems - As per HPI and PMHx, otherwise 7 system review of the head and neck negative.    Physical Exam  BP (!) 143/70   Pulse 83   Ht 1.575 m (5' 2\")   Wt 71.7 kg (158 lb)   SpO2 99%   BMI 28.90 kg/m     General - The patient is in no distress.  Alert and oriented to person and place, answers questions and cooperates with examination appropriately.   Voice and Breathing - The patient was breathing comfortably without the use of accessory muscles. There was no wheezing, stridor, or stertor.  The patients voice was clear and strong.  Ears - The auricles are normal in appearance, no erythema. Both ears were impacted with cerumen. See below for the procedure. Once the cerumen was removed the tympanic membranes are normal in appearance, bony landmarks are intact.  No " retraction, perforation, or masses.  No fluid or purulence was seen in the external canal or the middle ear.   Eyes - Extraocular movements intact. Sclera were not icteric or injected.  Mouth - Examination of the oral cavity showed pink, healthy mucosa. No lesions or ulcerations noted.  The tongue was mobile and midline.  Neck - Palpation of the occipital, submental, submandibular, internal jugular chain, and supraclavicular nodes did not demonstrate any abnormal lymph nodes or masses. Parotid glands were without masses.  The trachea was mobile and midline.  Neurological - Cranial nerves 2 through 12 were grossly intact. House-Brackmann grade 1 out of 6 bilaterally.     Cerumen Removal    Physical Exam and Procedure  Ears - On examination of the ears, I found that both ears were impacted with cerumen.  Therefore, I positioned the patient in the examination chair in a semi-supine position. I used the binocular surgical microscope to perform cerumen removal.  On the right side, I began by using a cerumen loop to gently lift the edges of the cerumen mass away from the walls of the external canal.  Once I did this, I was able to pull away fragments of wax and debris.  I removed all the wax and debri. The tympanic membrane was intact, no sign of perforation or middle ear effusion.    I turned my attention to the left side once again using the binocular surgical microscope to perform cerumen removal.  I began by using a cerumen loop to gently lift the edges of the cerumen mass away from the walls of the external canal.  Once I did this, I was able to pull away fragments of wax and debris. I removed all wax and debri. The tympanic membrane was intact, no sign of perforation or middle ear effusion.      Assessment and Plan - Brenna Watkins is a 84 year old female who presents to me today with tinnitus, likely due to sensorineural hearing loss. She also had cerumen impaction, and this was removed. I recommended an audiogram,  but she doesn't want to do this. She feels her hearing is okay, and doesn't want to pursue a hearing aid. Return prn or if hearing loss or tinnitus worsen.      Jonathan Soni MD  Otolaryngology  [facility]      Again, thank you for allowing me to participate in the care of your patient.        Sincerely,        Jonathan Soni MD

## 2019-10-29 NOTE — PROGRESS NOTES
Chief Complaint - tinnitus, plugged ears    History of Present Illness - Brenna Watkins is a 84 year old female who presents to me today for plugging of both ears. Left is worse. She feels ears close up. No hearing aid usage. She has tinnitus, worse lately. They have noted symptoms for approximately years, but worsening. She feels she hears well enough without hearing aids. The patient is concerned about possible ear wax causing a plugged ear. They have had their ears cleaned out before as she had an otitis externa in 2017. The patient denies otorrhea, otalgia. No history of ear surgery or chronic ear disease.     Past Medical History -   Patient Active Problem List   Diagnosis     CARDIOVASCULAR SCREENING; LDL GOAL LESS THAN 160     Advanced directives, counseling/discussion     CTS (carpal tunnel syndrome)     Hypertension goal BP (blood pressure) < 140/90     Osteopenia     GERD without esophagitis     Lumbar radiculopathy       Current Medications -   Current Outpatient Medications:      Omeprazole (PRILOSEC PO), Take by mouth daily, Disp: , Rfl:     Allergies -   Allergies   Allergen Reactions     Penicillins        Social History -   Social History     Socioeconomic History     Marital status:      Spouse name: Rishabh      Number of children: 4     Years of education: 12     Highest education level: None   Occupational History     Occupation: , [employer] and [employer]      Employer: RETIRED   Social Needs     Financial resource strain: None     Food insecurity:     Worry: None     Inability: None     Transportation needs:     Medical: None     Non-medical: None   Tobacco Use     Smoking status: Never Smoker     Smokeless tobacco: Never Used   Substance and Sexual Activity     Alcohol use: Yes     Alcohol/week: 1.0 - 2.0 standard drinks     Types: 1 - 2 Glasses of wine per week     Drug use: No     Sexual activity: Not Currently     Partners: Male     Birth control/protection: Post-menopausal   Lifestyle      "Physical activity:     Days per week: None     Minutes per session: None     Stress: None   Relationships     Social connections:     Talks on phone: None     Gets together: None     Attends Gnosticist service: None     Active member of club or organization: None     Attends meetings of clubs or organizations: None     Relationship status: None     Intimate partner violence:     Fear of current or ex partner: None     Emotionally abused: None     Physically abused: None     Forced sexual activity: None   Other Topics Concern     Parent/sibling w/ CABG, MI or angioplasty before 65F 55M? No   Social History Narrative     None       Family History -   Family History   Problem Relation Age of Onset     Sudden Death Mother         d. childbirth      Alcoholism Father 62     Myocardial Infarction Brother 68     Alzheimer Disease Sister      Diabetes No family hx of      Cancer No family hx of        Review of Systems - As per HPI and PMHx, otherwise 7 system review of the head and neck negative.    Physical Exam  BP (!) 143/70   Pulse 83   Ht 1.575 m (5' 2\")   Wt 71.7 kg (158 lb)   SpO2 99%   BMI 28.90 kg/m    General - The patient is in no distress.  Alert and oriented to person and place, answers questions and cooperates with examination appropriately.   Voice and Breathing - The patient was breathing comfortably without the use of accessory muscles. There was no wheezing, stridor, or stertor.  The patients voice was clear and strong.  Ears - The auricles are normal in appearance, no erythema. Both ears were impacted with cerumen. See below for the procedure. Once the cerumen was removed the tympanic membranes are normal in appearance, bony landmarks are intact.  No retraction, perforation, or masses.  No fluid or purulence was seen in the external canal or the middle ear.   Eyes - Extraocular movements intact. Sclera were not icteric or injected.  Mouth - Examination of the oral cavity showed pink, healthy mucosa. " No lesions or ulcerations noted.  The tongue was mobile and midline.  Neck - Palpation of the occipital, submental, submandibular, internal jugular chain, and supraclavicular nodes did not demonstrate any abnormal lymph nodes or masses. Parotid glands were without masses.  The trachea was mobile and midline.  Neurological - Cranial nerves 2 through 12 were grossly intact. House-Brackmann grade 1 out of 6 bilaterally.     Cerumen Removal    Physical Exam and Procedure  Ears - On examination of the ears, I found that both ears were impacted with cerumen.  Therefore, I positioned the patient in the examination chair in a semi-supine position. I used the binocular surgical microscope to perform cerumen removal.  On the right side, I began by using a cerumen loop to gently lift the edges of the cerumen mass away from the walls of the external canal.  Once I did this, I was able to pull away fragments of wax and debris.  I removed all the wax and debri. The tympanic membrane was intact, no sign of perforation or middle ear effusion.    I turned my attention to the left side once again using the binocular surgical microscope to perform cerumen removal.  I began by using a cerumen loop to gently lift the edges of the cerumen mass away from the walls of the external canal.  Once I did this, I was able to pull away fragments of wax and debris. I removed all wax and debri. The tympanic membrane was intact, no sign of perforation or middle ear effusion.      Assessment and Plan - Brenna Watkins is a 84 year old female who presents to me today with tinnitus, likely due to sensorineural hearing loss. She also had cerumen impaction, and this was removed. I recommended an audiogram, but she doesn't want to do this. She feels her hearing is okay, and doesn't want to pursue a hearing aid. Return prn or if hearing loss or tinnitus worsen.      Jonathan Soni MD  Otolaryngology  [facility]

## 2020-01-28 ENCOUNTER — TELEPHONE (OUTPATIENT)
Dept: FAMILY MEDICINE | Facility: CLINIC | Age: 85
End: 2020-01-28

## 2020-01-28 NOTE — LETTER
January 28, 2020          Brenna Watkins,  [address]  [place] [address]        Dear Brenna Watkins      Monitoring and managing your preventative and chronic health conditions are very important to us. Our records indicate that you have not scheduled for Annual physical exam,Blood pressure check  which was recommended by Dr. Guajardo      If you have received your health care elsewhere, please call the clinic so the information can be documented in your chart.    Please call [phone] or message us through your "[employer]" account to schedule an appointment or provide information for your chart.     Feel free to contact us if you have any questions or concerns!    I look forward to seeing you and working with you on your health care needs.     Sincerely,       Your [facility] Team/stephanie

## 2020-01-28 NOTE — TELEPHONE ENCOUNTER
Panel Management Review      Patient has the following on her problem list:     Hypertension   Last three blood pressure readings:  BP Readings from Last 3 Encounters:   10/29/19 (!) 143/70   02/12/19 136/76   10/29/18 150/78     Blood pressure: FAILED    HTN Guidelines:  Less than 140/90      Composite cancer screening  Chart review shows that this patient is due/due soon for the following None  Summary:    Patient is due/failing the following:   BP CHECK and PHYSICAL    Action needed:   Patient needs office visit for Wellness exam, Blood pressure check.    Type of outreach:    Sent letter.    Questions for provider review:    None                                                                                                                                    Mary Lou TATE MA

## 2020-02-17 ENCOUNTER — TELEPHONE (OUTPATIENT)
Dept: FAMILY MEDICINE | Facility: CLINIC | Age: 85
End: 2020-02-17

## 2020-02-17 NOTE — TELEPHONE ENCOUNTER
Reason for call:  Other   Patient called regarding (reason for call): SHAGUFTA     Additional comments: Patient calling stating if she received a letter asking if she was still with this clinic, she states she is but there just hasn't been anything wrong with her. States she'll make an appointment when needed.     Phone number to reach patient:  N/a   Best Time:  N/a     Can we leave a detailed message on this number?  Not Applicable    Travel screening: Not Applicable

## 2020-04-13 ENCOUNTER — NURSE TRIAGE (OUTPATIENT)
Dept: FAMILY MEDICINE | Facility: CLINIC | Age: 85
End: 2020-04-13

## 2020-04-13 DIAGNOSIS — M25.552 HIP PAIN, LEFT: Primary | ICD-10-CM

## 2020-04-13 NOTE — TELEPHONE ENCOUNTER
Called patient and notified her of provider reply as written.   Pt states that she can't wait till July. She is wondering if referral can be given to Dr. Gillespie at [facility]. Referral placed to orthopedics/[facility], Dr. Gillespie. Pt will call [facility] to see if she can get an appt scheduled. If she has further questions, she will call us back.

## 2020-04-13 NOTE — TELEPHONE ENCOUNTER
I don't do injections and I can't guarantee that one of our Sports Medicine orthopedic surgeons would do this now or defer to July. Further evaluation is needed for diagnosis. I can refer to orthopedic surgery.

## 2020-04-13 NOTE — TELEPHONE ENCOUNTER
"Called patient (see initial assessment questions below for symptoms/triage).  Pt had an MRI done at [facility] with Dr. Mckee who did her back surgery. He told her that he does not think that her hip pain is related to her back or initiating from her back.  Writer explained that disposition recommendation would be OV, will send to PCP to ask if a telephone visit would be appropriate or if face to face needed.  Pt does not have a smart phone and does not have access to a computer for video visit.   Pt states that she just wants a cortisone shot. Asked pt if she has gotten them in the past. She states that she thinks so. She has gone to [place], Dr. Yang  Hip, knee, foot specialist with [facility]. His office is not open, she has called him already.   Please advise.      Answer Assessment - Initial Assessment Questions  1. LOCATION and RADIATION: \"Where is the pain located?\"       It goes from her waist down entire leg to foot on the left side   2. QUALITY: \"What does the pain feel like?\"  (e.g., sharp, dull, aching, burning)      At first it was just minor, now excruciating. It burns and aches. Hurts to sit, stand or sleep  3. SEVERITY: \"How bad is the pain?\" \"What does it keep you from doing?\"   (Scale 1-10; or mild, moderate, severe)    -  MILD (1-3): doesn't interfere with normal activities     -  MODERATE (4-7): interferes with normal activities (e.g., work or school) or awakens from sleep, limping     -  SEVERE (8-10): excruciating pain, unable to do any normal activities, unable to walk      10/10  4. ONSET: \"When did the pain start?\" \"Does it come and go, or is it there all the time?\"      It first began about 3 weeks ago. She was given an epidural which is what the doctor thought would help. The pain is constant, at first it wasn't  5. WORK OR EXERCISE: \"Has there been any recent work or exercise that involved this part of the body?\"       No  6. CAUSE: \"What do you think is causing the hip " "pain?\"       Pt states that she spoke with a doctor regarding results of MRI and was told that it is not her back  7. AGGRAVATING FACTORS: \"What makes the hip pain worse?\" (e.g., walking, climbing stairs, running)      Pt states that it is just there all the time  8. OTHER SYMPTOMS: \"Do you have any other symptoms?\" (e.g., back pain, pain shooting down leg,  fever, rash)      Pt states that she does not have a fever or rash. At first she would get spasms. Pt states that there was shooting pain initially, but now just constant    Protocols used: HIP PAIN-A-OH    "

## 2020-04-13 NOTE — TELEPHONE ENCOUNTER
Reason for call:  Other   Patient called regarding (reason for call): call back  Additional comments: Patient is calling because she has left hip pain. Please call back to discuss.    Phone number to reach patient:  Home number on file [phone] (home)    Best Time:  any    Can we leave a detailed message on this number?  YES    Travel screening: Negative

## 2020-04-20 ENCOUNTER — TRANSFERRED RECORDS (OUTPATIENT)
Dept: HEALTH INFORMATION MANAGEMENT | Facility: CLINIC | Age: 85
End: 2020-04-20

## 2020-09-21 ENCOUNTER — TELEPHONE (OUTPATIENT)
Dept: FAMILY MEDICINE | Facility: CLINIC | Age: 85
End: 2020-09-21

## 2020-09-21 NOTE — TELEPHONE ENCOUNTER
Spoke to patient and she has an appointment with Neha Mcmillan 9/22/2020. She is hoping to get biopsy done.  Lizzy RANGEL CMA ([facility])

## 2020-09-21 NOTE — TELEPHONE ENCOUNTER
Patient has a mole or skin tag on neck. Patient accidently pulled part of the skin tag off with her scrubby. She was seen in an urgent care in [place].  They put her on Clindamycin 300 mg 4 times a day for 5 days as he thinks there is an infection. He thought it may need a biopsy done. She has an appt on 9/30 with Dr Guajardo. Is it ok to wait? It is very sore. Ok to leave a detailed message.

## 2020-09-21 NOTE — TELEPHONE ENCOUNTER
"Spoke with pt. No fever or chills. Just has pain on the neck when her shirt touches it. Was told to keep a bandaid over it. No drainage. Mole area is red. Redness is not spreading. Area is the size of a quarter and is the size of a knob where she tore it off.     Per  note:  \"She does have suggestions of inflammation and infection so I started her on some clindamycin. I recommended she contact her clinic tomorrow to see if they can move up her appointment. If not, primary care or dermatology at this facility may be able to evaluate her sooner. Alternately general surgery or plastic surgery could evaluate her. I asked her not to tug on this or remove as this may require biopsy and pathology review. Patient agreeable to the plan.\"      No openings with PCP sooner than 9/30. Please advise.      Lizzy Smallwood RN  [facility] [place]    "

## 2020-09-22 ENCOUNTER — OFFICE VISIT (OUTPATIENT)
Dept: FAMILY MEDICINE | Facility: CLINIC | Age: 85
End: 2020-09-22
Payer: COMMERCIAL

## 2020-09-22 VITALS
TEMPERATURE: 98.4 F | RESPIRATION RATE: 16 BRPM | SYSTOLIC BLOOD PRESSURE: 155 MMHG | HEART RATE: 95 BPM | OXYGEN SATURATION: 97 % | DIASTOLIC BLOOD PRESSURE: 87 MMHG | WEIGHT: 147.6 LBS | BODY MASS INDEX: 27 KG/M2

## 2020-09-22 DIAGNOSIS — L85.8 CUTANEOUS HORN: Primary | ICD-10-CM

## 2020-09-22 PROCEDURE — 88305 TISSUE EXAM BY PATHOLOGIST: CPT | Performed by: NURSE PRACTITIONER

## 2020-09-22 PROCEDURE — 88305 TISSUE EXAM BY PATHOLOGIST: CPT | Mod: 26 | Performed by: NURSE PRACTITIONER

## 2020-09-22 PROCEDURE — 11102 TANGNTL BX SKIN SINGLE LES: CPT | Performed by: NURSE PRACTITIONER

## 2020-09-22 NOTE — PATIENT INSTRUCTIONS
Keep the shave biopsy area clean and dry. Remove the dressing in 3 days and then keep open to air, only using a new bandaid if you have drainage. Watch for signs of infection including redness, warmth, drainage, fever, chills, lightheadedness and seek medical attention if these occur. Follow up next Monday to have sutures removed.     Patient Education     Understanding Mole Excision  Moles are skin growths that are darker than the surrounding skin. They are common and are not normally a problem. But moles can sometimes cause problems. In certain cases, a type of skin cancer called melanoma can grow in or near the mole. In other cases, a mole may be bothersome. In either case, removal (excision) of a problem mole can be done.  Why mole excision is done  Your healthcare provider may do a mole excision for one or more reasons:    Part or all of a suspicious mole may be removed to check it for cancer.    A mole that is constantly rubbed by clothing or irritated in other ways may be removed to help make you more comfortable.    A mole that is large or on a visible body part can be removed for cosmetic reasons.  How mole excision is done  Removing a mole is often done in the healthcare provider s office. You usually go home the same day.    The area is cleaned. It is then injected with medicine (anesthetic) to numb it.    The provider cuts out the mole. He or she may also remove a certain amount of healthy tissue around the mole to make sure the margins are clear of any dangerous cells.    If needed, the incision may be closed with sutures or staples.  Risks of mole excision    Damage to nearby nerves    Infection of the incision    Keloid or too much scar tissue forms    Pain in the area    Recurrence of the mole    Scarring  Preventing skin cancer  To help protect yourself from skin cancer:    Check your skin regularly for changes in your moles and for new moles.    See your healthcare provider if you have a mole that  bleeds, itches, or changes in size, color, or shape.    If you have many moles or have a family history of skin cancer, have moles checked by your healthcare provider at least once a year.    Use clothing and sunscreen that is SPF 30 or higher to protect your skin from the sun.    Never use tanning beds.   Date Last Reviewed: 5/1/2016 2000-2019 [employer]. [address] [place] [address], [place] [address]. All rights reserved. This information is not intended as a substitute for professional medical care. Always follow your healthcare professional's instructions.

## 2020-09-22 NOTE — PROGRESS NOTES
Subjective     Brenna Watkins is a 85 year old female who presents to clinic today for the following health issues:    HPI       Chief Complaint   Patient presents with     Mole     mole removal on right side of neck     States that 3-4 weeks ago a mole right side of upper chest/neck ripped partially of while scrubbing in the shower. Is here for removal of remaining mole. Went to the urgent care for this 9/20 and started on Clindamycin.  Denies drainage, fever, chills.    Review of Systems   Constitutional, HEENT, cardiovascular, pulmonary, gi and gu systems are negative, except as otherwise noted.      Objective    BP (!) 155/87   Pulse 95   Temp 98.4  F (36.9  C) (Oral)   Resp 16   Wt 67 kg (147 lb 9.6 oz)   SpO2 97%   BMI 27.00 kg/m    Body mass index is 27 kg/m .  Physical Exam   GENERAL: healthy, alert and no distress  EYES: Eyes grossly normal to inspection, PERRL and conjunctivae and sclerae normal  SKIN: 7mm cutaneous horn partially torn form upper chest/neck area, area or erythema at base          Assessment & Plan     Cutaneous horn  - Surgical pathology exam  - DESTRUCT PREMALIGNANT LESION, FIRST  Patient signed consent for skin biopsy. Area cleaned with cloraprep and anesthetized with 1% lidocaine with epi . Then a #15 blade used to remove an area of her lesion and sent to pathology. Bleeding was cauterized. 2 sutures placed with 4-0 nylon. Bandaid placed. Patient tolerated procedure well. She should finish her course of clindamycin.     Return in about 6 days (around 9/28/2020) for RN visit for suture removal.    MARTY Guy [facility]

## 2020-09-25 LAB — COPATH REPORT: NORMAL

## 2020-09-28 ENCOUNTER — ALLIED HEALTH/NURSE VISIT (OUTPATIENT)
Dept: NURSING | Facility: CLINIC | Age: 85
End: 2020-09-28
Payer: COMMERCIAL

## 2020-09-28 DIAGNOSIS — L98.9 ABNORMAL SKIN MORPHOLOGY DETERMINED BY BIOPSY: ICD-10-CM

## 2020-09-28 DIAGNOSIS — Z48.02 VISIT FOR SUTURE REMOVAL: Primary | ICD-10-CM

## 2020-09-28 DIAGNOSIS — L98.9 SKIN LESION: ICD-10-CM

## 2020-09-28 DIAGNOSIS — L85.8 CUTANEOUS HORN: Primary | ICD-10-CM

## 2020-09-28 PROCEDURE — 99207 ZZC NO CHARGE NURSE ONLY: CPT

## 2020-09-28 NOTE — PROGRESS NOTES
Brenna MARIE Watkins presents to the clinic for removal of sutures. The patient has had sutures in place for 6 days. There has been no patient reported signs or symptoms of infection or drainage. 2  sutures are seen and located on the right side of upper chest under neck. Tetanus status is up to date. All sutures were easily removed today. Routine wound care discussed by the RN or provider. The patient will follow up as needed.    Jessenia Velasco RN

## 2020-11-03 ENCOUNTER — TELEPHONE (OUTPATIENT)
Dept: DERMATOLOGY | Facility: CLINIC | Age: 85
End: 2020-11-03

## 2020-11-03 NOTE — TELEPHONE ENCOUNTER
[facility] Call Center    Phone Message    May a detailed message be left on voicemail: yes     Reason for Call: Appointment Intake    Referring Provider Name: Neha Mcmillan APRN CNP in  FAMILY PRACTICE  Diagnosis and/or Symptoms: positive skin biopsy    Referral/records in chart for review.    Action Taken: Message routed to:  Adult Clinics: Dermatology p [id number]    Travel Screening: Not Applicable

## 2020-11-03 NOTE — TELEPHONE ENCOUNTER
Pt being referred by FP in [place] for a SCCIS Routing encounter to  to please review to advise on path results and  if pt should be seen in clinic for a consult or a virtual visit with photos prior to scheduling any procedures. Thank you.Ele Casanova RN

## 2020-11-05 NOTE — TELEPHONE ENCOUNTER
I spoke with Brenna and notified her of Dr. Freeman's recommendation.  I reviewed the diagnosis with her as she stated she wasn't aware that she had a skin cancer.    Telephone consult scheduled for 11/11/20.  [employer] link sent to her 's email at [email or web address].    I notified her that once she signs up for [employer] that I will be able to send her the photo instructions.    She verbalized understanding and agreed with the plan.  My call back # was provided if she had any questions.    Bia Ellison RN

## 2020-11-05 NOTE — TELEPHONE ENCOUNTER
I think a virtual consult with photos would be best. Depending on the size, we may recommend Mohs, if small enough, excision might be easier. Thank you.

## 2020-11-11 ENCOUNTER — VIRTUAL VISIT (OUTPATIENT)
Dept: DERMATOLOGY | Facility: CLINIC | Age: 85
End: 2020-11-11
Payer: COMMERCIAL

## 2020-11-11 DIAGNOSIS — D04.4 SQUAMOUS CELL CARCINOMA IN SITU (SCCIS) OF SKIN OF NECK: Primary | ICD-10-CM

## 2020-11-11 PROCEDURE — 99212 OFFICE O/P EST SF 10 MIN: CPT | Mod: 95 | Performed by: DERMATOLOGY

## 2020-11-11 RX ORDER — CLINDAMYCIN HCL 300 MG
CAPSULE ORAL
Qty: 2 CAPSULE | Refills: 0 | Status: SHIPPED | OUTPATIENT
Start: 2020-11-11 | End: 2021-03-01

## 2020-11-11 ASSESSMENT — PAIN SCALES - GENERAL: PAINLEVEL: MODERATE PAIN (4)

## 2020-11-11 NOTE — PROGRESS NOTES
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 She doesn't notice much left after the biopsy. It is healing well. She's never had skin surgery before. She estimates it is about the size of a pencil eraser, but flatter.     ROS: Patient is generally feeling well today     Physical Examination:  General: Well-appearing, appropriately-developed individual.  Skin: No photos available.     Labs:  Path report reviewed, SCCIS at least, present at deep margin    Past Medical History:   Patient Active Problem List   Diagnosis     CARDIOVASCULAR SCREENING; LDL GOAL LESS THAN 160     Advanced directives, counseling/discussion     CTS (carpal tunnel syndrome)     Hypertension goal BP (blood pressure) < 140/90     Osteopenia     GERD without esophagitis     Lumbar radiculopathy     Past Medical History:   Diagnosis Date     Brain aneurysm      Bunion      CTS (carpal tunnel syndrome)      GERD without esophagitis      Hypertension goal BP (blood pressure) < 150/90 7/5/2017     Osteopenia      Past Surgical History:   Procedure Laterality Date     APPENDECTOMY       AS REVISE ULNAR NERVE AT ELBOW  2018     BACK SURGERY  2015     CARPAL TUNNEL RELEASE RT/LT Right      CRANIOTOMY, REPAIR ANEURYSM, COMBINED       STRIP VEIN       TOE SURGERY  03/2018       Social History:  Patient reports that she has never smoked. She has never used smokeless tobacco. She reports current alcohol use of about 1.0 - 2.0 standard drinks of alcohol per week. She reports that she does not use  drugs.    Family History:  Family History   Problem Relation Age of Onset     Sudden Death Mother         d. childbirth      Alcoholism Father 62     Myocardial Infarction Brother 68     Alzheimer Disease Sister      Diabetes No family hx of      Cancer No family hx of        Medications:  Current Outpatient Medications   Medication     Omeprazole (PRILOSEC PO)     No current facility-administered medications for this visit.         Allergies   Allergen Reactions     Penicillins        Impression and Recommendations (Patient Counseled on the Following):  1. SCCIS at least neck/chest  - The nature, risks, benefits, and alternatives to Mohs surgery were discussed. The patient would like to proceed with Mohs surgery.  - Schedule Mohs.   - **update - patient hesitant to have this treated during the COVID-19 pandemic. She describes minimal tumor remaining after the biopsy. Will post-pone treatment, but will need to treat sooner if regrowth occurring.   - Clindamycin 600mg po once one hour prior to surgery     Staff only    Ozzy Freeman DO    Department of Dermatology  [facility]: Phone: [phone], Fax:[phone]  [facility]: Phone: [phone], Fax: [phone]      _____________________________________________________________________________    Teledermatology information:  - Location of patient: [place]  - Patient presented as: provider referral  - Location of teledermatologist:  ([facility] )  - Reason teledermatology is appropriate:  of National Emergency Regarding Coronavirus disease (COVID 19) Outbreak  - Image quality and interpretability: None received.   - Physician has received verbal consent for a Video/Photos Visit from the patient? YES  - In-person dermatology visit recommendation: no, schedule Mohs   - Date of images: None   - Service start time:  1100  - Service end time:  1110  - Date of report: 11/11/2020

## 2020-11-11 NOTE — NURSING NOTE
[facility] Dermatologic Surgery Pre-Surgery Screening Note     Pre-screening Information:  Hx of Skin Cancer: No  Hx of Mohs Surgery: No  Transplant: No  Immunocompromised: No  Current Anticoagulant(s): None  Bleeding Disorder(s): No  Stent: No  Pacemaker: No  Defibrillator: No  Brain/Nerve Stimulator: No  Endocarditis/Rheumatic Fever Hx: No  Vascular graft: No  Prophylactic Antibiotic Needed: Yes  Prophylactic Antibotic: Clindamycin  Congenital heart defect: No  Prosthetic Heart Valve: No  Lesion on Leg/Groin: No  Prosthetic Joint : No  Diabetic: No  HIV/AIDS: No  Hepatitis: No  Pregnant: No  Prior Problem with Local Anesthesia: No      Medications/Allergies  Medications and allergies review with patient: Yes     Current Outpatient Medications   Medication Sig Dispense Refill     clindamycin (CLEOCIN) 300 MG capsule Take two capsules by mouth one hour prior to your skin surgery appointment. 2 capsule 0     Omeprazole (PRILOSEC PO) Take by mouth daily       Allergies   Allergen Reactions     Penicillins        Pertinent Labs:  Last Creatine:   Creatinine   Date Value Ref Range Status   02/12/2019 0.76 0.52 - 1.04 mg/dL Final     Last INR: No results found for: INR    Other Questions:    Reminded patient to take any order prophylactic antibiotics 1 hour prior to appointment: Yes    If on a prescribed anticoagulant, advised patient to continue or check with prescribing provider:     If on an OTC anticoagulant/supplement, advised patient to hold these medications 10-14 days prior to surgery and resume 48 hrs after surgery:      Please be aware that this can be an all day procedure. Please bring your daily medications and food/cash. Encourage patient to eat prior to procedure(s). After care instructions were reviewed with patient.    If any positives, send to RN to initiate antibiotic prophylaxis protocol and/or anticoagulation management protocol    Reviewed by:    Oneida Camacho LPN

## 2020-11-11 NOTE — LETTER
11/11/2020         RE: Brenna Watkins  [address]  [place] [address]        Dear Colleague,    Thank you for referring your patient, Brenna Watkins, to the [facility]. Please see a copy of my visit note below.    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 She doesn't notice much left after the biopsy. It is healing well. She's never had skin surgery before. She estimates it is about the size of a pencil eraser, but flatter.     ROS: Patient is generally feeling well today     Physical Examination:  General: Well-appearing, appropriately-developed individual.  Skin: No photos available.     Labs:  Path report reviewed, SCCIS at least, present at deep margin    Past Medical History:   Patient Active Problem List   Diagnosis     CARDIOVASCULAR SCREENING; LDL GOAL LESS THAN 160     Advanced directives, counseling/discussion     CTS (carpal tunnel syndrome)     Hypertension goal BP (blood pressure) < 140/90     Osteopenia     GERD without esophagitis     Lumbar radiculopathy     Past Medical History:   Diagnosis Date     Brain aneurysm      Bunion      CTS (carpal tunnel syndrome)      GERD without esophagitis      Hypertension goal BP (blood pressure) < 150/90 7/5/2017     Osteopenia      Past Surgical History:   Procedure Laterality Date     APPENDECTOMY       AS REVISE ULNAR NERVE AT ELBOW  2018     BACK SURGERY  2015     CARPAL TUNNEL RELEASE RT/LT Right      CRANIOTOMY, REPAIR ANEURYSM, COMBINED       STRIP VEIN        TOE SURGERY  03/2018       Social History:  Patient reports that she has never smoked. She has never used smokeless tobacco. She reports current alcohol use of about 1.0 - 2.0 standard drinks of alcohol per week. She reports that she does not use drugs.    Family History:  Family History   Problem Relation Age of Onset     Sudden Death Mother         d. childbirth      Alcoholism Father 62     Myocardial Infarction Brother 68     Alzheimer Disease Sister      Diabetes No family hx of      Cancer No family hx of        Medications:  Current Outpatient Medications   Medication     Omeprazole (PRILOSEC PO)     No current facility-administered medications for this visit.         Allergies   Allergen Reactions     Penicillins        Impression and Recommendations (Patient Counseled on the Following):  1. SCCIS at least neck/chest  - The nature, risks, benefits, and alternatives to Mohs surgery were discussed. The patient would like to proceed with Mohs surgery.  - Schedule Mohs.   - **update - patient hesitant to have this treated during the COVID-19 pandemic. She describes minimal tumor remaining after the biopsy. Will post-pone treatment, but will need to treat sooner if regrowth occurring.   - Clindamycin 600mg po once one hour prior to surgery     Staff only    Ozzy Freeman DO    Department of Dermatology  [facility]: Phone: [phone], Fax:[phone]  [facility]: Phone: [phone], Fax: [phone]      _____________________________________________________________________________    Teledermatology information:  - Location of patient: [place]  - Patient presented as: provider referral  - Location of teledermatologist:  ([facility] )  - Reason teledermatology is appropriate:  of National Emergency Regarding Coronavirus disease (COVID 19) Outbreak  - Image  quality and interpretability: None received.   - Physician has received verbal consent for a Video/Photos Visit from the patient? YES  - In-person dermatology visit recommendation: no, schedule Mohs   - Date of images: None   - Service start time: 1100  - Service end time:  1110  - Date of report: 11/11/2020         Again, thank you for allowing me to participate in the care of your patient.        Sincerely,        Ozzy Freeman MD

## 2020-11-11 NOTE — NURSING NOTE
"Teledermatology Nurse Call for NEW Patients (not seen in last 3 years):     The patient was contacted by phone and we reviewed they have a visit in teledermatology upcoming with an MD or RITU;  Importantly, \"a teledermatology visit may not be as thorough as an in-person visit, and the quality of the photograph and/or video sent may not be of the same quality as that taken by the dermatology clinic.\"     This video visit will be conducted via a call between you and your physician/provider via [email or web address]. We have found that certain health care needs can be provided without the need for an in-person physical exam.  This service lets us provide the care you need with a video conversation.  If a prescription is necessary we can send it directly to your pharmacy.  If lab work is needed we can place an order for that and you can then stop by our lab to have the test done at a later time.If during the course of the call the physician/provider feels a video visit is not appropriate, you will not be charged for this service.Visits are billed at different rates depending on your insurance coverage. Please reach out to your insurance provider with any questions.    The patient will also send photographs via [employer] for review. Instructions for photography are/were sent to the patient via [employer] messaging.       The patient verified the location of the photo/video visit to be [place].(The physician must be notified by nurse if the patient is not in the state of [place] during the encounter)    The patient denied skin pain, fever, mucosal symptoms(lesions, blisters, sores in the mouth, nose, eyes, or genitals)  IF PATIENT ENDORSES ANY OF THESE STOP AND PAGE ON CALL ATTENDING    Additional notes:  Patient summary of issue: Positive SCCIS  bx   Location of problem on body: chest/neck   Other symptoms include the following: Patient reported some tenderness to the site. Patient voices that a part of the lesion is still present; protrudes a " sheyla. (Patient unsuccessful at sending photos).  The patient has not seen a dermatologist.   The patient hashas a past medical history significant for SCCIS   ROS: The patient is generally feeling well.       LXIONG3, MEDICAL ASSISTANT

## 2020-11-11 NOTE — PATIENT INSTRUCTIONS
[facility] Dermatology Visit    Thank you for allowing us to participate in your care. Your findings, instructions and follow-up plan are as follows:      When should I call my doctor?    If you are worsening or not improving, please, contact us or seek urgent care as noted below.     Who should I call with questions (adults)?    [facility] (adult and pediatric): [phone]     [facility] (adult): [phone]    For urgent needs outside of business hours call the [facility] at [phone] and ask for the dermatology resident on call    If this is a medical emergency and you are unable to reach an ER, Call 911      Who should I call with questions (pediatric)?  [facility]- Pediatric Dermatology  Dr. Nae Villabla, Dr. Patience Gibbs, Dr. Flora Lopez, Sarai Silva, PA  Dr. Marine Head, Dr. Ivania Mota & Dr. German Khoury  Non Urgent  Nurse Triage Line; [phone]- Reva and Tayler RN Care Coordinators   Irma (/Complex ) [phone]    If you need a prescription refill, please contact your pharmacy. Refills are approved or denied by our Physicians during normal business hours, Monday through Fridays  Per office policy, refills will not be granted if you have not been seen within the past year (or sooner depending on your child's condition)    Scheduling Information:  Pediatric Appointment Scheduling and Call Center [phone]  Radiology Scheduling- [phone]  Sedation Unit Scheduling- [phone]  [place] Scheduling- General [phone]; Pediatric Dermatology [phone]  Main  Services: [phone]  Zambian: [phone]  Egyptian: [phone]  Hmong/Sinhala/Ukrainian: [phone]  Preadmission Nursing Department Fax Number: [phone] (Fax all pre-operative paperwork to this number)    For urgent matters arising during evenings,  weekends, or holidays that cannot wait for normal business hours please call [phone] and ask for the Dermatology Resident On-Call to be paged.

## 2020-11-17 ENCOUNTER — TELEPHONE (OUTPATIENT)
Dept: DERMATOLOGY | Facility: CLINIC | Age: 85
End: 2020-11-17

## 2020-11-17 NOTE — TELEPHONE ENCOUNTER
[facility] Call Center    Phone Message    May a detailed message be left on voicemail: yes     Reason for Call: The patient called to cancel appointment for Thursday 11/19/20. She stated she wants to wait until after covid. Please advise. Thank you.    Action Taken: Message routed to:  Adult Clinics: Dermatology p [phone]    Travel Screening: Not Applicable

## 2020-11-17 NOTE — TELEPHONE ENCOUNTER
Nurse returned patients call and patient stated that she is very nervous about COVID-19 and would like to wait until things calm down. Nurse informed patient about the risks of waiting longer including risk of spot growing bigger and risk of bigger scar. Pt confirmed understanding of the risks. Nurse let patient know she will send this message to Dr. Freeman to review and advise.     Dilcia Burris LPN

## 2020-11-19 NOTE — TELEPHONE ENCOUNTER
Since we have not met her or seen pictures of the biopsy site, it's challenging to predict how long it would be safe to postpone. The unknown duration of COVID risk is also a challenge (widespread vaccination may not occur for 6 months). If she can send us a photo somehow, it would make our decision easier. If not, it sounds like waiting until early January is unlikely to change the ultimate outcome (curability or scar size). If she would like to discuss it with me, that is ok too.

## 2021-01-20 NOTE — TELEPHONE ENCOUNTER
Writer spoke with patient in regards to cancelling MOHS procedure on chest in November and again 1/6/2021.     Patient states that she can not see where the biopsy was done and the skin has healed over to normal skin color and is flat therefore she does not want to schedule at this time.     Writer explained that when the pathologist looked at the biopsy the margins were still positive therefore even though the area has healed at this time some of the skin cancer cells may still be present in the skin. Informed that delaying treatment could mean that by the time the skin cancer resurfaces that it may have grown deeper/larger than if treated at this time or metastasize.       Patient denied speaking with dr. feliz about treatment for she states she has already done this and firmly stated that she does not want to schedule at this time but has the clinics number if she changes her mind.     Oneida Camacho, CARLOS A

## 2021-02-01 NOTE — TELEPHONE ENCOUNTER
Per Dr. Freeman request routing to Neha Mcmillan CNP who referred patient for treatment as SHAGUFTA Camacho LPN

## 2021-03-01 ENCOUNTER — OFFICE VISIT (OUTPATIENT)
Dept: FAMILY MEDICINE | Facility: CLINIC | Age: 86
End: 2021-03-01
Payer: COMMERCIAL

## 2021-03-01 VITALS
HEIGHT: 62 IN | WEIGHT: 152 LBS | TEMPERATURE: 98.2 F | DIASTOLIC BLOOD PRESSURE: 82 MMHG | HEART RATE: 77 BPM | BODY MASS INDEX: 27.97 KG/M2 | SYSTOLIC BLOOD PRESSURE: 150 MMHG | OXYGEN SATURATION: 96 %

## 2021-03-01 DIAGNOSIS — R60.9 DEPENDENT EDEMA: ICD-10-CM

## 2021-03-01 DIAGNOSIS — R06.09 DYSPNEA ON EXERTION: Primary | ICD-10-CM

## 2021-03-01 DIAGNOSIS — I10 HYPERTENSION GOAL BP (BLOOD PRESSURE) < 140/90: ICD-10-CM

## 2021-03-01 LAB
BASOPHILS # BLD AUTO: 0 10E9/L (ref 0–0.2)
BASOPHILS NFR BLD AUTO: 0.2 %
DIFFERENTIAL METHOD BLD: NORMAL
EOSINOPHIL # BLD AUTO: 0.1 10E9/L (ref 0–0.7)
EOSINOPHIL NFR BLD AUTO: 0.8 %
ERYTHROCYTE [DISTWIDTH] IN BLOOD BY AUTOMATED COUNT: 13.9 % (ref 10–15)
HCT VFR BLD AUTO: 43.7 % (ref 35–47)
HGB BLD-MCNC: 13.9 G/DL (ref 11.7–15.7)
LYMPHOCYTES # BLD AUTO: 2.5 10E9/L (ref 0.8–5.3)
LYMPHOCYTES NFR BLD AUTO: 42 %
MCH RBC QN AUTO: 30 PG (ref 26.5–33)
MCHC RBC AUTO-ENTMCNC: 31.8 G/DL (ref 31.5–36.5)
MCV RBC AUTO: 94 FL (ref 78–100)
MONOCYTES # BLD AUTO: 0.6 10E9/L (ref 0–1.3)
MONOCYTES NFR BLD AUTO: 10 %
NEUTROPHILS # BLD AUTO: 2.8 10E9/L (ref 1.6–8.3)
NEUTROPHILS NFR BLD AUTO: 47 %
PLATELET # BLD AUTO: 273 10E9/L (ref 150–450)
RBC # BLD AUTO: 4.63 10E12/L (ref 3.8–5.2)
WBC # BLD AUTO: 6 10E9/L (ref 4–11)

## 2021-03-01 PROCEDURE — 84443 ASSAY THYROID STIM HORMONE: CPT | Performed by: FAMILY MEDICINE

## 2021-03-01 PROCEDURE — 36415 COLL VENOUS BLD VENIPUNCTURE: CPT | Performed by: FAMILY MEDICINE

## 2021-03-01 PROCEDURE — 99214 OFFICE O/P EST MOD 30 MIN: CPT | Performed by: FAMILY MEDICINE

## 2021-03-01 PROCEDURE — 80053 COMPREHEN METABOLIC PANEL: CPT | Performed by: FAMILY MEDICINE

## 2021-03-01 PROCEDURE — 85025 COMPLETE CBC W/AUTO DIFF WBC: CPT | Performed by: FAMILY MEDICINE

## 2021-03-01 ASSESSMENT — MIFFLIN-ST. JEOR: SCORE: 1092.72

## 2021-03-01 NOTE — LETTER
March 3, 2021      Brenna Watkins  [address] [place], [place] [id number]        Dear ,    We are writing to inform you of your test results.    Your blood counts, liver, kidney, blood glucose, and thyroid tests are normal.      Resulted Orders   CBC with platelets and differential   Result Value Ref Range    WBC 6.0 4.0 - 11.0 10e9/L    RBC Count 4.63 3.8 - 5.2 10e12/L    Hemoglobin 13.9 11.7 - 15.7 g/dL    Hematocrit 43.7 35.0 - 47.0 %    MCV 94 78 - 100 fl    MCH 30.0 26.5 - 33.0 pg    MCHC 31.8 31.5 - 36.5 g/dL    RDW 13.9 10.0 - 15.0 %    Platelet Count 273 150 - 450 10e9/L    % Neutrophils 47.0 %    % Lymphocytes 42.0 %    % Monocytes 10.0 %    % Eosinophils 0.8 %    % Basophils 0.2 %    Absolute Neutrophil 2.8 1.6 - 8.3 10e9/L    Absolute Lymphocytes 2.5 0.8 - 5.3 10e9/L    Absolute Monocytes 0.6 0.0 - 1.3 10e9/L    Absolute Eosinophils 0.1 0.0 - 0.7 10e9/L    Absolute Basophils 0.0 0.0 - 0.2 10e9/L    Diff Method Automated Method    Comprehensive metabolic panel   Result Value Ref Range    Sodium 137 133 - 144 mmol/L    Potassium 4.4 3.4 - 5.3 mmol/L    Chloride 105 94 - 109 mmol/L    Carbon Dioxide 30 20 - 32 mmol/L    Anion Gap 2 (L) 3 - 14 mmol/L    Glucose 80 70 - 99 mg/dL      Comment:      Non Fasting    Urea Nitrogen 19 7 - 30 mg/dL    Creatinine 0.78 0.52 - 1.04 mg/dL    GFR Estimate 70 >60 mL/min/[1.73_m2]      Comment:      Non  GFR Calc  Starting 12/18/2018, serum creatinine based estimated GFR (eGFR) will be   calculated using the Chronic Kidney Disease Epidemiology Collaboration   (CKD-EPI) equation.      GFR Estimate If Black 81 >60 mL/min/[1.73_m2]      Comment:       GFR Calc  Starting 12/18/2018, serum creatinine based estimated GFR (eGFR) will be   calculated using the Chronic Kidney Disease Epidemiology Collaboration   (CKD-EPI) equation.      Calcium 9.6 8.5 - 10.1 mg/dL    Bilirubin Total 0.3 0.2 - 1.3 mg/dL    Albumin 3.9 3.4 - 5.0 g/dL     Protein Total 7.5 6.8 - 8.8 g/dL    Alkaline Phosphatase 78 40 - 150 U/L    ALT 22 0 - 50 U/L    AST 16 0 - 45 U/L   TSH with free T4 reflex   Result Value Ref Range    TSH 2.52 0.40 - 4.00 mU/L       If you have any questions or concerns, please call the clinic at the number listed above.       Sincerely,      Jocelin Guajardo MD/luna

## 2021-03-01 NOTE — PROGRESS NOTES
"    Assessment & Plan     Dyspnea on exertion  -chronic   prior spirometry, chest CT and stress echocardiogram showed no etiology, but given patient age further evaluation via referral is reasonable   - CBC with platelets and differential  - Comprehensive metabolic panel  - CARDIOLOGY EVAL ADULT REFERRAL; Future    Dependent edema  -consistent with venous insufficiency   -Lower extremity elevation, low-salt diet, compression stockings, and call or return to clinic as needed    - CBC with platelets and differential  - Comprehensive metabolic panel  - TSH with free T4 reflex  - CARDIOLOGY EVAL ADULT REFERRAL; Future    Hypertension goal BP (blood pressure) < 140/90  -recommended lisinopril 5 mg daily, which she'll consider upon recheck   - CARDIOLOGY EVAL ADULT REFERRAL; Future             BMI:   Estimated body mass index is 27.8 kg/m  as calculated from the following:    Height as of this encounter: 1.575 m (5' 2\").    Weight as of this encounter: 68.9 kg (152 lb).       See Patient Instructions    Return in about 6 weeks (around 4/12/2021) for Wellness visit, blood pressure.    Jocelin Guajardo MD  [facility] JUAN Ceja is a 86 year old who presents for the following health issues     HPI       Chief Complaint   Patient presents with     Breathing Problem     Painful breathing with walking     Groin Pain     Left     Brenna Watkins is a 86 year old female who presents with chronic dyspnea on exertion. Symptom onset has been gradual, worsening for a time period of 4 years. Severity is described as moderate. Course of her symptoms over time is worsening.       Review of Systems   CONSTITUTIONAL: NEGATIVE for fever, chills, change in weight  INTEGUMENTARY/SKIN: NEGATIVE for worrisome rashes, moles or lesions  ENT/MOUTH: NEGATIVE for ear, mouth and throat problems  RESP: NEGATIVE for significant cough or SOB  CV: POSITIVE for dyspnea on exertion and lower extremity edema and NEGATIVE for " "chest pain/chest pressure, irregular heart beat, orthopnea and paroxysmal nocturnal dyspnea  MUSCULOSKELETAL: back pain  ENDOCRINE: NEGATIVE for temperature intolerance, skin/hair changes  HEME: NEGATIVE for bleeding problems  PSYCHIATRIC: caregiver burden -  has Alzheimer's       Objective    BP (!) 150/82   Pulse 77   Temp 98.2  F (36.8  C) (Oral)   Ht 1.575 m (5' 2\")   Wt 68.9 kg (152 lb)   SpO2 96%   Breastfeeding No   BMI 27.80 kg/m    Body mass index is 27.8 kg/m .  Physical Exam   GENERAL: healthy, alert and no distress  EYES: Eyes grossly normal to inspection, PERRL and conjunctivae and sclerae normal  HENT: ear canals and TM's normal, nose and mouth without ulcers or lesions  NECK: no adenopathy, no asymmetry, masses, or scars and thyroid normal to palpation  RESP: lungs clear to auscultation - no rales, rhonchi or wheezes  CV: regular rates and rhythm, normal S1 S2, no S3 or S4, no murmur, click or rub and trace woody bipedal edema   MS: no gross musculoskeletal defects noted   SKIN: spider veins of lower extremities   PSYCH: mentation appears normal, affect normal/bright    Office Visit on 09/22/2020   Component Date Value Ref Range Status     Copath Report 09/22/2020    Final                    Value:Patient Name: ADITHYA CHERY  MR#: [id number]  Specimen #: [id number]  Collected: 9/22/2020  Received: 9/23/2020  Reported: 9/25/2020 16:13  Ordering Phy(s): PRESTON DEL TORO    For improved result formatting, select 'View Enhanced Report Format' under   Linked Documents section.    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 The base of the lesion is   not well visualized and an invasive  component cannot be excluded. Rebiopsy or complete excision of the lesion   may be helpful for " "further  evaluation.    Electronically signed out by:    Kimo Feliciano M.D.    CLINICAL HISTORY:  Skin lesion    GROSS:  The specimen is received in formalin with the proper patient information,   labeled \"right neck/chest\". The  specimen consists of a skin shave of a 0.9 cm tan-brown villous papule.   Inked blue, sect                          ioned and entirely  submitted in one cassette. (Dictated by: Bryce Riley 9/23/2020 03:34   PM)    MICROSCOPIC:  Sections show an atypical squamous proliferation with a prominent   cutaneous horn. There is full-thickness  squamous atypia that extends to the edge of the specimen. An invasive   component cannot be excluded. Additional  levels were obtained.    This case was reviewed in consultation with Dr. Richardson, Dr. Evans, and   Dr. Arvizu, who concurs with the  interpretation.    The technical component of this testing was completed at the [facility], with the professional component performed   at the [facility], [address] [place] [address]  [phone] ([phone])    CPT Codes:  A: 01968-NU4    COLLECTION SITE:  Client: [facility]  Location: FZ (B)         No results found for this or any previous visit (from the past 24 hour(s)).            "

## 2021-03-02 LAB
ALBUMIN SERPL-MCNC: 3.9 G/DL (ref 3.4–5)
ALP SERPL-CCNC: 78 U/L (ref 40–150)
ALT SERPL W P-5'-P-CCNC: 22 U/L (ref 0–50)
ANION GAP SERPL CALCULATED.3IONS-SCNC: 2 MMOL/L (ref 3–14)
AST SERPL W P-5'-P-CCNC: 16 U/L (ref 0–45)
BILIRUB SERPL-MCNC: 0.3 MG/DL (ref 0.2–1.3)
BUN SERPL-MCNC: 19 MG/DL (ref 7–30)
CALCIUM SERPL-MCNC: 9.6 MG/DL (ref 8.5–10.1)
CHLORIDE SERPL-SCNC: 105 MMOL/L (ref 94–109)
CO2 SERPL-SCNC: 30 MMOL/L (ref 20–32)
CREAT SERPL-MCNC: 0.78 MG/DL (ref 0.52–1.04)
GFR SERPL CREATININE-BSD FRML MDRD: 70 ML/MIN/{1.73_M2}
GLUCOSE SERPL-MCNC: 80 MG/DL (ref 70–99)
POTASSIUM SERPL-SCNC: 4.4 MMOL/L (ref 3.4–5.3)
PROT SERPL-MCNC: 7.5 G/DL (ref 6.8–8.8)
SODIUM SERPL-SCNC: 137 MMOL/L (ref 133–144)
TSH SERPL DL<=0.005 MIU/L-ACNC: 2.52 MU/L (ref 0.4–4)

## 2021-03-02 NOTE — RESULT ENCOUNTER NOTE
Mail letter:    Your blood counts, liver, kidney, blood glucose, and thyroid tests are normal.     Jocelin Guajardo MD

## 2021-04-07 ENCOUNTER — TRANSFERRED RECORDS (OUTPATIENT)
Dept: HEALTH INFORMATION MANAGEMENT | Facility: CLINIC | Age: 86
End: 2021-04-07

## 2021-04-28 ENCOUNTER — OFFICE VISIT (OUTPATIENT)
Dept: CARDIOLOGY | Facility: CLINIC | Age: 86
End: 2021-04-28
Attending: FAMILY MEDICINE
Payer: COMMERCIAL

## 2021-04-28 VITALS
OXYGEN SATURATION: 95 % | SYSTOLIC BLOOD PRESSURE: 152 MMHG | WEIGHT: 154 LBS | DIASTOLIC BLOOD PRESSURE: 83 MMHG | BODY MASS INDEX: 28.17 KG/M2 | HEART RATE: 80 BPM

## 2021-04-28 DIAGNOSIS — R06.09 DYSPNEA ON EXERTION: ICD-10-CM

## 2021-04-28 DIAGNOSIS — I10 HYPERTENSION GOAL BP (BLOOD PRESSURE) < 140/90: ICD-10-CM

## 2021-04-28 DIAGNOSIS — R94.39 ABNORMAL RESULT OF OTHER CARDIOVASCULAR FUNCTION STUDY: ICD-10-CM

## 2021-04-28 DIAGNOSIS — I25.118 CORONARY ARTERY DISEASE OF NATIVE ARTERY OF NATIVE HEART WITH STABLE ANGINA PECTORIS (H): Primary | ICD-10-CM

## 2021-04-28 PROCEDURE — 99205 OFFICE O/P NEW HI 60 MIN: CPT | Performed by: INTERNAL MEDICINE

## 2021-04-28 RX ORDER — MULTIPLE VITAMINS W/ MINERALS TAB 9MG-400MCG
1 TAB ORAL DAILY
COMMUNITY

## 2021-04-28 NOTE — PROGRESS NOTES
Referring provider: Jocelin Guajardo MD    HPI: Ms. Brenna Watkins is a 86 year old  female with PMH significant for   -Hypertension (new diagnosis) currently not on treatment    Patient is being seen today at request of Dr. Guajardo for dyspnea on exertion which has been gradually worsening over the last few years.  Particularly over the last 1 year she is having difficulty to walk with her friends even level ground.  She feels short of breath climbing stairs. Denies chest pain, palpitations, dizziness, syncope or lower extremity edema.  She tells me that she sometimes wakes up at night with shortness of breath.  Patient was recommended lisinopril 5 mg for hypertension when she was last seen on 3/1/2021 by Dr. Guajardo.  Today she tells me that she has not received any prescription.      She had an extensive work-up for dyspnea on exertion in 2017 and 2018 with spirometry, chest CT PE and stress echocardiogram which were all were unremarkable.  Most recent lab tests including CBC, BMP and TSH were all normal.    She has no history of diabetes mellitus, hyperlipidemia, coronary artery disease or any cardiac arrhythmia.  Patient tells me that 4 of her brothers  of MI younger than 65 years old.    No prior history of cardiac disease.  Lifetime non-smoker.    Patient is currently on omeprazole but no other medications.    Stress echocardiogram in 2017 showed a structurally normal heart with no inducible ischemia.  EKG 2018 shows sinus rhythm otherwise normal significant change.    Medications, personal, family, and social history reviewed with patient and revised.    PAST MEDICAL HISTORY:  Past Medical History:   Diagnosis Date     Brain aneurysm      Bunion      CTS (carpal tunnel syndrome)      GERD without esophagitis      Hypertension goal BP (blood pressure) < 150/90 2017     Osteopenia        CURRENT MEDICATIONS:  Current Outpatient Medications   Medication Sig Dispense Refill     Omeprazole  (PRILOSEC PO) Take by mouth daily         PAST SURGICAL HISTORY:  Past Surgical History:   Procedure Laterality Date     APPENDECTOMY       AS REVISE ULNAR NERVE AT ELBOW  2018     BACK SURGERY  2015     CARPAL TUNNEL RELEASE RT/LT Right      CRANIOTOMY, REPAIR ANEURYSM, COMBINED       STRIP VEIN       TOE SURGERY  03/2018       ALLERGIES:     Allergies   Allergen Reactions     Penicillins        FAMILY HISTORY:  Family History   Problem Relation Age of Onset     Sudden Death Mother         d. childbirth      Alcoholism Father 62     Myocardial Infarction Brother 68     Alzheimer Disease Sister      Diabetes No family hx of      Cancer No family hx of          SOCIAL HISTORY:  Social History     Tobacco Use     Smoking status: Never Smoker     Smokeless tobacco: Never Used   Substance Use Topics     Alcohol use: Yes     Alcohol/week: 1.0 - 2.0 standard drinks     Types: 1 - 2 Glasses of wine per week     Drug use: No       ROS:   Constitutional: No fever, chills, or sweats. Weight stable.   ENT: No visual disturbance, ear ache, epistaxis, sore throat.   Cardiovascular: As per HPI.   Respiratory: No cough, hemoptysis.    GI: No nausea, vomiting, hematemesis, melena, or hematochezia.   : No hematuria.   Integument: Negative.   Psychiatric: Negative.   Hematologic:  No easy bruising, no easy bleeding.  Neuro: Negative.   Endocrinology: No significant heat or cold intolerance   Musculoskeletal: No myalgia.    Exam:  BP (!) 152/83   Pulse 80   Wt 69.9 kg (154 lb)   SpO2 95%   BMI 28.17 kg/m    GENERAL APPEARANCE: alert and no distress  HEENT: no icterus, no central cyanosis  LYMPH/NECK: no adenopathy, no asymmetry, JVP not elevated, no carotid bruits.  RESPIRATORY: lungs clear to auscultation - no rales, rhonchi or wheezes, no use of accessory muscles, no retractions, respirations are unlabored, normal respiratory rate  CARDIOVASCULAR: regular rhythm, normal S1, S2, no S3 or S4 and no murmur, click or rub,  precordium quiet with normal PMI.  GI: soft, non tender  EXTREMITIES: peripheral pulses normal, no edema, varicose veins noted+  NEURO: alert, normal speech,and affect  VASC: Radial, dorsalis pedis and posterior tibialis pulses are normal in volumes and symmetric bilaterally.   SKIN: no ecchymoses, no rashes     I have reviewed the labs and personally reviewed the imaging below and made my comment in the assessment and plan.    Labs:  CBC RESULTS:   Lab Results   Component Value Date    WBC 6.0 03/01/2021    RBC 4.63 03/01/2021    HGB 13.9 03/01/2021    HCT 43.7 03/01/2021    MCV 94 03/01/2021    MCH 30.0 03/01/2021    MCHC 31.8 03/01/2021    RDW 13.9 03/01/2021     03/01/2021       BMP RESULTS:  Lab Results   Component Value Date     03/01/2021    POTASSIUM 4.4 03/01/2021    CHLORIDE 105 03/01/2021    CO2 30 03/01/2021    ANIONGAP 2 (L) 03/01/2021    GLC 80 03/01/2021    BUN 19 03/01/2021    CR 0.78 03/01/2021    GFRESTIMATED 70 03/01/2021    GFRESTBLACK 81 03/01/2021    BAUTISTA 9.6 03/01/2021     Spirometry 2017: Normal  Echocardiogram 2017  Normal exercise echocardiogram without evidence of inducible ischemia.  Sensitivity of the test is reduced since target heart rate was not achieved.  With stress, the left ventricular ejection fraction increased from 55-60% to  greater than 65% and the left ventricular size decreased appropriately.  There was no ECG evidence of ischemia. Heart rate response to exercise was  normal, with hypertensive BP response.  No subjective symptoms to suggest ischemia.  No significant valve disease on screening doppler evaluation. The aortic root  and visualized ascending aorta are normal.    CT PE 2018: Lungs clear.  No evidence of pulmonary embolism.    EKG 2018 sinus rhythm otherwise unremarkable        Assessment and Plan:   Patient is being seen today for shortness of breath with activity gradually worsening over the last 4 years.  Work-up so far has been unremarkable.  She  is euvolemic on physical exam today.  She has no history of hypertension however clinic measurements show that she has hypertension.  She tells me that she has not received any prescription for hypertension.  I recommended repeating transthoracic echocardiogram and CT coronary angiogram to rule out obstructive coronary artery disease as cause of worsening shortness of breath with activity.  The patient is agreeable to proceed.  If TTE and CT are unremarkable I think she needs to be on low-dose hypertension treatment.  The only other possible cause for dyspnea on exertion is deconditioning as she tells me that she has gained weight and has been sedentary for at least a year.    #Dyspnea on exertion, gradually worsening over the last 1 year  -Unclear etiology  -Obtain transthoracic echocardiogram  -CT coronary angiogram    Return to clinic pending test results.    Total time spent today for this visit is 45 minutes including precharting, face-to-face clinic visit, review of labs/imaging and medical documentation.    Please donot hesitate to contact me if you have any questions or concerns. Again, thank you for allowing me to participate in the care of your patient.    Gil MARINO MD  [facility] Division of Cardiology  Pager [phone]    Addendum note 5/12/2021:    CT coronary angiogram 5/12/2021:  IMPRESSION:  1.  Diffuse coronary atherosclerosis, possibly with 3 vessel  obstructive CAD. Recommend invasive evaluation.  2.  Total Agatston score 249 placing the patient in the 59 percentile  when compared to age and gender matched control group.    I communicated the results of the CT coronary angiogram with the patient on the phone today.  Due to worsening dyspnea on exertion I recommended coronary angiogram given severe three-vessel disease detected on CTA. Risks of coronary angiogram, including but not limited to, death, MI, stroke, emergent bypass, bleeding and contrast related kidney injury were  discussed and patient is agreeable to proceed.    I recommended to start baby aspirin 81 mg, atorvastatin 20 mg and metoprolol 25 mg daily.     DR.CAN

## 2021-04-28 NOTE — LETTER
2021      RE: Brenna Watkins  [address] [id number]       Dear Colleague,    Thank you for the opportunity to participate in the care of your patient, Brenna Watkins, at the [facility]. Please see a copy of my visit note below.    Referring provider: Jocelin Guajardo MD    HPI: Ms. Brenna Watkins is a 86 year old  female with PMH significant for   -Hypertension (new diagnosis) currently not on treatment    Patient is being seen today at request of Dr. Guajardo for dyspnea on exertion which has been gradually worsening over the last few years.  Particularly over the last 1 year she is having difficulty to walk with her friends even level ground.  She feels short of breath climbing stairs. Denies chest pain, palpitations, dizziness, syncope or lower extremity edema.  She tells me that she sometimes wakes up at night with shortness of breath.  Patient was recommended lisinopril 5 mg for hypertension when she was last seen on 3/1/2021 by Dr. Guajardo.  Today she tells me that she has not received any prescription.      She had an extensive work-up for dyspnea on exertion in 2017 and 2018 with spirometry, chest CT PE and stress echocardiogram which were all were unremarkable.  Most recent lab tests including CBC, BMP and TSH were all normal.    She has no history of diabetes mellitus, hyperlipidemia, coronary artery disease or any cardiac arrhythmia.  Patient tells me that 4 of her brothers  of MI younger than 65 years old.    No prior history of cardiac disease.  Lifetime non-smoker.    Patient is currently on omeprazole but no other medications.    Stress echocardiogram in 2017 showed a structurally normal heart with no inducible ischemia.  EKG 2018 shows sinus rhythm otherwise normal significant change.    Medications, personal, family, and social history reviewed with patient and revised.    PAST  MEDICAL HISTORY:  Past Medical History:   Diagnosis Date     Brain aneurysm      Bunion      CTS (carpal tunnel syndrome)      GERD without esophagitis      Hypertension goal BP (blood pressure) < 150/90 7/5/2017     Osteopenia        CURRENT MEDICATIONS:  Current Outpatient Medications   Medication Sig Dispense Refill     Omeprazole (PRILOSEC PO) Take by mouth daily         PAST SURGICAL HISTORY:  Past Surgical History:   Procedure Laterality Date     APPENDECTOMY       AS REVISE ULNAR NERVE AT ELBOW  2018     BACK SURGERY  2015     CARPAL TUNNEL RELEASE RT/LT Right      CRANIOTOMY, REPAIR ANEURYSM, COMBINED       STRIP VEIN       TOE SURGERY  03/2018       ALLERGIES:     Allergies   Allergen Reactions     Penicillins        FAMILY HISTORY:  Family History   Problem Relation Age of Onset     Sudden Death Mother         d. childbirth      Alcoholism Father 62     Myocardial Infarction Brother 68     Alzheimer Disease Sister      Diabetes No family hx of      Cancer No family hx of          SOCIAL HISTORY:  Social History     Tobacco Use     Smoking status: Never Smoker     Smokeless tobacco: Never Used   Substance Use Topics     Alcohol use: Yes     Alcohol/week: 1.0 - 2.0 standard drinks     Types: 1 - 2 Glasses of wine per week     Drug use: No       ROS:   Constitutional: No fever, chills, or sweats. Weight stable.   ENT: No visual disturbance, ear ache, epistaxis, sore throat.   Cardiovascular: As per HPI.   Respiratory: No cough, hemoptysis.    GI: No nausea, vomiting, hematemesis, melena, or hematochezia.   : No hematuria.   Integument: Negative.   Psychiatric: Negative.   Hematologic:  No easy bruising, no easy bleeding.  Neuro: Negative.   Endocrinology: No significant heat or cold intolerance   Musculoskeletal: No myalgia.    Exam:  BP (!) 152/83   Pulse 80   Wt 69.9 kg (154 lb)   SpO2 95%   BMI 28.17 kg/m    GENERAL APPEARANCE: alert and no distress  HEENT: no icterus, no central  cyanosis  LYMPH/NECK: no adenopathy, no asymmetry, JVP not elevated, no carotid bruits.  RESPIRATORY: lungs clear to auscultation - no rales, rhonchi or wheezes, no use of accessory muscles, no retractions, respirations are unlabored, normal respiratory rate  CARDIOVASCULAR: regular rhythm, normal S1, S2, no S3 or S4 and no murmur, click or rub, precordium quiet with normal PMI.  GI: soft, non tender  EXTREMITIES: peripheral pulses normal, no edema, varicose veins noted+  NEURO: alert, normal speech,and affect  VASC: Radial, dorsalis pedis and posterior tibialis pulses are normal in volumes and symmetric bilaterally.   SKIN: no ecchymoses, no rashes     I have reviewed the labs and personally reviewed the imaging below and made my comment in the assessment and plan.    Labs:  CBC RESULTS:   Lab Results   Component Value Date    WBC 6.0 03/01/2021    RBC 4.63 03/01/2021    HGB 13.9 03/01/2021    HCT 43.7 03/01/2021    MCV 94 03/01/2021    MCH 30.0 03/01/2021    MCHC 31.8 03/01/2021    RDW 13.9 03/01/2021     03/01/2021       BMP RESULTS:  Lab Results   Component Value Date     03/01/2021    POTASSIUM 4.4 03/01/2021    CHLORIDE 105 03/01/2021    CO2 30 03/01/2021    ANIONGAP 2 (L) 03/01/2021    GLC 80 03/01/2021    BUN 19 03/01/2021    CR 0.78 03/01/2021    GFRESTIMATED 70 03/01/2021    GFRESTBLACK 81 03/01/2021    BAUTISTA 9.6 03/01/2021     Spirometry 2017: Normal  Echocardiogram 2017  Normal exercise echocardiogram without evidence of inducible ischemia.  Sensitivity of the test is reduced since target heart rate was not achieved.  With stress, the left ventricular ejection fraction increased from 55-60% to  greater than 65% and the left ventricular size decreased appropriately.  There was no ECG evidence of ischemia. Heart rate response to exercise was  normal, with hypertensive BP response.  No subjective symptoms to suggest ischemia.  No significant valve disease on screening doppler evaluation. The  aortic root  and visualized ascending aorta are normal.    CT PE 2018: Lungs clear.  No evidence of pulmonary embolism.    EKG 2018 sinus rhythm otherwise unremarkable        Assessment and Plan:   Patient is being seen today for shortness of breath with activity gradually worsening over the last 4 years.  Work-up so far has been unremarkable.  She is euvolemic on physical exam today.  She has no history of hypertension however clinic measurements show that she has hypertension.  She tells me that she has not received any prescription for hypertension.  I recommended repeating transthoracic echocardiogram and CT coronary angiogram to rule out obstructive coronary artery disease as cause of worsening shortness of breath with activity.  The patient is agreeable to proceed.  If TTE and CT are unremarkable I think she needs to be on low-dose hypertension treatment.  The only other possible cause for dyspnea on exertion is deconditioning as she tells me that she has gained weight and has been sedentary for at least a year.    #Dyspnea on exertion, gradually worsening over the last 1 year  -Unclear etiology  -Obtain transthoracic echocardiogram  -CT coronary angiogram    Return to clinic pending test results.    Total time spent today for this visit is 45 minutes including precharting, face-to-face clinic visit, review of labs/imaging and medical documentation.    Please donot hesitate to contact me if you have any questions or concerns. Again, thank you for allowing me to participate in the care of your patient.    Gil MARINO MD  [facility] Division of Cardiology  Pager [phone]

## 2021-04-28 NOTE — PATIENT INSTRUCTIONS
Thank you for coming to the [facility] @ [facility]; please note the following instructions:    1. CTA angiogram    2. Echocardiogram    3. Follow up as needed         If you have any questions regarding your visit please contact your care team:     Cardiology  Telephone Number   Shelley TOLLIVER, RN  Lisbet ZARATE, RN   Helena LARA, RMDANIS RUBIO, RMDANIS TAYLOR, LPN   [phone] (option 1)   For scheduling appts:     [phone] (select option 1)       For the Device Clinic (Pacemakers and ICD's)  RN's :  Stella Campbell   During business hours: [phone]    *After business hours:  [phone] (select option 4)      Normal test result notifications will be released via [employer] or mailed within 7 business days.  All other test results, will be communicated via telephone once reviewed by your cardiologist.    If you need a medication refill please contact your pharmacy.  Please allow 3 business days for your refill to be completed.    As always, thank you for trusting us with your health care needs!

## 2021-04-28 NOTE — NURSING NOTE
"Chief Complaint   Patient presents with     Shortness of Breath     per patient sob. and fatigue at times        Initial BP (!) 145/89 (BP Location: Left arm, Patient Position: Sitting, Cuff Size: Adult Large)   Pulse 90   Wt 69.9 kg (154 lb)   SpO2 95%   BMI 28.17 kg/m   Estimated body mass index is 28.17 kg/m  as calculated from the following:    Height as of 3/1/21: 1.575 m (5' 2\").    Weight as of this encounter: 69.9 kg (154 lb)..  BP completed using cuff size: large    Arlette Martinez L.P.N.    "

## 2021-05-10 ENCOUNTER — ANCILLARY PROCEDURE (OUTPATIENT)
Dept: CARDIOLOGY | Facility: CLINIC | Age: 86
End: 2021-05-10
Attending: INTERNAL MEDICINE
Payer: COMMERCIAL

## 2021-05-10 DIAGNOSIS — R06.09 DYSPNEA ON EXERTION: ICD-10-CM

## 2021-05-10 PROCEDURE — 93306 TTE W/DOPPLER COMPLETE: CPT | Performed by: INTERNAL MEDICINE

## 2021-05-11 ENCOUNTER — TELEPHONE (OUTPATIENT)
Dept: NURSING | Facility: CLINIC | Age: 86
End: 2021-05-11

## 2021-05-11 NOTE — TELEPHONE ENCOUNTER
"Brenna calls for the followin) Echo results - FNA advised that results were normal and letter mailed out today.    2) Asks about test scheduled tomorrow.   FNA advised:  - test was recommended on  by cardiology re: worsening SOB  - arrive by 2PM, for 3 PM appointment  - preparation for test was discussed with patient (FNA read generic \"patient instructions\")  - location: [facility]  - phone # for [phone]    To cardiology team: Please call patient to follow up if she needs to do anything further.     Tiffanie Cai RN/[facility] Nurse Advisor    "

## 2021-05-12 ENCOUNTER — HOSPITAL ENCOUNTER (OUTPATIENT)
Dept: CT IMAGING | Facility: CLINIC | Age: 86
Discharge: HOME | End: 2021-05-12
Attending: INTERNAL MEDICINE | Admitting: INTERNAL MEDICINE
Payer: COMMERCIAL

## 2021-05-12 ENCOUNTER — TELEPHONE (OUTPATIENT)
Dept: CARDIOLOGY | Facility: CLINIC | Age: 86
End: 2021-05-12

## 2021-05-12 VITALS
OXYGEN SATURATION: 97 % | RESPIRATION RATE: 18 BRPM | DIASTOLIC BLOOD PRESSURE: 68 MMHG | HEART RATE: 64 BPM | SYSTOLIC BLOOD PRESSURE: 109 MMHG

## 2021-05-12 DIAGNOSIS — R06.09 DYSPNEA ON EXERTION: ICD-10-CM

## 2021-05-12 DIAGNOSIS — R94.39 ABNORMAL RESULT OF OTHER CARDIOVASCULAR FUNCTION STUDY: ICD-10-CM

## 2021-05-12 PROCEDURE — 250N000013 HC RX MED GY IP 250 OP 250 PS 637: Performed by: INTERNAL MEDICINE

## 2021-05-12 PROCEDURE — 75574 CT ANGIO HRT W/3D IMAGE: CPT | Mod: 26 | Performed by: INTERNAL MEDICINE

## 2021-05-12 PROCEDURE — 75574 CT ANGIO HRT W/3D IMAGE: CPT

## 2021-05-12 PROCEDURE — 250N000011 HC RX IP 250 OP 636: Performed by: INTERNAL MEDICINE

## 2021-05-12 RX ORDER — IOPAMIDOL 755 MG/ML
120 INJECTION, SOLUTION INTRAVASCULAR ONCE
Status: COMPLETED | OUTPATIENT
Start: 2021-05-12 | End: 2021-05-12

## 2021-05-12 RX ORDER — ONDANSETRON 2 MG/ML
4 INJECTION INTRAMUSCULAR; INTRAVENOUS
Status: DISCONTINUED | OUTPATIENT
Start: 2021-05-12 | End: 2021-05-13 | Stop reason: HOSPADM

## 2021-05-12 RX ORDER — METOPROLOL TARTRATE 25 MG/1
25-100 TABLET, FILM COATED ORAL
Status: COMPLETED | OUTPATIENT
Start: 2021-05-12 | End: 2021-05-12

## 2021-05-12 RX ORDER — ACYCLOVIR 200 MG/1
0-1 CAPSULE ORAL
Status: DISCONTINUED | OUTPATIENT
Start: 2021-05-12 | End: 2021-05-13 | Stop reason: HOSPADM

## 2021-05-12 RX ORDER — IVABRADINE 5 MG/1
5-15 TABLET, FILM COATED ORAL
Status: COMPLETED | OUTPATIENT
Start: 2021-05-12 | End: 2021-05-12

## 2021-05-12 RX ORDER — METOPROLOL TARTRATE 1 MG/ML
5-15 INJECTION, SOLUTION INTRAVENOUS
Status: DISCONTINUED | OUTPATIENT
Start: 2021-05-12 | End: 2021-05-13 | Stop reason: HOSPADM

## 2021-05-12 RX ORDER — METHYLPREDNISOLONE SODIUM SUCCINATE 125 MG/2ML
125 INJECTION, POWDER, LYOPHILIZED, FOR SOLUTION INTRAMUSCULAR; INTRAVENOUS
Status: DISCONTINUED | OUTPATIENT
Start: 2021-05-12 | End: 2021-05-13 | Stop reason: HOSPADM

## 2021-05-12 RX ORDER — DIPHENHYDRAMINE HCL 25 MG
25 CAPSULE ORAL
Status: DISCONTINUED | OUTPATIENT
Start: 2021-05-12 | End: 2021-05-13 | Stop reason: HOSPADM

## 2021-05-12 RX ORDER — DIPHENHYDRAMINE HYDROCHLORIDE 50 MG/ML
25-50 INJECTION INTRAMUSCULAR; INTRAVENOUS
Status: DISCONTINUED | OUTPATIENT
Start: 2021-05-12 | End: 2021-05-13 | Stop reason: HOSPADM

## 2021-05-12 RX ORDER — NITROGLYCERIN 0.4 MG/1
.4-.8 TABLET SUBLINGUAL
Status: DISCONTINUED | OUTPATIENT
Start: 2021-05-12 | End: 2021-05-13 | Stop reason: HOSPADM

## 2021-05-12 RX ADMIN — NITROGLYCERIN 0.8 MG: 0.4 TABLET SUBLINGUAL at 15:29

## 2021-05-12 RX ADMIN — IVABRADINE 15 MG: 5 TABLET, FILM COATED ORAL at 14:05

## 2021-05-12 RX ADMIN — METOPROLOL TARTRATE 100 MG: 100 TABLET, FILM COATED ORAL at 14:05

## 2021-05-12 RX ADMIN — IOPAMIDOL 120 ML: 755 INJECTION, SOLUTION INTRAVENOUS at 15:24

## 2021-05-12 NOTE — TELEPHONE ENCOUNTER
I called and talked to the patient.  I communicated the results of CT coronary angiogram.  I have discussed medical treatment option versus coronary angiogram.  The patient opted to undergo coronary angiogram.Risks of coronary angiogram, including but not limited to, death, MI, stroke, emergent bypass, bleeding and contrast related kidney injury were discussed and patient is agreeable to proceed.

## 2021-05-12 NOTE — PROGRESS NOTES
Pt arrived for Coronary CT angiogram. Test, meds and side effects reviewed with pt.  Resting HR  90 bpm. Given 100 mg PO Metoprolol + 15 mg PO Ivabradine per verbal order. Administered 0.8 mg SL nitro  on CTA table per order. CTA completed.  Patient tolerated procedure well and denies symptoms of allergic reaction.  Post monitoring completed and VSS.  D/C instructions reviewed with pt whom verbalized understanding of need to increase PO fluids today. D/C to gold waiting room accompanied by staff.

## 2021-05-13 ENCOUNTER — TELEPHONE (OUTPATIENT)
Dept: CARDIOLOGY | Facility: CLINIC | Age: 86
End: 2021-05-13

## 2021-05-13 DIAGNOSIS — R94.39 ABNORMAL RESULT OF OTHER CARDIOVASCULAR FUNCTION STUDY: Primary | ICD-10-CM

## 2021-05-13 DIAGNOSIS — R06.09 DOE (DYSPNEA ON EXERTION): ICD-10-CM

## 2021-05-13 RX ORDER — LIDOCAINE 40 MG/G
CREAM TOPICAL
Status: CANCELLED | OUTPATIENT
Start: 2021-05-13

## 2021-05-13 RX ORDER — ATORVASTATIN CALCIUM 20 MG/1
20 TABLET, FILM COATED ORAL DAILY
Qty: 90 TABLET | Refills: 3 | Status: SHIPPED | OUTPATIENT
Start: 2021-05-13 | End: 2022-05-03

## 2021-05-13 RX ORDER — ASPIRIN 81 MG/1
324 TABLET, CHEWABLE ORAL DAILY
Status: CANCELLED | OUTPATIENT
Start: 2021-05-13 | End: 2021-05-15

## 2021-05-13 RX ORDER — SODIUM CHLORIDE 9 MG/ML
INJECTION, SOLUTION INTRAVENOUS CONTINUOUS
Status: CANCELLED | OUTPATIENT
Start: 2021-05-13

## 2021-05-13 RX ORDER — METOPROLOL SUCCINATE 25 MG/1
25 TABLET, EXTENDED RELEASE ORAL DAILY
Qty: 90 TABLET | Refills: 3 | Status: SHIPPED | OUTPATIENT
Start: 2021-05-13 | End: 2021-12-07

## 2021-05-13 RX ORDER — ASPIRIN 81 MG/1
81 TABLET, CHEWABLE ORAL DAILY
Qty: 90 TABLET | Refills: 3 | Status: SHIPPED | OUTPATIENT
Start: 2021-05-13 | End: 2022-05-04

## 2021-05-13 NOTE — CONFIDENTIAL NOTE
Coronary angiogram ordered.  Will contact patient to schedule.    Shelley Garcia, RN  Cardiology Care Coordinator  [facility]  [phone] option 1    Gil Willson MD  P  Cardiology             I called and talked to the patient.  I complicated the results of CT coronary angiogram.  I have discussed medical treatment option versus coronary angiogram.  The patient opted to undergo coronary angiogram.Risks of coronary angiogram, including but not limited to, death, MI, stroke, emergent bypass, bleeding and contrast related kidney injury were discussed and patient is agreeable to proceed.

## 2021-05-13 NOTE — TELEPHONE ENCOUNTER
Letter and packet mailed to patient.  Patient informed and advised to call clinic with any questions or concerns.  Patient verbalized understanding.      Shelley Garcia RN

## 2021-05-13 NOTE — LETTER
May 13, 2021      TO: Brenna Watkins  [address] [id number]         Dear Brenna,    You are scheduled for a Coronary Angiogram at the [facility], [address] [place] [address], [place] [address].   Please arrive to the [facility] Waiting Room on ____Tuesday, June 1st______  at _____12:30 pm arrival______.       You will need to undergo a COVID-19 PCR swab test within 4 days of procedure. You will receive a phone call with more information. If you do not hear from the [facility] scheduling team, please call: [phone] to schedule.      When you arrive you will be escorted back to the pre procedure area called 2A. Here they will insert an IV, draw labs, and obtain a short medical history. Please bring an updated list of your current medications.    A provider will come and talk with you about the procedure and obtain consent.    A nurse from the Cardiac Catheterization Lab will then escort you to the procedure area. You will be receiving sedation during the procedure so you will need someone to drive you to and from the hospital.    After the procedure you will recover for a short period (2 - 6 hours). You will be discharged with instructions for post procedure care. However, depending on what the angiogram shows you may have to have stents placed and this might require an overnight stay. We ask that you bring a small bag of necessities for your comfort if you would need to stay overnight. DO NOT BRING ANY VALUABLES!      Pre procedure instructions:  It is recommended that you undergo a COVID-19 PCR swab test 48-72 hours before procedure. You will receive a phone call with more information.   1. Make arrangements to have a  as you will not be allowed to drive following the procedure. Someone should stay with you the night after your procedure.  2.  Do not eat any solid food or milk products for 8 hours prior to the exam. You may drink clear liquids until 2 hours prior to  the exam. Clear liquids include the following: water, Jell-O, clear broth, apple juice or any non-carbonated drink that you can see through (no pop!).   3. Do not drink alcohol or smoke 24 hours prior to test.  4. You may take your morning medications as prescribed with a sip of water. If you currently take an aspirin, continue taking your same dose.   5.  Follow up with Rajwinder Mejia PA-C at [facility] 1 month after the coronary angiogram.  This is scheduled for Tuesday, June 29th at 1:30 pm    If you have further questions, please utilize [employer] to contact us.   If your question concerns the above instructions, contact:  Shelley Garcia RN  Cadiology Nurse Coordinator.  [phone]            Post Procedure Instructions:  For 24 hours:    Have an adult stay with you for 24 hours.    Relax and take it easy.    Drink plenty of fluids.    You may eat your normal diet, unless your doctor tells you otherwise.    Do NOT make any important or legal decisions.    Do NOT drive or operate machines at home or at work.    Do NOT drink alcohol.    Do NOT smoke.     Medicines:    If you have begun Plavix (clopidogrel), Effient (prasugrel), or Brilinta (ticagrelor), do not stop taking it until you talk to your heart doctor (cardiologist).     If you are on metformin (Glucophage), do not restart it until you have blood tests (within 2 to 3 days after discharge). When your doctor tells you it is safe, you may restart the metformin.    If you have stopped any other medicines, check with your nurse or provider about when to restart them.     Care of wrist or arm site:    It is normal to have soreness at the puncture site and mild tingling in your hand for up to 3 days.    Remove the Band-Aid after 24 hours. If there is minor oozing, apply another Band-aid and remove it after 12 hours.    Do NOT take a bath, or use a hot tub or pool for the next 48 hours. You may shower.    It is normal to have a small bruise. There should not  be a lump at the site.    Do not scrub the site.    Do not use lotion or powder near the puncture site for 3 days.     Activity Restrictions    For 2 days, do not use your hand or arm to support your weight (such as rising from a chair) or bend your wrist  (such as lifting a garage door).    For 2 days, do not lift more than 5 pounds or exercise your arm (tennis, golf or bowling).       If you start bleeding from the site in your arm: Sit down and press firmly on the site with your fingers for 10 minutes.  Call your doctor as soon as you can.  Call 911 right away if you have bleeding that is heavy or does not stop.  Call your doctor if:    You have a large or growing hard lump around the site.    The site is red, swollen, hot or tender.

## 2021-05-13 NOTE — TELEPHONE ENCOUNTER
Date: 5/13/2021    Time of Call: 9:43 AM     Diagnosis:  Abnormal CTA coronary artery     [ VORB ] Ordering provider: Dr. Willson  Order:   coronary angiogram  Start asa 81 mg daily  Atorvastatin 20 mg daily  Metoprolol 25 mg daiy  Follow up with MARTHA s/p coronary angiogram     Order received by: Shelley Garcia RN       Follow-up/additional notes: patient scheduled for June 1st coronary angiogram 12:30 pm arrival

## 2021-05-15 DIAGNOSIS — Z11.59 ENCOUNTER FOR SCREENING FOR OTHER VIRAL DISEASES: ICD-10-CM

## 2021-05-19 ENCOUNTER — TELEPHONE (OUTPATIENT)
Dept: CARDIOLOGY | Facility: CLINIC | Age: 86
End: 2021-05-19

## 2021-05-19 NOTE — CONFIDENTIAL NOTE
Dr. Willson, patient wants to know if she can get a spinal epidural prior to her angiogram that is scheduled for June 1st.  Please advise.    Shelley Garcia RN  Cardiology Care Coordinator  [facility]  [phone] option 1

## 2021-05-19 NOTE — TELEPHONE ENCOUNTER
Reason for call:  Other   Patient called regarding (reason for call): call back  Additional comments: Patient is calling to discuss a few this with Dr. Willson.    Please contact her back.     Phone number to reach patient:  Cell number on file:    Telephone Information:   Mobile [phone]       Best Time:      Can we leave a detailed message on this number?  YES    Travel screening: Not Applicable

## 2021-05-19 NOTE — TELEPHONE ENCOUNTER
Request to know if Epidural of spine next week approved , before  coronary angiogram .Arlette Martinez L.P.N.,[facility].

## 2021-05-19 NOTE — TELEPHONE ENCOUNTER
Sciatic nerve is acting up and she is trying to get an injection this week.  Per Dr. Willson, it is advisable to avoid getting an epidural injection within 1 week of the coronary angiogram.  Patient informed of MD's response.  Patient will call to get the epidural set up this week and will call back if any difficulties.    Shelley Garcia RN  Cardiology Care Coordinator  [facility]  [phone] option 1

## 2021-05-20 ENCOUNTER — TELEPHONE (OUTPATIENT)
Dept: CARDIOLOGY | Facility: CLINIC | Age: 86
End: 2021-05-20

## 2021-05-20 NOTE — TELEPHONE ENCOUNTER
Reason for Call:  Other call back    Detailed comments: Pt is going to be out of town during the 3-4 days the covid test is suppose to be done.  Pt has test scheduled for 5/27 @ [facility] 3:15pm.  She didn't know who to contact about this situation so if this time and date doesn't work for her procedure she would like someone to call her back and help her figure this out.    Phone Number Patient can be reached at: Cell number on file:    Telephone Information:   Mobile [phone]       Best Time: ASAP    Can we leave a detailed message on this number? YES    Call taken on 5/20/2021 at 1:09 PM by Monisha West

## 2021-05-20 NOTE — TELEPHONE ENCOUNTER
[facility]    Phone Message    May a detailed message be left on voicemail: no     Reason for Call: Other: Brenna is calling about the epidural.  She just wants more clarification around if she is able to get the epidural, and if not why.  Please give Brenna a call back at your earliest convenience.     Action Taken: Message routed to:  [facility] ([facility]  Cardiology    Travel Screening: Not Applicable

## 2021-05-26 NOTE — TELEPHONE ENCOUNTER
Patient states that the COVID team moved her testing to Thursday, May 27, her procedure is June 1st.

## 2021-05-27 DIAGNOSIS — Z11.59 ENCOUNTER FOR SCREENING FOR OTHER VIRAL DISEASES: ICD-10-CM

## 2021-05-27 LAB
SARS-COV-2 RNA RESP QL NAA+PROBE: NORMAL
SPECIMEN SOURCE: NORMAL

## 2021-05-27 PROCEDURE — U0003 INFECTIOUS AGENT DETECTION BY NUCLEIC ACID (DNA OR RNA); SEVERE ACUTE RESPIRATORY SYNDROME CORONAVIRUS 2 (SARS-COV-2) (CORONAVIRUS DISEASE [COVID-19]), AMPLIFIED PROBE TECHNIQUE, MAKING USE OF HIGH THROUGHPUT TECHNOLOGIES AS DESCRIBED BY CMS-2020-01-R: HCPCS | Performed by: INTERNAL MEDICINE

## 2021-05-27 PROCEDURE — U0005 INFEC AGEN DETEC AMPLI PROBE: HCPCS | Performed by: INTERNAL MEDICINE

## 2021-05-28 ENCOUNTER — TELEPHONE (OUTPATIENT)
Dept: CARDIOLOGY | Facility: CLINIC | Age: 86
End: 2021-05-28

## 2021-05-28 LAB
LABORATORY COMMENT REPORT: NORMAL
SARS-COV-2 RNA RESP QL NAA+PROBE: NEGATIVE
SPECIMEN SOURCE: NORMAL

## 2021-05-28 NOTE — TELEPHONE ENCOUNTER
Call complete for pre procedure reminder, travel screen and updated visitor policy.  COVID Negative on 5/27/2021. This is 5 days prior to procedure, so message sent to Dr. Zayas and Dr Monroy to make sure that this will be sufficient, since it is outside the cath lab protocol of testing within 4 days of procedure.     ADDENDUM: No further Covid testing required for this patient; MD aware of negative test on 5/27/2021 and is ok with proceeding with procedure.

## 2021-06-01 ENCOUNTER — RESEARCH ENCOUNTER (OUTPATIENT)
Dept: CARDIOLOGY | Facility: CLINIC | Age: 86
End: 2021-06-01

## 2021-06-01 ENCOUNTER — HOSPITAL ENCOUNTER (OUTPATIENT)
Facility: CLINIC | Age: 86
Discharge: HOME | End: 2021-06-01
Attending: INTERNAL MEDICINE | Admitting: INTERNAL MEDICINE
Payer: COMMERCIAL

## 2021-06-01 ENCOUNTER — APPOINTMENT (OUTPATIENT)
Dept: MEDSURG UNIT | Facility: CLINIC | Age: 86
End: 2021-06-01
Payer: COMMERCIAL

## 2021-06-01 ENCOUNTER — APPOINTMENT (OUTPATIENT)
Dept: LAB | Facility: CLINIC | Age: 86
End: 2021-06-01
Attending: INTERNAL MEDICINE
Payer: COMMERCIAL

## 2021-06-01 VITALS
RESPIRATION RATE: 20 BRPM | WEIGHT: 148 LBS | BODY MASS INDEX: 26.22 KG/M2 | SYSTOLIC BLOOD PRESSURE: 151 MMHG | DIASTOLIC BLOOD PRESSURE: 95 MMHG | HEIGHT: 63 IN | TEMPERATURE: 98 F | HEART RATE: 72 BPM | OXYGEN SATURATION: 95 %

## 2021-06-01 DIAGNOSIS — R94.39 ABNORMAL RESULT OF OTHER CARDIOVASCULAR FUNCTION STUDY: ICD-10-CM

## 2021-06-01 DIAGNOSIS — R06.09 DOE (DYSPNEA ON EXERTION): ICD-10-CM

## 2021-06-01 DIAGNOSIS — I25.118 CORONARY ARTERY DISEASE INVOLVING NATIVE CORONARY ARTERY OF NATIVE HEART WITH OTHER FORM OF ANGINA PECTORIS (H): Primary | ICD-10-CM

## 2021-06-01 PROBLEM — Z98.890 STATUS POST CORONARY ANGIOGRAM: Status: ACTIVE | Noted: 2021-06-01

## 2021-06-01 LAB
ANION GAP SERPL CALCULATED.3IONS-SCNC: 3 MMOL/L (ref 3–14)
BUN SERPL-MCNC: 21 MG/DL (ref 7–30)
CALCIUM SERPL-MCNC: 9.2 MG/DL (ref 8.5–10.1)
CHLORIDE SERPL-SCNC: 106 MMOL/L (ref 94–109)
CO2 SERPL-SCNC: 29 MMOL/L (ref 20–32)
CREAT SERPL-MCNC: 0.92 MG/DL (ref 0.52–1.04)
ERYTHROCYTE [DISTWIDTH] IN BLOOD BY AUTOMATED COUNT: 14.2 % (ref 10–15)
GFR SERPL CREATININE-BSD FRML MDRD: 57 ML/MIN/{1.73_M2}
GLUCOSE SERPL-MCNC: 88 MG/DL (ref 70–99)
HCT VFR BLD AUTO: 44 % (ref 35–47)
HGB BLD-MCNC: 14 G/DL (ref 11.7–15.7)
MCH RBC QN AUTO: 30.3 PG (ref 26.5–33)
MCHC RBC AUTO-ENTMCNC: 31.8 G/DL (ref 31.5–36.5)
MCV RBC AUTO: 95 FL (ref 78–100)
PLATELET # BLD AUTO: 251 10E9/L (ref 150–450)
POTASSIUM SERPL-SCNC: 4.2 MMOL/L (ref 3.4–5.3)
RBC # BLD AUTO: 4.62 10E12/L (ref 3.8–5.2)
SODIUM SERPL-SCNC: 138 MMOL/L (ref 133–144)
WBC # BLD AUTO: 7.8 10E9/L (ref 4–11)

## 2021-06-01 PROCEDURE — 85027 COMPLETE CBC AUTOMATED: CPT | Performed by: INTERNAL MEDICINE

## 2021-06-01 PROCEDURE — 250N000009 HC RX 250: Performed by: INTERNAL MEDICINE

## 2021-06-01 PROCEDURE — C1769 GUIDE WIRE: HCPCS | Performed by: INTERNAL MEDICINE

## 2021-06-01 PROCEDURE — 250N000011 HC RX IP 250 OP 636: Performed by: INTERNAL MEDICINE

## 2021-06-01 PROCEDURE — 258N000003 HC RX IP 258 OP 636: Performed by: INTERNAL MEDICINE

## 2021-06-01 PROCEDURE — C1725 CATH, TRANSLUMIN NON-LASER: HCPCS | Performed by: INTERNAL MEDICINE

## 2021-06-01 PROCEDURE — C1894 INTRO/SHEATH, NON-LASER: HCPCS | Performed by: INTERNAL MEDICINE

## 2021-06-01 PROCEDURE — 99153 MOD SED SAME PHYS/QHP EA: CPT | Performed by: INTERNAL MEDICINE

## 2021-06-01 PROCEDURE — C9600 PERC DRUG-EL COR STENT SING: HCPCS | Mod: LC | Performed by: INTERNAL MEDICINE

## 2021-06-01 PROCEDURE — C1874 STENT, COATED/COV W/DEL SYS: HCPCS | Performed by: INTERNAL MEDICINE

## 2021-06-01 PROCEDURE — 250N000013 HC RX MED GY IP 250 OP 250 PS 637: Performed by: INTERNAL MEDICINE

## 2021-06-01 PROCEDURE — C1760 CLOSURE DEV, VASC: HCPCS | Performed by: INTERNAL MEDICINE

## 2021-06-01 PROCEDURE — 93456 R HRT CORONARY ARTERY ANGIO: CPT | Mod: XU | Performed by: INTERNAL MEDICINE

## 2021-06-01 PROCEDURE — 93005 ELECTROCARDIOGRAM TRACING: CPT

## 2021-06-01 PROCEDURE — 36415 COLL VENOUS BLD VENIPUNCTURE: CPT | Performed by: INTERNAL MEDICINE

## 2021-06-01 PROCEDURE — 85347 COAGULATION TIME ACTIVATED: CPT

## 2021-06-01 PROCEDURE — 999N000142 HC STATISTIC PROCEDURE PREP ONLY

## 2021-06-01 PROCEDURE — 80048 BASIC METABOLIC PNL TOTAL CA: CPT | Performed by: INTERNAL MEDICINE

## 2021-06-01 PROCEDURE — 93010 ELECTROCARDIOGRAM REPORT: CPT | Mod: 76 | Performed by: INTERNAL MEDICINE

## 2021-06-01 PROCEDURE — 999N000054 HC STATISTIC EKG NON-CHARGEABLE

## 2021-06-01 PROCEDURE — 99152 MOD SED SAME PHYS/QHP 5/>YRS: CPT | Performed by: INTERNAL MEDICINE

## 2021-06-01 PROCEDURE — C1887 CATHETER, GUIDING: HCPCS | Performed by: INTERNAL MEDICINE

## 2021-06-01 PROCEDURE — 272N000001 HC OR GENERAL SUPPLY STERILE: Performed by: INTERNAL MEDICINE

## 2021-06-01 DEVICE — STENT SYNERGY DRUG ELUTING 3.00X12MM  H7493926012300: Type: IMPLANTABLE DEVICE | Status: FUNCTIONAL

## 2021-06-01 DEVICE — STENT SYNERGY DRUG ELUTING 3.50X16MM  H7493926016350: Type: IMPLANTABLE DEVICE | Status: FUNCTIONAL

## 2021-06-01 RX ORDER — NALOXONE HYDROCHLORIDE 0.4 MG/ML
0.2 INJECTION, SOLUTION INTRAMUSCULAR; INTRAVENOUS; SUBCUTANEOUS
Status: DISCONTINUED | OUTPATIENT
Start: 2021-06-01 | End: 2021-06-02 | Stop reason: HOSPADM

## 2021-06-01 RX ORDER — EPTIFIBATIDE 2 MG/ML
180 INJECTION, SOLUTION INTRAVENOUS EVERY 10 MIN PRN
Status: DISCONTINUED | OUTPATIENT
Start: 2021-06-01 | End: 2021-06-01 | Stop reason: HOSPADM

## 2021-06-01 RX ORDER — NITROGLYCERIN 20 MG/100ML
10-200 INJECTION INTRAVENOUS CONTINUOUS PRN
Status: DISCONTINUED | OUTPATIENT
Start: 2021-06-01 | End: 2021-06-01 | Stop reason: HOSPADM

## 2021-06-01 RX ORDER — ONDANSETRON 4 MG/1
4 TABLET, ORALLY DISINTEGRATING ORAL EVERY 6 HOURS PRN
Status: DISCONTINUED | OUTPATIENT
Start: 2021-06-01 | End: 2021-06-02 | Stop reason: HOSPADM

## 2021-06-01 RX ORDER — HYDRALAZINE HYDROCHLORIDE 10 MG/1
10 TABLET, FILM COATED ORAL ONCE
Status: DISCONTINUED | OUTPATIENT
Start: 2021-06-01 | End: 2021-06-02 | Stop reason: HOSPADM

## 2021-06-01 RX ORDER — LIDOCAINE 40 MG/G
CREAM TOPICAL
Status: DISCONTINUED | OUTPATIENT
Start: 2021-06-01 | End: 2021-06-01 | Stop reason: HOSPADM

## 2021-06-01 RX ORDER — SODIUM CHLORIDE 9 MG/ML
INJECTION, SOLUTION INTRAVENOUS CONTINUOUS
Status: ACTIVE | OUTPATIENT
Start: 2021-06-01 | End: 2021-06-01

## 2021-06-01 RX ORDER — NITROGLYCERIN 0.4 MG/1
0.4 TABLET SUBLINGUAL EVERY 5 MIN PRN
Status: DISCONTINUED | OUTPATIENT
Start: 2021-06-01 | End: 2021-06-02 | Stop reason: HOSPADM

## 2021-06-01 RX ORDER — OXYCODONE HYDROCHLORIDE 5 MG/1
5 TABLET ORAL EVERY 4 HOURS PRN
Status: DISCONTINUED | OUTPATIENT
Start: 2021-06-01 | End: 2021-06-02 | Stop reason: HOSPADM

## 2021-06-01 RX ORDER — HEPARIN SODIUM 10000 [USP'U]/100ML
100-1000 INJECTION, SOLUTION INTRAVENOUS CONTINUOUS PRN
Status: DISCONTINUED | OUTPATIENT
Start: 2021-06-01 | End: 2021-06-01 | Stop reason: HOSPADM

## 2021-06-01 RX ORDER — ASPIRIN 81 MG/1
81 TABLET, CHEWABLE ORAL ONCE
Status: DISCONTINUED | OUTPATIENT
Start: 2021-06-01 | End: 2021-06-01 | Stop reason: CLARIF

## 2021-06-01 RX ORDER — ASPIRIN 81 MG/1
81 TABLET ORAL DAILY
Status: DISCONTINUED | OUTPATIENT
Start: 2021-06-02 | End: 2021-06-02 | Stop reason: HOSPADM

## 2021-06-01 RX ORDER — NALOXONE HYDROCHLORIDE 0.4 MG/ML
0.4 INJECTION, SOLUTION INTRAMUSCULAR; INTRAVENOUS; SUBCUTANEOUS
Status: DISCONTINUED | OUTPATIENT
Start: 2021-06-01 | End: 2021-06-02 | Stop reason: HOSPADM

## 2021-06-01 RX ORDER — OXYCODONE HYDROCHLORIDE 10 MG/1
10 TABLET ORAL EVERY 4 HOURS PRN
Status: DISCONTINUED | OUTPATIENT
Start: 2021-06-01 | End: 2021-06-02 | Stop reason: HOSPADM

## 2021-06-01 RX ORDER — ARGATROBAN 1 MG/ML
350 INJECTION, SOLUTION INTRAVENOUS
Status: DISCONTINUED | OUTPATIENT
Start: 2021-06-01 | End: 2021-06-01 | Stop reason: HOSPADM

## 2021-06-01 RX ORDER — ARGATROBAN 1 MG/ML
150 INJECTION, SOLUTION INTRAVENOUS
Status: DISCONTINUED | OUTPATIENT
Start: 2021-06-01 | End: 2021-06-01 | Stop reason: HOSPADM

## 2021-06-01 RX ORDER — TIROFIBAN HYDROCHLORIDE 50 UG/ML
0.07 INJECTION INTRAVENOUS CONTINUOUS PRN
Status: DISCONTINUED | OUTPATIENT
Start: 2021-06-01 | End: 2021-06-01 | Stop reason: HOSPADM

## 2021-06-01 RX ORDER — DOPAMINE HYDROCHLORIDE 160 MG/100ML
2-20 INJECTION, SOLUTION INTRAVENOUS CONTINUOUS PRN
Status: DISCONTINUED | OUTPATIENT
Start: 2021-06-01 | End: 2021-06-01 | Stop reason: HOSPADM

## 2021-06-01 RX ORDER — ONDANSETRON 2 MG/ML
4 INJECTION INTRAMUSCULAR; INTRAVENOUS EVERY 6 HOURS PRN
Status: DISCONTINUED | OUTPATIENT
Start: 2021-06-01 | End: 2021-06-02 | Stop reason: HOSPADM

## 2021-06-01 RX ORDER — EPTIFIBATIDE 2 MG/ML
2 INJECTION, SOLUTION INTRAVENOUS CONTINUOUS PRN
Status: DISCONTINUED | OUTPATIENT
Start: 2021-06-01 | End: 2021-06-01 | Stop reason: HOSPADM

## 2021-06-01 RX ORDER — HYDRALAZINE HYDROCHLORIDE 20 MG/ML
10 INJECTION INTRAMUSCULAR; INTRAVENOUS EVERY 4 HOURS PRN
Status: DISCONTINUED | OUTPATIENT
Start: 2021-06-01 | End: 2021-06-02 | Stop reason: HOSPADM

## 2021-06-01 RX ORDER — HEPARIN SODIUM 1000 [USP'U]/ML
INJECTION, SOLUTION INTRAVENOUS; SUBCUTANEOUS
Status: DISCONTINUED | OUTPATIENT
Start: 2021-06-01 | End: 2021-06-01 | Stop reason: HOSPADM

## 2021-06-01 RX ORDER — IOPAMIDOL 755 MG/ML
INJECTION, SOLUTION INTRAVASCULAR
Status: DISCONTINUED | OUTPATIENT
Start: 2021-06-01 | End: 2021-06-01 | Stop reason: HOSPADM

## 2021-06-01 RX ORDER — METOPROLOL TARTRATE 1 MG/ML
5-10 INJECTION, SOLUTION INTRAVENOUS
Status: DISCONTINUED | OUTPATIENT
Start: 2021-06-01 | End: 2021-06-02 | Stop reason: HOSPADM

## 2021-06-01 RX ORDER — FENTANYL CITRATE 50 UG/ML
INJECTION, SOLUTION INTRAMUSCULAR; INTRAVENOUS
Status: DISCONTINUED | OUTPATIENT
Start: 2021-06-01 | End: 2021-06-01 | Stop reason: HOSPADM

## 2021-06-01 RX ORDER — SODIUM CHLORIDE 9 MG/ML
INJECTION, SOLUTION INTRAVENOUS CONTINUOUS
Status: DISCONTINUED | OUTPATIENT
Start: 2021-06-01 | End: 2021-06-01 | Stop reason: HOSPADM

## 2021-06-01 RX ORDER — FLUMAZENIL 0.1 MG/ML
0.2 INJECTION, SOLUTION INTRAVENOUS
Status: DISCONTINUED | OUTPATIENT
Start: 2021-06-01 | End: 2021-06-02 | Stop reason: HOSPADM

## 2021-06-01 RX ORDER — EPTIFIBATIDE 2 MG/ML
1 INJECTION, SOLUTION INTRAVENOUS CONTINUOUS PRN
Status: DISCONTINUED | OUTPATIENT
Start: 2021-06-01 | End: 2021-06-01 | Stop reason: HOSPADM

## 2021-06-01 RX ORDER — ACETAMINOPHEN 325 MG/1
650 TABLET ORAL EVERY 4 HOURS PRN
Status: DISCONTINUED | OUTPATIENT
Start: 2021-06-01 | End: 2021-06-02 | Stop reason: HOSPADM

## 2021-06-01 RX ORDER — DOBUTAMINE HYDROCHLORIDE 200 MG/100ML
2-20 INJECTION INTRAVENOUS CONTINUOUS PRN
Status: DISCONTINUED | OUTPATIENT
Start: 2021-06-01 | End: 2021-06-01 | Stop reason: HOSPADM

## 2021-06-01 RX ORDER — FENTANYL CITRATE 50 UG/ML
25-50 INJECTION, SOLUTION INTRAMUSCULAR; INTRAVENOUS
Status: ACTIVE | OUTPATIENT
Start: 2021-06-01 | End: 2021-06-01

## 2021-06-01 RX ORDER — ASPIRIN 81 MG/1
324 TABLET, CHEWABLE ORAL DAILY
Status: DISCONTINUED | OUTPATIENT
Start: 2021-06-01 | End: 2021-06-01 | Stop reason: HOSPADM

## 2021-06-01 RX ORDER — ATROPINE SULFATE 0.1 MG/ML
0.5 INJECTION INTRAVENOUS
Status: DISCONTINUED | OUTPATIENT
Start: 2021-06-01 | End: 2021-06-02 | Stop reason: HOSPADM

## 2021-06-01 RX ADMIN — ASPIRIN 325 MG: 325 TABLET, COATED ORAL at 14:02

## 2021-06-01 RX ADMIN — SODIUM CHLORIDE: 9 INJECTION, SOLUTION INTRAVENOUS at 14:01

## 2021-06-01 ASSESSMENT — MIFFLIN-ST. JEOR: SCORE: 1090.45

## 2021-06-01 NOTE — PROGRESS NOTES
Research Consent Note:     Study Title: Adenosine Contrast Correlations in Evaluating Revascularization ACCELERATION Study  IRB # [id number]    PI pager: Dr. Mack Monroy  # [phone]   pager: Dalila Duarte RN  #  [phone]                       Estimated dates of participation: One year     Consent form was reviewed with the patient.  The purpose, risks, and benefits of study participation were discussed.  The study was reviewed with expected duration of participation, procedures, along with any foreseeable risks or discomforts. It was discussed that study participation is voluntary and that refusal to participate will involve no penalty or decrease benefits to which the subject is otherwise entitled, and the subject may discontinue participation at any time without penalty or loss of benefits. Patient questions were answered. Patient was able to state what study participation involved.  Patient signed the consent and HIPAA forms prior to study participation and was given signed copies of both.

## 2021-06-01 NOTE — PROGRESS NOTES
D/I/A: Pt roomed on 3C in bay so [place].  Arrived via litter and accompanied by RN on monitor.  VSSA.  Rhythm upon arrival SR on monitor.  Denies pain or sob.  Reviewed activity restrictions and when to notify RN, ie-changes to breathing or increased chest pressure or chest pain.  CCL access:  Pt had right groin access site which was angio sealed. Site currently  CDI, no bleeding no hematoma, PP+2. Pt also had left arm attempt at access with no sheath placed and dressing CDI.  P: Continue to monitor status.  Discharge to home once meeting criteria.

## 2021-06-01 NOTE — PROVIDER NOTIFICATION
"D/I./A: Pt c/o CP, \"5-6 when I breathe in\", EKG ordered upon arrival.  Baseball sized hematoma noted; manual pressure held and MD Goodwin came to see patient.  BP elevated at this time; will continue to monitor BP and admin PRN meds as needed once patient able to relax.  A+O x4 and making needs known.    P: Continue to monitor status.  "

## 2021-06-02 ENCOUNTER — TELEPHONE (OUTPATIENT)
Dept: CARDIOLOGY | Facility: CLINIC | Age: 86
End: 2021-06-02

## 2021-06-02 LAB
INTERPRETATION ECG - MUSE: NORMAL
KCT BLD-ACNC: 183 SEC (ref 75–150)
KCT BLD-ACNC: 239 SEC (ref 75–150)

## 2021-06-02 NOTE — TELEPHONE ENCOUNTER
Not until one year.    IF VERY SEVERE PAIN, THEN MAY BE WE CAN HOLD BRILINTA IN 6 MONTHS AND ALLOW FOR THAT.

## 2021-06-02 NOTE — PROGRESS NOTES
D/I/A:  Patient is tolerating liquids and foods, ambulating, urinating, puncture sites are stable ( no bleeding and no hematoma) and patient has a .  A+O x4 and making needs known.  CCL access sites C/D/I; no bleeding or hematoma; CMS intact.  VSSA.  SR on monitor.  IV access removed.  Education completed and outlined in AVS or handout: medications reviewed with patient.  Questions answered prior to discharge.  Belongings returned to patient at discharge.    P: Discharged to self care.  Patient to follow up with appts as per discharge instruction.      Pt did not receive hydralazine prior to leaving as ,medication was not available. Pt also has history of head aneurysm with coil and pt states MD's aware of BP and have not ordered any additional medications. Instructed to follow up with cardiology and neurology

## 2021-06-02 NOTE — PROGRESS NOTES
D/I/A: Pt found to have mod size soft hematoma. Pressure applied for 10-15 min and  Hematomas resolved. Pt's groin site ecchymotic. Pt reports less pain with palpitation of site. Pt continued on bedrest another 30 min and re evaluate.

## 2021-06-02 NOTE — TELEPHONE ENCOUNTER
Patient is doing well after PCI.  The only problem is that she is still have sciatic nerve pain and wants to know if she can receive an epidural injection.  Writer will discuss this with Dr. Willson and let her know.  Patient is on Brilinta and aspirin.    Shelley Garcia RN  Cardiology Care Coordinator  [facility]  [phone] option 1        Discharge follow up post PCI:      What does the site look like?  Review: Care of wrist / arm site/ femoral site  It is normal to have soreness and or bruising at the puncture site and mild tingling in your hand for up to 3 days. The site should be flat and dry.                        Wrist    For 2 days, do not use your hand or arm to support your weight (such as rising from a chair) or bend your wrist (such as lifting a garage door).                               For 2 days, do not lift more than 5 pounds or exercise your arm (tennis, golf or bowling).  Groin     No heavy lifting (10 pounds) for 5-7 days.        If you start bleeding from the site in your arm:  Sit down and press firmly on the site with your fingers for 10 minutes. Call your doctor as soon as you can.  If the bleeding stops, sit still and keep your wrist straight for 2 hours.  Do NOT take a bath, or use a hot tub or pool for at least 3 days. You may shower.     Call your doctor if:    You have a large or growing hard lump around the site.    The site is red, swollen, hot or tender.    Blood or fluid is draining from the site.    You have chills or a fever greater than 101 F (38 C).    Your leg or arm feels numb or cool.    You have hives, a rash or unusual itching.     Are you having any pain?  No-except for her sciatic nerve     How is your activity tolerance?    For 2 days, do NOT have sex or do any heavy exercise.  Do you have someone at home to assist you with your daily activities?      Review Medications: Dual antiplatelet therapy  If you have started taking Plavix do not stop  taking it until you talk to your heart doctor (cardiologist). triple anticoagulation  therapy for 30 days, then discontinue the aspirin  If you have stopped any other medicines, check with your nurse or provider about when to restart them.  Review Nitroglycerin instructions, take one tablet under the tongue for chest pain, if there is no relief take another one 5 minutes apart. If you still have no relief from the chest pain, call 911.                            Have you scheduled with Cardiac Rehab? no she was not aware and will think about it      FOLLOW UP  Do you have a follow up appointment with your provider? Rajwinder Mejia in 4 weeks      CONTACT INFORMATION  Please feel free to call us with any other questions or symptoms that are concerning for you at [phone] if it is after 4:30 in the afternoon, or a weekend please call [phone] and ask for the on call specialist.  We want to do everything we can to help prevent you needing to return to the ED, so please do not hesitate to call us.

## 2021-06-02 NOTE — PROGRESS NOTES
MD call    D/I/A: MD's called to evaluate right groin site and discuss extended bedrest. Site appears to have stopped swelling and has remained soft. Pt to remain on bedrest until 1000 and then sit up and re evaluate. ACT drawn 183 result. Pt medicated with 650 mg of tylenol for HA and will continue to monitor        Pt up at 2200 and ambulated to BR. Pt voided and ambulated around unit. Pt's right groin site soft and ecchymotic. No bleeding no hematoma. Pt denied any back pain . Dressing at groin site replaced to primpore, site remained CDI.

## 2021-06-02 NOTE — DISCHARGE INSTRUCTIONS
Going Home after an Angioplasty or Stent Placement (Cardiac)  ______________________________________________      After you go home:    Have an adult stay with you for 24 hours.    Drink plenty of fluids.    You may eat your normal diet, unless your doctor tells you otherwise.    For 24 hours:    Relax and take it easy.    Do NOT smoke.    Do NOT make any important or legal decisions.    Do NOT drive or operate machines at home or at work.    Do NOT drink alcohol.    Remove the Band-Aid after 24 hours. If there is minor oozing, apply another Band-aid and remove it after 12 hours.    For 2 days, do NOT have sex or do any heavy exercise.    Do NOT take a bath, or use a hot tub or pool for at least 3 days. You may shower.    Care of groin site  It is normal to have a small bruise or lump at the site.    Do not scrub the site.    For the first 2 days: Do not stoop or squat. When you cough, sneeze or move your bowels, hold your hand over the puncture site and press gently.    Do not lift more than 10 pounds for at least 3 to 5 days.    Do not use lotion or powder near the puncture site for 3 days.    If you start bleeding from the site in your groin, lie down flat and press firmly  on the site. Call your doctor as soon as you can.    Care of wrist or arm site  It is normal to have soreness at the puncture site and mild tingling in your hand for up to 3 days.    For 2 days, do not use your hand or arm to support your weight (such as rising from a chair) or bend your wrist (such as lifting a garage door).    For 2 days, do not lift more than 5 pounds or exercise your arm (tennis, golf or bowling).    If you start bleeding from the site in your arm:    Sit down and press firmly on the site with your fingers for 10 minutes. Call your doctor as soon as you can.    If the bleeding stops, sit still and keep your wrist straight for 2 hours.    Medicines    If you have started taking Plavix or Effient, do not stop taking it  until you talk to your heart doctor (cardiologist).    If you are on metformin (Glucophage), do not restart it until you have blood tests (within 2 to 3 days after discharge). When your doctor tells you it is safe, you may restart the metformin.    If you have stopped any other medicines, check with your nurse or provider about when to restart them.    Call 911 right away if you have bleeding that is heavy or does not stop.    Call your doctor if:    You have a large or growing hard lump around the site.    The site is red, swollen, hot or tender.    Blood or fluid is draining from the site.    You have chills or a fever greater than 101 F (38 C).    Your leg or arm feels numb or cool.    You have hives, a rash or unusual itching.      [facility] [place]:  [phone] (7 days a week)

## 2021-06-03 LAB — INTERPRETATION ECG - MUSE: NORMAL

## 2021-06-03 NOTE — CONFIDENTIAL NOTE
Spoke to patient and relayed MD's response.  Patient verbalized understanding.    Shelley Garcia, RN  Cardiology Care Coordinator  [facility]  [phone] option 1

## 2021-06-04 ENCOUNTER — TELEPHONE (OUTPATIENT)
Dept: CARDIOLOGY | Facility: CLINIC | Age: 86
End: 2021-06-04

## 2021-06-04 NOTE — TELEPHONE ENCOUNTER
Discharge follow up post PCI 6/2/21: TIFFANY to mid LCx and ostial and proximal RCA    What does the site look like?  Review: Care of wrist & femoral site  It is normal to have soreness and or bruising at the puncture site and mild tingling in your hand for up to 3 days. The site should be flat and dry.   Wrist    For 2 days, do not use your hand or arm to support your weight (such as rising from a chair) or bend your wrist (such as lifting a garage door).         For 2 days, do not lift more than 5 pounds or exercise your arm (tennis, golf or bowling).   Wrist is very bruised and swollen, they were unable to access the site  Groin     No heavy lifting (10 pounds) for 5-7 days.  Groin also very bruised but flat    Call your doctor if:    You have a large or growing hard lump around the site.    The site is red, swollen, hot or tender.    Blood or fluid is draining from the site.    You have chills or a fever greater than 101 F (38 C).    Your leg or arm feels numb or cool.    You have hives, a rash or unusual itching.  reviewed    Are you having any pain? Arm is sore    How is your activity tolerance?    For 2 days, do NOT have sex or do any heavy exercise.  Do you have someone at home to assist you with your daily activities?  Reviewed restrictions, she lives at home with .     Review Medications: Dual antiplatelet therapy  If you have started taking Brilinta do not stop taking it until you talk to your heart doctor (cardiologist). Dual antiplatelet therapy for at least a year. C/o shortness of breath  If you have stopped any other medicines, check with your nurse or provider about when to restart them.  Review Nitroglycerin instructions, take one tablet under the tongue for chest pain, if there is no relief take another one 5 minutes apart. If you still have no relief from the chest pain, call 911.    Have you scheduled with Cardiac Rehab? Waiting for her sciatic pain to get better      FOLLOW UP  Do you have a  follow up appointment with your provider?  no  What other discharge instructions do you have?     Are you to get labs, procedures or tests before you see your provider?      You are scheduled to see Rajwinder MIRZA 6/29 @ 1:30 pm        CONTACT INFORMATION  Please feel free to call us with any other questions or symptoms that are concerning for you at [phone] if it is after 4:30 in the afternoon, or a weekend please call [phone] and ask for the on call specialist.  We want to do everything we can to help prevent you needing to return to the ED, so please do not hesitate to call us.

## 2021-06-28 NOTE — PROGRESS NOTES
"  [facility] - [facility]  Cardiovascular Clinic Note      Referring Provider: No ref. provider found   Primary Care Provider: Jocelin Guajardo     Patient Name: Brenna Watkins   MRN: [id number]       History of Present Illness:  Brenna Watkins is a 86 year old female with PMH notable for HTN, CAD, GERD, and hx cerebral aneurysm. She presents to clinic for PCI follow up with complaints of SOB.    The patient was last seen 4/2021 with Dr. Willson.  She complained of BEARDEN that had been progressing for a period of year. A coronary CT was pursued.  This showed diffuse CAD with possible obstructive CAD.  As such, coronary angiogram was completed 6/1/2021. This showed obstructive CAD of the RCA and LCx.  She underwent TIFFANY x1 to both areas. She was discharged the same day. Since that point in time, the patient has continued to experience shortness of breath.  She notes that her exertional shortness of breath has somewhat improved, but notes that she feels she \"regularly\" needs to catch her breath by taking a deep inspiration at rest.    She denies chest pain.  She has been somewhat resistant to cardiac rehab as she is concerned her sciatica will \"flare.\"  She denies orthopnea, worsened LE edema (has baseline swelling secondary to varicose veins), palpitations and dizziness/lightheadedness.  The right femoral artery was utilized for her coronary angiogram - she denies pain, swelling, significant ecchymosis at this site.  She does not regularly check her BP at home.    Pertinent Cardiac Medications:  90mg Brilinta BID  81mg ASA daily  20mg Lipitor daily  25mg metoprolol succinate daily      Past Medical History:  Pertinent past medical history as above.      Past Surgical History:  Past Surgical History:   Procedure Laterality Date     APPENDECTOMY       AS REVISE ULNAR NERVE AT ELBOW  2018     BACK SURGERY  2015     CARPAL TUNNEL RELEASE RT/LT Right      CRANIOTOMY, REPAIR ANEURYSM, COMBINED       CV CORONARY " ANGIOGRAM N/A 6/1/2021    Procedure: CV CORONARY ANGIOGRAM;  Surgeon: Syed Zayas MD;  Location: [facility] CARDIAC CATH LAB     CV PCI STENT DRUG ELUTING N/A 6/1/2021    Procedure: Percutaneous Coronary Intervention Stent Drug Eluting;  Surgeon: Syed Zayas MD;  Location: [facility] CARDIAC CATH LAB     IR CAROTID ANGIOGRAM  1/26/2012     IR CAROTID ANGIOGRAM  1/26/2012     IR MISCELLANEOUS PROCEDURE  1/26/2012     STRIP VEIN       TOE SURGERY  03/2018         Family History:  Family History   Problem Relation Age of Onset     Sudden Death Mother         d. childbirth      Alcoholism Father 62     Myocardial Infarction Brother 68     Alzheimer Disease Sister      Diabetes No family hx of      Cancer No family hx of          Current Medications:  Current Outpatient Medications   Medication Sig Dispense Refill     aspirin (ASA) 81 MG chewable tablet Take 1 tablet (81 mg) by mouth daily 90 tablet 3     atorvastatin (LIPITOR) 20 MG tablet Take 1 tablet (20 mg) by mouth daily 90 tablet 3     clopidogrel (PLAVIX) 75 MG tablet Please take 300mg the first day, then 75mg every day thereafter. 90 tablet 3     diphenhydrAMINE-acetaminophen (TYLENOL PM)  MG tablet Take 2 tablets by mouth nightly as needed for sleep       metoprolol succinate ER (TOPROL-XL) 25 MG 24 hr tablet Take 1 tablet (25 mg) by mouth daily 90 tablet 3     multivitamin w/minerals (MULTI-VITAMIN) tablet Take 1 tablet by mouth daily       Omeprazole (PRILOSEC PO) Take by mouth daily           Social History:  Lives in assisted living.   has dementia    Physical Exam:  /81 (BP Location: Right arm, Patient Position: Chair, Cuff Size: Adult Regular)   Pulse 83   Wt 70.3 kg (155 lb)   SpO2 97%   BMI 27.46 kg/m    Body mass index is 27.46 kg/m .  Wt Readings from Last 2 Encounters:   06/29/21 70.3 kg (155 lb)   06/01/21 67.1 kg (148 lb)     Constitutional: no acute distress, pleasant and cooperative,  appears overall well.  Eyes: sclera white, conjunctiva clear, without icterus or pallor   Cardiovascular: RRR. Normal S1/S2. JVP not elevated. LE with dependent edema   Respiratory: clear to auscultation bilaterally  Gastrointestinal: soft, nontender, non distended  Musculoskeletal: normal muscle bulk and tone, joints   Skin: normal skin appearance without worrisome lesions.   Neurologic: AOx3, gait smooth and symmetric  Psychiatric: appropriate affect, eye contact, intact thought and speech  Right Groin: angiogram access site clean, dry, intact. No ecchymosis/hematoma      Laboratory Data:  LIVER ENZYME RESULTS:  Recent Labs   Lab Test 03/01/21  1714   AST 16   ALT 22       CBC RESULTS:  Recent Labs   Lab Test 06/01/21  1240 03/01/21  1714   WBC 7.8 6.0   HGB 14.0 13.9   HCT 44.0 43.7    273       BMP RESULTS:  Recent Labs   Lab Test 06/01/21  1240 03/01/21  1714    137   POTASSIUM 4.2 4.4   CHLORIDE 106 105   CO2 29 30   ANIONGAP 3 2*   GLC 88 80   BUN 21 19   CR 0.92 0.78   BAUTISTA 9.2 9.6         Pertinent Procedures/Imaging:  Coronary Angiogram 6/1/2021:  2 vessel severe CAD involving the RCA and LCx. Otherwise mild non obstructive CAD with severely tortuous coronaries.  PCI with two drug eluting stents (LCx and RCA).    Echocardiogram 5/10/2021  Global and regional left ventricular function is normal with an EF of 60-65%.  Right ventricular function, chamber size, wall motion, and thickness are  normal.  Both atria appear normal.  Pulmonary artery systolic pressure is normal.  The inferior vena cava is normal.  No pericardial effusion is present.    Assessment:  Brenna Watkins is a 86 year old female with PMH notable for HTN, CAD, GERD, and hx cerebral aneurysm. She presents to clinic for PCI follow up with complaints of SOB.    86 year old female s/p PCI x2.  Somewhat improved exertional shortness of breath, but now notes shortness of breath.  Concerned this may be secondary to Brilinta use.  Otherwise, recovering without complication. Doubt in-stent issues given that she has no chest pain.    Plan:  # CAD s/p PCI to RCA, LCx 6/2021  # persistent SOB  - cardiac rehab referral  - [facility] today for assessment of renal function s/p angiogram  - goal directed medical therapy   - DAPT: 81mg ASA daily.  Start Plavix - 300mg 6/30 followed by 75mg daily starting 7/1. Take last dose of Brilinta this evening.  12 months uninterrupted   - Statin: lipid panel, pending results, may increase statin.  For now continue 20mg Lipitor daily    - BB: metoprolol succinate   - ACE: not indicated, no MI. Normal LVEF  - patient will call our clinic if no improvement to SOB 2 weeks s/p switch to Plavix. Phone number provided.    # HTN, HLD  BP in clinic controlled  - continue BB  - lipid panel today      Follow-up: 3 months with MD.  Will follow up labs via [facility]    A total of 19 minutes spent face to face with patient. An additional 10 minutes was spent preforming documentation, coordination of care, and chart review.    Rajwinder Mejia PA-C  Interventional/Critical Care Cardiology  [phone]        CC  Patient Care Team:  Jocelin Guajardo MD as PCP - General (Family Practice)  Jocelin Guajardo MD as Assigned PCP  Jordi Singer MD as MD (Orthopedics)  Ozzy Freeman MD as Assigned Surgical Provider  Gil Willson MD as Assigned Heart and Vascular Provider

## 2021-06-29 ENCOUNTER — OFFICE VISIT (OUTPATIENT)
Dept: CARDIOLOGY | Facility: CLINIC | Age: 86
End: 2021-06-29
Payer: COMMERCIAL

## 2021-06-29 VITALS
WEIGHT: 155 LBS | OXYGEN SATURATION: 97 % | DIASTOLIC BLOOD PRESSURE: 81 MMHG | HEART RATE: 83 BPM | BODY MASS INDEX: 27.46 KG/M2 | SYSTOLIC BLOOD PRESSURE: 128 MMHG

## 2021-06-29 DIAGNOSIS — I10 HYPERTENSION GOAL BP (BLOOD PRESSURE) < 140/90: ICD-10-CM

## 2021-06-29 DIAGNOSIS — R06.09 DOE (DYSPNEA ON EXERTION): ICD-10-CM

## 2021-06-29 DIAGNOSIS — I25.118 CORONARY ARTERY DISEASE OF NATIVE ARTERY OF NATIVE HEART WITH STABLE ANGINA PECTORIS (H): Primary | ICD-10-CM

## 2021-06-29 PROCEDURE — 99214 OFFICE O/P EST MOD 30 MIN: CPT | Performed by: PHYSICIAN ASSISTANT

## 2021-06-29 RX ORDER — CLOPIDOGREL BISULFATE 75 MG/1
TABLET ORAL
Qty: 90 TABLET | Refills: 3 | Status: SHIPPED | OUTPATIENT
Start: 2021-06-29 | End: 2022-04-05

## 2021-06-29 NOTE — LETTER
"6/29/2021      RE: Brenna Watkins  [address] [address]       Dear Colleague,    Thank you for the opportunity to participate in the care of your patient, Brenna Watkins, at the [facility] HEART [facility]. Please see a copy of my visit note below.      [facility] - [facility]  Cardiovascular Clinic Note      Referring Provider: No ref. provider found   Primary Care Provider: Jocelin Guajardo     Patient Name: Brenna Watkins   MRN: [id number]       History of Present Illness:  Brenna Watkins is a 86 year old female with PMH notable for HTN, CAD, GERD, and hx cerebral aneurysm. She presents to clinic for PCI follow up with complaints of SOB.    The patient was last seen 4/2021 with Dr. Willson.  She complained of BEARDEN that had been progressing for a period of year. A coronary CT was pursued.  This showed diffuse CAD with possible obstructive CAD.  As such, coronary angiogram was completed 6/1/2021. This showed obstructive CAD of the RCA and LCx.  She underwent TIFFANY x1 to both areas. She was discharged the same day. Since that point in time, the patient has continued to experience shortness of breath.  She notes that her exertional shortness of breath has somewhat improved, but notes that she feels she \"regularly\" needs to catch her breath by taking a deep inspiration at rest.    She denies chest pain.  She has been somewhat resistant to cardiac rehab as she is concerned her sciatica will \"flare.\"  She denies orthopnea, worsened LE edema (has baseline swelling secondary to varicose veins), palpitations and dizziness/lightheadedness.  The right femoral artery was utilized for her coronary angiogram - she denies pain, swelling, significant ecchymosis at this site.  She does not regularly check her BP at home.    Pertinent Cardiac Medications:  90mg Brilinta BID  81mg ASA daily  20mg Lipitor daily  25mg metoprolol " succinate daily      Past Medical History:  Pertinent past medical history as above.      Past Surgical History:  Past Surgical History:   Procedure Laterality Date     APPENDECTOMY       AS REVISE ULNAR NERVE AT ELBOW  2018     BACK SURGERY  2015     CARPAL TUNNEL RELEASE RT/LT Right      CRANIOTOMY, REPAIR ANEURYSM, COMBINED       CV CORONARY ANGIOGRAM N/A 6/1/2021    Procedure: CV CORONARY ANGIOGRAM;  Surgeon: Syed Zayas MD;  Location:  HEART CARDIAC CATH LAB     CV PCI STENT DRUG ELUTING N/A 6/1/2021    Procedure: Percutaneous Coronary Intervention Stent Drug Eluting;  Surgeon: Syed Zayas MD;  Location:  HEART CARDIAC CATH LAB     IR CAROTID ANGIOGRAM  1/26/2012     IR CAROTID ANGIOGRAM  1/26/2012     IR MISCELLANEOUS PROCEDURE  1/26/2012     STRIP VEIN       TOE SURGERY  03/2018         Family History:  Family History   Problem Relation Age of Onset     Sudden Death Mother         d. childbirth      Alcoholism Father 62     Myocardial Infarction Brother 68     Alzheimer Disease Sister      Diabetes No family hx of      Cancer No family hx of          Current Medications:  Current Outpatient Medications   Medication Sig Dispense Refill     aspirin (ASA) 81 MG chewable tablet Take 1 tablet (81 mg) by mouth daily 90 tablet 3     atorvastatin (LIPITOR) 20 MG tablet Take 1 tablet (20 mg) by mouth daily 90 tablet 3     clopidogrel (PLAVIX) 75 MG tablet Please take 300mg the first day, then 75mg every day thereafter. 90 tablet 3     diphenhydrAMINE-acetaminophen (TYLENOL PM)  MG tablet Take 2 tablets by mouth nightly as needed for sleep       metoprolol succinate ER (TOPROL-XL) 25 MG 24 hr tablet Take 1 tablet (25 mg) by mouth daily 90 tablet 3     multivitamin w/minerals (MULTI-VITAMIN) tablet Take 1 tablet by mouth daily       Omeprazole (PRILOSEC PO) Take by mouth daily           Social History:  Lives in assisted living.   has dementia    Physical Exam:  BP  128/81 (BP Location: Right arm, Patient Position: Chair, Cuff Size: Adult Regular)   Pulse 83   Wt 70.3 kg (155 lb)   SpO2 97%   BMI 27.46 kg/m    Body mass index is 27.46 kg/m .  Wt Readings from Last 2 Encounters:   06/29/21 70.3 kg (155 lb)   06/01/21 67.1 kg (148 lb)     Constitutional: no acute distress, pleasant and cooperative, appears overall well.  Eyes: sclera white, conjunctiva clear, without icterus or pallor   Cardiovascular: RRR. Normal S1/S2. JVP not elevated. LE with dependent edema   Respiratory: clear to auscultation bilaterally  Gastrointestinal: soft, nontender, non distended  Musculoskeletal: normal muscle bulk and tone, joints   Skin: normal skin appearance without worrisome lesions.   Neurologic: AOx3, gait smooth and symmetric  Psychiatric: appropriate affect, eye contact, intact thought and speech  Right Groin: angiogram access site clean, dry, intact. No ecchymosis/hematoma      Laboratory Data:  LIVER ENZYME RESULTS:  Recent Labs   Lab Test 03/01/21  1714   AST 16   ALT 22       CBC RESULTS:  Recent Labs   Lab Test 06/01/21  1240 03/01/21  1714   WBC 7.8 6.0   HGB 14.0 13.9   HCT 44.0 43.7    273       BMP RESULTS:  Recent Labs   Lab Test 06/01/21  1240 03/01/21  1714    137   POTASSIUM 4.2 4.4   CHLORIDE 106 105   CO2 29 30   ANIONGAP 3 2*   GLC 88 80   BUN 21 19   CR 0.92 0.78   BAUTISTA 9.2 9.6         Pertinent Procedures/Imaging:  Coronary Angiogram 6/1/2021:  2 vessel severe CAD involving the RCA and LCx. Otherwise mild non obstructive CAD with severely tortuous coronaries.  PCI with two drug eluting stents (LCx and RCA).    Echocardiogram 5/10/2021  Global and regional left ventricular function is normal with an EF of 60-65%.  Right ventricular function, chamber size, wall motion, and thickness are  normal.  Both atria appear normal.  Pulmonary artery systolic pressure is normal.  The inferior vena cava is normal.  No pericardial effusion is  present.    Assessment:  Brenna Watkins is a 86 year old female with PMH notable for HTN, CAD, GERD, and hx cerebral aneurysm. She presents to clinic for PCI follow up with complaints of SOB.    86 year old female s/p PCI x2.  Somewhat improved exertional shortness of breath, but now notes shortness of breath.  Concerned this may be secondary to Brilinta use. Otherwise, recovering without complication. Doubt in-stent issues given that she has no chest pain.    Plan:  # CAD s/p PCI to RCA, LCx 6/2021  # persistent SOB  - cardiac rehab referral  - [facility] today for assessment of renal function s/p angiogram  - goal directed medical therapy   - DAPT: 81mg ASA daily.  Start Plavix - 300mg 6/30 followed by 75mg daily starting 7/1. Take last dose of Brilinta this evening.  12 months uninterrupted   - Statin: lipid panel, pending results, may increase statin.  For now continue 20mg Lipitor daily    - BB: metoprolol succinate   - ACE: not indicated, no MI. Normal LVEF  - patient will call our clinic if no improvement to SOB 2 weeks s/p switch to Plavix. Phone number provided.    # HTN, HLD  BP in clinic controlled  - continue BB  - lipid panel today      Follow-up: 3 months with MD.  Will follow up labs via MDLIVEt    A total of 19 minutes spent face to face with patient. An additional 10 minutes was spent preforming documentation, coordination of care, and chart review.    Rajwinder Mejia PA-C  Interventional/Critical Care Cardiology  [phone]        CC  Patient Care Team:  Jocelin Guajardo MD as PCP - General (Family Practice)  Jordi Singer MD as MD (Orthopedics)  Ozzy Freeman MD as Assigned Surgical Provider  Gil Willson MD as Assigned Heart and Vascular Provider        Please do not hesitate to contact me if y

## 2021-06-29 NOTE — PATIENT INSTRUCTIONS
Thank you for coming to the [facility] @ [facility]; please note the following instructions:    1. Labs today  2. Take Brilinta tonight  3. Tomorrow morning, take 300mg of plavix (this is 4 tablets) in the morning. Stop taking the brilinta  4. Starting on Thursday, take 75mg of plavix (1 pill per day) once per day.    5. Continue all of your other medications  6. Consider cardiac rehab  7. Please call our clinic in approximately 2 weeks if your shortness of breath does not improve  8. Follow up with Dr. Willson in 3 months        If you have any questions regarding your visit please contact your care team:     Cardiology  Telephone Number   Shelley WILSON., RN  Lisbet ZARATE, RN   Helena LARA, RENE RUBIO, RENE TAYLOR, LPN   [phone] (option 1)   For scheduling appts:     [phone] (select option 1)       For the Device Clinic (Pacemakers and ICD's)  RN's :  Stella Campbell   During business hours: [phone]    *After business hours:  [phone] (select option 4)      Normal test result notifications will be released via [employer] or mailed within 7 business days.  All other test results, will be communicated via telephone once reviewed by your cardiologist.    If you need a medication refill please contact your pharmacy.  Please allow 3 business days for your refill to be completed.    As always, thank you for trusting us with your health care needs!    Patient Education     Clopidogrel Bisulfate Oral tablet  What is this medicine?  CLOPIDOGREL (kloh PID oh grel) helps to prevent blood clots. This medicine is used to prevent heart attack, stroke, or other vascular events in people who are at high risk.  This medicine may be used for other purposes; ask your health care provider or pharmacist if you have questions.  What should I tell my health care provider before I take this medicine?  They need to know if you have any of the following conditions:    bleeding disorder    bleeding in the  brain    planned surgery    stomach or intestinal ulcers    stroke or transient ischemic attack    an unusual or allergic reaction to clopidogrel, other medicines, foods, dyes, or preservatives    pregnant or trying to get pregnant    breast-feeding  How should I use this medicine?  Take this medicine by mouth with a drink of water. Follow the directions on the prescription label. You may take this medicine with or without food. Take your medicine at regular intervals. Do not take your medicine more often than directed.  Talk to your pediatrician regarding the use of this medicine in children. Special care may be needed.  Overdosage: If you think you have taken too much of this medicine contact a poison control center or emergency room at once.  NOTE: This medicine is only for you. Do not share this medicine with others.  What if I miss a dose?  If you miss a dose, take it as soon as you can. If it is almost time for your next dose, take only that dose. Do not take double or extra doses.  What may interact with this medicine?    aspirin    blood thinners like cilostazol, enoxaparin, ticlopidine, and warfarin    certain medicines for depression like citalopram, fluoxetine, and fluvoxamine    certain medicines for fungal infections like ketoconazole, fluconazole, and voriconazole    certain medicines for HIV infection like delavirdine, efavirenz, and etravirine    certain medicines for seizures like felbamate, oxcarbazepine, and phenytoin    chloramphenicol    fluvastatin    isoniazid, INH    medicines for inflammation like ibuprofen and naproxen    modafinil    nicardipine    over-the counter supplements like echinacea, feverfew, fish oil, garlic, clary, ginkgo, green tea, horse chestnut    quinine    stomach acid blockers like cimetidine, omeprazole, and esomeprazole    tamoxifen    tolbutamide    topiramate    torsemide  This list may not describe all possible interactions. Give your health care provider a list of  all the medicines, herbs, non-prescription drugs, or dietary supplements you use. Also tell them if you smoke, drink alcohol, or use illegal drugs. Some items may interact with your medicine.  What should I watch for while using this medicine?  Visit your doctor or health care professional for regular check ups. Do not stop taking your medicine unless your doctor tells you to.  Notify your doctor or health care professional and seek emergency treatment if you develop breathing problems; changes in vision; chest pain; severe, sudden headache; pain, swelling, warmth in the leg; trouble speaking; sudden numbness or weakness of the face, arm or leg. These can be signs that your condition has gotten worse.  If you are going to have surgery or dental work, tell your doctor or health care professional that you are taking this medicine.  Certain genetic factors may reduce the effect of this medicine. Your doctor may use genetic tests to determine treatment.  What side effects may I notice from receiving this medicine?  Side effects that you should report to your doctor or health care professional as soon as possible:    allergic reactions like skin rash, itching or hives, swelling of the face, lips, or tongue    breathing problems    changes in vision    fever    signs and symptoms of bleeding such as bloody or black, tarry stools; red or dark-brown urine; spitting up blood or brown material that looks like coffee grounds; red spots on the skin; unusual bruising or bleeding from the eye, gums, or nose    sudden weakness    unusual bleeding or bruising  Side effects that usually do not require medical attention (report to your doctor or health care professional if they continue or are bothersome):    constipation or diarrhea    headache    pain in back or joints    stomach upset  This list may not describe all possible side effects. Call your doctor for medical advice about side effects. You may report side effects to FDA at  1-800-FDA-1088.  Where should I keep my medicine?  Keep out of the reach of children.  Store at room temperature of 59 to 86 degrees F (15 to 30 degrees C). Throw away any unused medicine after the expiration date.  NOTE:This sheet is a summary. It may not cover all possible information. If you have questions about this medicine, talk to your doctor, pharmacist, or health care provider. Copyright  2016 Gold Standard

## 2021-06-29 NOTE — NURSING NOTE
"Chief Complaint   Patient presents with     RECHECK     S/P angiogram. Discuss Brilinta.        Initial /81 (BP Location: Right arm, Patient Position: Chair, Cuff Size: Adult Regular)   Pulse 83   Wt 70.3 kg (155 lb)   SpO2 97%   BMI 27.46 kg/m   Estimated body mass index is 27.46 kg/m  as calculated from the following:    Height as of 6/1/21: 1.6 m (5' 3\").    Weight as of this encounter: 70.3 kg (155 lb)..  BP completed using cuff size: regular    Chelsie Davidson MA  "

## 2021-06-30 PROBLEM — Z13.6 CARDIOVASCULAR SCREENING; LDL GOAL LESS THAN 160: Status: RESOLVED | Noted: 2017-06-13 | Resolved: 2021-06-30

## 2021-06-30 PROBLEM — R06.09 DOE (DYSPNEA ON EXERTION): Status: RESOLVED | Noted: 2021-05-13 | Resolved: 2021-06-30

## 2021-06-30 PROBLEM — R94.39 ABNORMAL RESULT OF OTHER CARDIOVASCULAR FUNCTION STUDY: Status: RESOLVED | Noted: 2021-05-13 | Resolved: 2021-06-30

## 2021-06-30 PROBLEM — Z98.890 STATUS POST CORONARY ANGIOGRAM: Status: RESOLVED | Noted: 2021-06-01 | Resolved: 2021-06-30

## 2021-07-10 ENCOUNTER — HEALTH MAINTENANCE LETTER (OUTPATIENT)
Age: 86
End: 2021-07-10

## 2021-07-21 ENCOUNTER — RECORDS - HEALTHEAST (OUTPATIENT)
Dept: ADMINISTRATIVE | Facility: CLINIC | Age: 86
End: 2021-07-21

## 2021-12-07 ENCOUNTER — OFFICE VISIT (OUTPATIENT)
Dept: CARDIOLOGY | Facility: CLINIC | Age: 86
End: 2021-12-07
Attending: PHYSICIAN ASSISTANT
Payer: COMMERCIAL

## 2021-12-07 VITALS
DIASTOLIC BLOOD PRESSURE: 83 MMHG | WEIGHT: 154 LBS | SYSTOLIC BLOOD PRESSURE: 149 MMHG | BODY MASS INDEX: 27.28 KG/M2 | HEART RATE: 79 BPM | OXYGEN SATURATION: 95 %

## 2021-12-07 DIAGNOSIS — I25.10 CORONARY ARTERY DISEASE INVOLVING NATIVE CORONARY ARTERY OF NATIVE HEART WITHOUT ANGINA PECTORIS: Primary | ICD-10-CM

## 2021-12-07 DIAGNOSIS — R06.09 DOE (DYSPNEA ON EXERTION): ICD-10-CM

## 2021-12-07 DIAGNOSIS — Z95.5 S/P RIGHT CORONARY ARTERY (RCA) STENT PLACEMENT: ICD-10-CM

## 2021-12-07 PROCEDURE — 99215 OFFICE O/P EST HI 40 MIN: CPT | Performed by: INTERNAL MEDICINE

## 2021-12-07 NOTE — PATIENT INSTRUCTIONS
Thank you for coming to the [facility] @ [facility]; please note the following instructions:    1. MEDICATION CHANGES TODAY:    * STOP Metoprolol  STOP plavix end of December , may have spine injection  one week after plavix stopped     2.Log Blood pressure readings and update family Doctor     3.Dr. Gil Willson is recommending to see you on an as needed basis regarding your heart; please follow up with your primary care provider.  It was a pleasuring seeing you today at the [facility] Care @ the [facility].        If you have any questions regarding your visit please contact your care team:     Cardiology  Telephone Number   Shelley TOLLIVER, RN  Lisbet ZARATE,RN  Helena LARA, RENE RUBIO, MA  Arlette TAYLOR, NAYELYN   [phone]   (select option 1)    *After hours: [phone]     For scheduling appts:     [phone] or    [phone] (select option 1)    *After hours: [phone]     For the Device Clinic (Pacemakers and ICD's)  RN's :  Stella Campbell   During business hours: [phone]    *After business hours:  [phone] (select option 4)      Normal test result notifications will be released via [email or web address] or mailed within 7 business days.  All other test results, will be communicated via telephone once reviewed by your cardiologist.    If you need a medication refill please contact your pharmacy.  Please allow 3 business days for your refill to be completed.    As always, thank you for trusting us with your health care needs!

## 2021-12-07 NOTE — PROGRESS NOTES
Referring provider: Jocelin Guajardo MD    HPI: Ms. Brenna Watkins is a 86 year old  female with PMH significant for   -Hypertension (new diagnosis) currently not on treatment    Patient is being seen today at request of Dr. Guajardo for dyspnea on exertion which has been gradually worsening over the last few years.  Particularly over the last 1 year she is having difficulty to walk with her friends even level ground.  She feels short of breath climbing stairs. Denies chest pain, palpitations, dizziness, syncope or lower extremity edema.  She tells me that she sometimes wakes up at night with shortness of breath.  Patient was recommended lisinopril 5 mg for hypertension when she was last seen on 3/1/2021 by Dr. Guajardo.  Today she tells me that she has not received any prescription.      She had an extensive work-up for dyspnea on exertion in 2017 and 2018 with spirometry, chest CT PE and stress echocardiogram which were all were unremarkable.  Most recent lab tests including CBC, BMP and TSH were all normal.    She has no history of diabetes mellitus, hyperlipidemia, coronary artery disease or any cardiac arrhythmia.  Patient tells me that 4 of her brothers  of MI younger than 65 years old.    No prior history of cardiac disease.  Lifetime non-smoker.    Patient is currently on omeprazole but no other medications.    Stress echocardiogram in 2017 showed a structurally normal heart with no inducible ischemia.  EKG 2018 shows sinus rhythm otherwise normal significant change.    Medications, personal, family, and social history reviewed with patient and revised.    Interval history 2021:  Patient is being seen today for follow-up.  As you know when I first saw her in April of this year she was complaining of shortness of breath with exertion. A coronary CT was pursued.  This showed diffuse CAD with possible obstructive CAD.  As such, coronary angiogram was completed 2021. This showed obstructive  CAD of the RCA and LCx.  She underwent TIFFANY x1 to both areas. She was discharged the same day.   After the PCI she was seen in June of this year for follow-up.  At that time she continued to report shortness of breath.    Today patient tells me that she is having a lot of back pain and neck pain.  She has been waiting to get a spinal injection for back pain but since she had PCI we have postponed the procedure.  Continues to report dyspnea on exertion.  No change in the symptoms since PCI.  No chest pain, dizziness, syncope or lower extremity edema.    PAST MEDICAL HISTORY:  Past Medical History:   Diagnosis Date     Brain aneurysm      Bunion      CAD (coronary artery disease)      CTS (carpal tunnel syndrome)      GERD without esophagitis      Hyperlipidemia LDL goal <70      Hypertension goal BP (blood pressure) < 140/90 7/5/2017     Osteopenia        CURRENT MEDICATIONS:  Current Outpatient Medications   Medication Sig Dispense Refill     aspirin (ASA) 81 MG chewable tablet Take 1 tablet (81 mg) by mouth daily 90 tablet 3     atorvastatin (LIPITOR) 20 MG tablet Take 1 tablet (20 mg) by mouth daily 90 tablet 3     clopidogrel (PLAVIX) 75 MG tablet Please take 300mg the first day, then 75mg every day thereafter. 90 tablet 3     diphenhydrAMINE-acetaminophen (TYLENOL PM)  MG tablet Take 2 tablets by mouth nightly as needed for sleep       metoprolol succinate ER (TOPROL-XL) 25 MG 24 hr tablet Take 1 tablet (25 mg) by mouth daily 90 tablet 3     multivitamin w/minerals (MULTI-VITAMIN) tablet Take 1 tablet by mouth daily       Omeprazole (PRILOSEC PO) Take by mouth daily         PAST SURGICAL HISTORY:  Past Surgical History:   Procedure Laterality Date     APPENDECTOMY       AS REVISE ULNAR NERVE AT ELBOW  2018     BACK SURGERY  2015     CARPAL TUNNEL RELEASE RT/LT Right      CRANIOTOMY, REPAIR ANEURYSM, COMBINED       CV CORONARY ANGIOGRAM N/A 6/1/2021    Procedure: CV CORONARY ANGIOGRAM;  Surgeon: Marquez  Syed Friedman MD;  Location:  HEART CARDIAC CATH LAB     CV PCI STENT DRUG ELUTING N/A 6/1/2021    Procedure: Percutaneous Coronary Intervention Stent Drug Eluting;  Surgeon: Syed Zayas MD;  Location: [facility] CARDIAC CATH LAB     IR CAROTID ANGIOGRAM  1/26/2012     IR CAROTID ANGIOGRAM  1/26/2012     IR MISCELLANEOUS PROCEDURE  1/26/2012     STRIP VEIN       TOE SURGERY  03/2018       ALLERGIES:     Allergies   Allergen Reactions     Pcn [Penicillins]        FAMILY HISTORY:  Family History   Problem Relation Age of Onset     Sudden Death Mother         d. childbirth      Alcoholism Father 62     Myocardial Infarction Brother 68     Alzheimer Disease Sister      Diabetes No family hx of      Cancer No family hx of          SOCIAL HISTORY:  Social History     Tobacco Use     Smoking status: Never Smoker     Smokeless tobacco: Never Used   Substance Use Topics     Alcohol use: Yes     Alcohol/week: 1.0 - 2.0 standard drink     Types: 1 - 2 Glasses of wine per week     Drug use: No       ROS:   Constitutional: No fever, chills, or sweats. Weight stable.   Cardiovascular: As per HPI.       Exam:  BP (!) 150/82 (BP Location: Left arm, Patient Position: Sitting, Cuff Size: Adult Large)   Pulse 84   Wt 69.9 kg (154 lb)   SpO2 95%   BMI 27.28 kg/m    GENERAL APPEARANCE: alert and no distress  HEENT: no icterus, no central cyanosis  LYMPH/NECK: no adenopathy, no asymmetry, JVP not elevated.  RESPIRATORY: lungs clear to auscultation - no rales, rhonchi or wheezes, no use of accessory muscles, no retractions, respirations are unlabored, normal respiratory rate  CARDIOVASCULAR: regular rhythm, normal S1, S2, no S3 or S4 and no murmur, click or rub, precordium quiet with normal PMI.  GI: soft, non tender  EXTREMITIES: no edema, varicose veins noted+  NEURO: alert, normal speech,and affect  SKIN: no ecchymoses, no rashes     I have reviewed the labs and personally reviewed the imaging below and  made my comment in the assessment and plan.    Labs:  CBC RESULTS:   Lab Results   Component Value Date    WBC 7.8 06/01/2021    RBC 4.62 06/01/2021    HGB 14.0 06/01/2021    HCT 44.0 06/01/2021    MCV 95 06/01/2021    MCH 30.3 06/01/2021    MCHC 31.8 06/01/2021    RDW 14.2 06/01/2021     06/01/2021       BMP RESULTS:  Lab Results   Component Value Date     06/01/2021    POTASSIUM 4.2 06/01/2021    CHLORIDE 106 06/01/2021    CO2 29 06/01/2021    ANIONGAP 3 06/01/2021    GLC 88 06/01/2021    BUN 21 06/01/2021    CR 0.92 06/01/2021    GFRESTIMATED 57 (L) 06/01/2021    GFRESTBLACK 66 06/01/2021    BATUISTA 9.2 06/01/2021     Coronary angiogram 6/1/2021  2 vessel severe CAD involving the RCA and LCx. Otherwise mild non obstructive CAD with severely tortuous coronaries.  PCI with two drug eluting stents (LCx and RCA).    CT coronary angiogram 5/12/2021  1.  Diffuse coronary atherosclerosis, possibly with 3 vessel  obstructive CAD. Recommend invasive evaluation.  2.  Total Agatston score 249 placing the patient in the 59 percentile  when compared to age and gender matched control group.    Echocardiogram 5/10/2021  Global and regional left ventricular function is normal with an EF of 60-65%.  Right ventricular function, chamber size, wall motion, and thickness are  normal.  Both atria appear normal.  Pulmonary artery systolic pressure is normal.  The inferior vena cava is normal.  No pericardial effusion is present.    Spirometry 2017: Normal  Echocardiogram 2017  Normal exercise echocardiogram without evidence of inducible ischemia.  Sensitivity of the test is reduced since target heart rate was not achieved.  With stress, the left ventricular ejection fraction increased from 55-60% to  greater than 65% and the left ventricular size decreased appropriately.  There was no ECG evidence of ischemia. Heart rate response to exercise was  normal, with hypertensive BP response.  No subjective symptoms to suggest  ischemia.  No significant valve disease on screening doppler evaluation. The aortic root  and visualized ascending aorta are normal.    CT PE 2018: Lungs clear.  No evidence of pulmonary embolism.    EKG 2018 sinus rhythm otherwise unremarkable        Assessment and Plan:     #CAD S/P PCI with two drug eluting stents (LCx and RCA) on 6/1/2021  -Continues to report BEARDEN.  Unclear etiology.  Today I recommended her to see pulmonology however the patient wants to take care of his back and neck issues first.  -Recommend to stop Plavix end of this month then she can schedule her spinal injection.  -Recommend to stop metoprolol  -Continue atorvastatin and aspirin    #Hypertension  -Blood pressure is elevated in clinic today.  She is complaining a lot of pain which might be driving her high blood pressure.  Recommend to monitor BP at home particularly after she receives treatment for back and neck pain.  She will follow-up with her primary care physician.    Return to clinic as needed.    Total time spent today for this visit is 45 minutes including precharting, face-to-face clinic visit, review of labs/imaging and medical documentation.    Please donot hesitate to contact me if you have any questions or concerns. Again, thank you for allowing me to participate in the care of your patient.    Gil MARINO MD  [facility] Division of Cardiology  Pager [phone]

## 2021-12-07 NOTE — LETTER
2021      RE: Brenna Watkins  [address] [id number]       Dear Colleague,    Thank you for the opportunity to participate in the care of your patient, Brenna Watkins, at the [facility]. Please see a copy of my visit note below.    Referring provider: Jocelin Guajardo MD    HPI: Ms. Bernna Watkins is a 86 year old  female with PMH significant for   -Hypertension (new diagnosis) currently not on treatment    Patient is being seen today at request of Dr. Guajardo for dyspnea on exertion which has been gradually worsening over the last few years.  Particularly over the last 1 year she is having difficulty to walk with her friends even level ground.  She feels short of breath climbing stairs. Denies chest pain, palpitations, dizziness, syncope or lower extremity edema.  She tells me that she sometimes wakes up at night with shortness of breath.  Patient was recommended lisinopril 5 mg for hypertension when she was last seen on 3/1/2021 by Dr. Guajardo.  Today she tells me that she has not received any prescription.      She had an extensive work-up for dyspnea on exertion in 2017 and 2018 with spirometry, chest CT PE and stress echocardiogram which were all were unremarkable.  Most recent lab tests including CBC, BMP and TSH were all normal.    She has no history of diabetes mellitus, hyperlipidemia, coronary artery disease or any cardiac arrhythmia.  Patient tells me that 4 of her brothers  of MI younger than 65 years old.    No prior history of cardiac disease.  Lifetime non-smoker.    Patient is currently on omeprazole but no other medications.    Stress echocardiogram in 2017 showed a structurally normal heart with no inducible ischemia.  EKG 2018 shows sinus rhythm otherwise normal significant change.    Medications, personal, family, and social history reviewed with patient and  revised.    Interval history 12/7/2021:  Patient is being seen today for follow-up.  As you know when I first saw her in April of this year she was complaining of shortness of breath with exertion. A coronary CT was pursued.  This showed diffuse CAD with possible obstructive CAD.  As such, coronary angiogram was completed 6/1/2021. This showed obstructive CAD of the RCA and LCx.  She underwent TIFFANY x1 to both areas. She was discharged the same day.   After the PCI she was seen in June of this year for follow-up.  At that time she continued to report shortness of breath.    Today patient tells me that she is having a lot of back pain and neck pain.  She has been waiting to get a spinal injection for back pain but since she had PCI we have postponed the procedure.  Continues to report dyspnea on exertion.  No change in the symptoms since PCI.  No chest pain, dizziness, syncope or lower extremity edema.    PAST MEDICAL HISTORY:  Past Medical History:   Diagnosis Date     Brain aneurysm      Bunion      CAD (coronary artery disease)      CTS (carpal tunnel syndrome)      GERD without esophagitis      Hyperlipidemia LDL goal <70      Hypertension goal BP (blood pressure) < 140/90 7/5/2017     Osteopenia        CURRENT MEDICATIONS:  Current Outpatient Medications   Medication Sig Dispense Refill     aspirin (ASA) 81 MG chewable tablet Take 1 tablet (81 mg) by mouth daily 90 tablet 3     atorvastatin (LIPITOR) 20 MG tablet Take 1 tablet (20 mg) by mouth daily 90 tablet 3     clopidogrel (PLAVIX) 75 MG tablet Please take 300mg the first day, then 75mg every day thereafter. 90 tablet 3     diphenhydrAMINE-acetaminophen (TYLENOL PM)  MG tablet Take 2 tablets by mouth nightly as needed for sleep       metoprolol succinate ER (TOPROL-XL) 25 MG 24 hr tablet Take 1 tablet (25 mg) by mouth daily 90 tablet 3     multivitamin w/minerals (MULTI-VITAMIN) tablet Take 1 tablet by mouth daily       Omeprazole (PRILOSEC PO) Take by  mouth daily         PAST SURGICAL HISTORY:  Past Surgical History:   Procedure Laterality Date     APPENDECTOMY       AS REVISE ULNAR NERVE AT ELBOW  2018     BACK SURGERY  2015     CARPAL TUNNEL RELEASE RT/LT Right      CRANIOTOMY, REPAIR ANEURYSM, COMBINED       CV CORONARY ANGIOGRAM N/A 6/1/2021    Procedure: CV CORONARY ANGIOGRAM;  Surgeon: Syed Zayas MD;  Location:  HEART CARDIAC CATH LAB     CV PCI STENT DRUG ELUTING N/A 6/1/2021    Procedure: Percutaneous Coronary Intervention Stent Drug Eluting;  Surgeon: Syed Zayas MD;  Location: [facility] CARDIAC CATH LAB     IR CAROTID ANGIOGRAM  1/26/2012     IR CAROTID ANGIOGRAM  1/26/2012     IR MISCELLANEOUS PROCEDURE  1/26/2012     STRIP VEIN       TOE SURGERY  03/2018       ALLERGIES:     Allergies   Allergen Reactions     Pcn [Penicillins]        FAMILY HISTORY:  Family History   Problem Relation Age of Onset     Sudden Death Mother         d. childbirth      Alcoholism Father 62     Myocardial Infarction Brother 68     Alzheimer Disease Sister      Diabetes No family hx of      Cancer No family hx of          SOCIAL HISTORY:  Social History     Tobacco Use     Smoking status: Never Smoker     Smokeless tobacco: Never Used   Substance Use Topics     Alcohol use: Yes     Alcohol/week: 1.0 - 2.0 standard drink     Types: 1 - 2 Glasses of wine per week     Drug use: No       ROS:   Constitutional: No fever, chills, or sweats. Weight stable.   Cardiovascular: As per HPI.       Exam:  BP (!) 150/82 (BP Location: Left arm, Patient Position: Sitting, Cuff Size: Adult Large)   Pulse 84   Wt 69.9 kg (154 lb)   SpO2 95%   BMI 27.28 kg/m    GENERAL APPEARANCE: alert and no distress  HEENT: no icterus, no central cyanosis  LYMPH/NECK: no adenopathy, no asymmetry, JVP not elevated.  RESPIRATORY: lungs clear to auscultation - no rales, rhonchi or wheezes, no use of accessory muscles, no retractions, respirations are unlabored, normal  respiratory rate  CARDIOVASCULAR: regular rhythm, normal S1, S2, no S3 or S4 and no murmur, click or rub, precordium quiet with normal PMI.  GI: soft, non tender  EXTREMITIES: no edema, varicose veins noted+  NEURO: alert, normal speech,and affect  SKIN: no ecchymoses, no rashes     I have reviewed the labs and personally reviewed the imaging below and made my comment in the assessment and plan.    Labs:  CBC RESULTS:   Lab Results   Component Value Date    WBC 7.8 06/01/2021    RBC 4.62 06/01/2021    HGB 14.0 06/01/2021    HCT 44.0 06/01/2021    MCV 95 06/01/2021    MCH 30.3 06/01/2021    MCHC 31.8 06/01/2021    RDW 14.2 06/01/2021     06/01/2021       BMP RESULTS:  Lab Results   Component Value Date     06/01/2021    POTASSIUM 4.2 06/01/2021    CHLORIDE 106 06/01/2021    CO2 29 06/01/2021    ANIONGAP 3 06/01/2021    GLC 88 06/01/2021    BUN 21 06/01/2021    CR 0.92 06/01/2021    GFRESTIMATED 57 (L) 06/01/2021    GFRESTBLACK 66 06/01/2021    BAUTISTA 9.2 06/01/2021     Coronary angiogram 6/1/2021  2 vessel severe CAD involving the RCA and LCx. Otherwise mild non obstructive CAD with severely tortuous coronaries.  PCI with two drug eluting stents (LCx and RCA).    CT coronary angiogram 5/12/2021  1.  Diffuse coronary atherosclerosis, possibly with 3 vessel  obstructive CAD. Recommend invasive evaluation.  2.  Total Agatston score 249 placing the patient in the 59 percentile  when compared to age and gender matched control group.    Echocardiogram 5/10/2021  Global and regional left ventricular function is normal with an EF of 60-65%.  Right ventricular function, chamber size, wall motion, and thickness are  normal.  Both atria appear normal.  Pulmonary artery systolic pressure is normal.  The inferior vena cava is normal.  No pericardial effusion is present.    Spirometry 2017: Normal  Echocardiogram 2017  Normal exercise echocardiogram without evidence of inducible ischemia.  Sensitivity of the test is  reduced since target heart rate was not achieved.  With stress, the left ventricular ejection fraction increased from 55-60% to  greater than 65% and the left ventricular size decreased appropriately.  There was no ECG evidence of ischemia. Heart rate response to exercise was  normal, with hypertensive BP response.  No subjective symptoms to suggest ischemia.  No significant valve disease on screening doppler evaluation. The aortic root  and visualized ascending aorta are normal.    CT PE 2018: Lungs clear.  No evidence of pulmonary embolism.    EKG 2018 sinus rhythm otherwise unremarkable        Assessment and Plan:     #CAD S/P PCI with two drug eluting stents (LCx and RCA) on 6/1/2021  -Continues to report BEARDEN.  Unclear etiology.  Today I recommended her to see pulmonology however the patient wants to take care of his back and neck issues first.  -Recommend to stop Plavix end of this month then she can schedule her spinal injection.  -Recommend to stop metoprolol  -Continue atorvastatin and aspirin    #Hypertension  -Blood pressure is elevated in clinic today.  She is complaining a lot of pain which might be driving her high blood pressure.  Recommend to monitor BP at home particularly after she receives treatment for back and neck pain.  She will follow-up with her primary care physician.    Return to clinic as needed.    Total time spent today for this visit is 45 minutes including precharting, face-to-face clinic visit, review of labs/imaging and medical documentation.    Please donot hesitate to contact me if you have any questions or concerns. Again, thank you for allowing me to participate in the care of your patient.    Gil MARINO MD  [facility] Division of Cardiology  Pager [phone]

## 2021-12-07 NOTE — NURSING NOTE
"Chief Complaint   Patient presents with     Coronary Artery Disease     per patient chest discomfort, sob.,lightheaded, fatigue       Initial BP (!) 150/82 (BP Location: Left arm, Patient Position: Sitting, Cuff Size: Adult Large)   Pulse 84   Wt 69.9 kg (154 lb)   SpO2 95%   BMI 27.28 kg/m   Estimated body mass index is 27.28 kg/m  as calculated from the following:    Height as of 6/1/21: 1.6 m (5' 3\").    Weight as of this encounter: 69.9 kg (154 lb)..  BP completed using cuff size: regular    Arlette Martinez L.P.N.    "

## 2022-03-09 ENCOUNTER — ANCILLARY PROCEDURE (OUTPATIENT)
Dept: GENERAL RADIOLOGY | Facility: CLINIC | Age: 87
End: 2022-03-09
Attending: FAMILY MEDICINE
Payer: COMMERCIAL

## 2022-03-09 ENCOUNTER — OFFICE VISIT (OUTPATIENT)
Dept: FAMILY MEDICINE | Facility: CLINIC | Age: 87
End: 2022-03-09
Payer: COMMERCIAL

## 2022-03-09 VITALS
SYSTOLIC BLOOD PRESSURE: 150 MMHG | OXYGEN SATURATION: 98 % | BODY MASS INDEX: 28.13 KG/M2 | DIASTOLIC BLOOD PRESSURE: 70 MMHG | HEIGHT: 61 IN | TEMPERATURE: 97.7 F | HEART RATE: 87 BPM | WEIGHT: 149 LBS

## 2022-03-09 DIAGNOSIS — M54.2 NECK PAIN: Primary | ICD-10-CM

## 2022-03-09 DIAGNOSIS — M54.16 LUMBAR RADICULOPATHY: ICD-10-CM

## 2022-03-09 DIAGNOSIS — R06.00 DYSPNEA, UNSPECIFIED TYPE: ICD-10-CM

## 2022-03-09 DIAGNOSIS — I10 HYPERTENSION GOAL BP (BLOOD PRESSURE) < 140/90: ICD-10-CM

## 2022-03-09 DIAGNOSIS — M54.2 NECK PAIN: ICD-10-CM

## 2022-03-09 DIAGNOSIS — I25.118 CORONARY ARTERY DISEASE OF NATIVE ARTERY OF NATIVE HEART WITH STABLE ANGINA PECTORIS (H): ICD-10-CM

## 2022-03-09 PROBLEM — M50.30 DDD (DEGENERATIVE DISC DISEASE), CERVICAL: Status: ACTIVE | Noted: 2022-03-09

## 2022-03-09 PROCEDURE — 99214 OFFICE O/P EST MOD 30 MIN: CPT | Performed by: FAMILY MEDICINE

## 2022-03-09 PROCEDURE — 72040 X-RAY EXAM NECK SPINE 2-3 VW: CPT | Performed by: RADIOLOGY

## 2022-03-09 RX ORDER — METOPROLOL SUCCINATE 25 MG/1
12.5 TABLET, EXTENDED RELEASE ORAL DAILY
Qty: 45 TABLET | Refills: 3 | Status: SHIPPED | OUTPATIENT
Start: 2022-03-09 | End: 2023-05-01

## 2022-03-09 NOTE — PATIENT INSTRUCTIONS
Discuss getting a whooping cough (Tdap) vaccine from your pharmacist, or schedule an ancillary shot visit here.  Some insurances do not cover the cost of these vaccines.

## 2022-03-09 NOTE — PROGRESS NOTES
"  Assessment & Plan     (M54.2) Neck pain  (primary encounter diagnosis)  Comment: Differential diagnoses would include: degenerative disc disease   Plan: XR Cervical Spine 2/3 Views, Spine Referral        Patient declines trial of physical therapy. We discussed treatment options including tylenol and/or over-the-counter topicals as needed and/or referral. Follow-up as needed.    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 [facility]     (R06.00) Dyspnea, unspecified type  Comment: chronic, not helped by angioplasty; extensive prior evaluation reviewed, including prior normal spirometry; differential diagnoses would include: anxiety, deconditioning   Plan: Adult Pulmonary Medicine Referral            Review of external notes as documented elsewhere in note         BMI:   Estimated body mass index is 28.57 kg/m  as calculated from the following:    Height as of this encounter: 1.538 m (5' 0.55\").    Weight as of this encounter: 67.6 kg (149 lb).       See Patient Instructions    Return in about 1 month (around 4/9/2022) for Wellness visit (fasting labs up to one week prior), blood pressure.    Jocelin Guajardo MD  [facility] JUAN Ceja is a 87 year old who presents for the following health issues     HPI     Concern - Stiff neck  Onset: Since she had her stent surgery  Description: Neck and into right arm  Intensity: 9/10  Progression of Symptoms:  worsening  Accompanying Signs & Symptoms: Has gotten worse since her surgery  Previous history of " "similar problem: Yes, Many years ago  Precipitating factors:        Worsened by: Sometimes sleeping  Alleviating factors:        Improved by: Hemp Cream, Tylenol PM  Therapies tried and outcome: Hemp Cream, Tylenol PM, During the day Extra Strength Tylenol      Review of Systems   CONSTITUTIONAL: NEGATIVE for fever, chills, change in weight  ENT/MOUTH: NEGATIVE for ear, mouth and throat problems  RESP: SOB/dyspnea x years   CV: NEGATIVE for chest pain, palpitations or peripheral edema  MUSCULOSKELETAL:neck pain and radicular pain right lower extremity   NEURO: NEGATIVE for weakness, dizziness or paresthesias  PSYCHIATRIC: stress -  has dementia       Objective    BP (!) 150/70 (BP Location: Left arm, Patient Position: Sitting, Cuff Size: Adult Regular)   Pulse 87   Temp 97.7  F (36.5  C) (Oral)   Ht 1.538 m (5' 0.55\")   Wt 67.6 kg (149 lb)   SpO2 98%   BMI 28.57 kg/m    Body mass index is 28.57 kg/m .  Physical Exam   GENERAL: healthy, alert and no distress  EYES: Eyes grossly normal to inspection, PERRL and conjunctivae and sclerae normal  NECK: no adenopathy, no asymmetry, masses, or scars and thyroid normal to palpation  RESP: lungs clear to auscultation - no rales, rhonchi or wheezes  CV: regular rate and rhythm, normal S1 S2, no S3 or S4, no murmur, click or rub, no peripheral edema    MS: neck with reduced ROM   NEURO: Normal strength and tone, mentation intact and speech normal  PSYCH: mentation appears normal, affect flat, anxious, judgement and insight intact and appearance well groomed    Admission on 06/01/2021, Discharged on 06/01/2021   Component Date Value Ref Range Status     Sodium 06/01/2021 138  133 - 144 mmol/L Final     Potassium 06/01/2021 4.2  3.4 - 5.3 mmol/L Final     Chloride 06/01/2021 106  94 - 109 mmol/L Final     Carbon Dioxide 06/01/2021 29  20 - 32 mmol/L Final     Anion Gap 06/01/2021 3  3 - 14 mmol/L Final     Glucose 06/01/2021 88  70 - 99 mg/dL Final     Urea Nitrogen " 06/01/2021 21  7 - 30 mg/dL Final     Creatinine 06/01/2021 0.92  0.52 - 1.04 mg/dL Final     GFR Estimate 06/01/2021 57 (A) >60 mL/min/[1.73_m2] Final    Comment: Non  GFR Calc  Starting 12/18/2018, serum creatinine based estimated GFR (eGFR) will be   calculated using the Chronic Kidney Disease Epidemiology Collaboration   (CKD-EPI) equation.       GFR Estimate If Black 06/01/2021 66  >60 mL/min/[1.73_m2] Final    Comment:  GFR Calc  Starting 12/18/2018, serum creatinine based estimated GFR (eGFR) will be   calculated using the Chronic Kidney Disease Epidemiology Collaboration   (CKD-EPI) equation.       Calcium 06/01/2021 9.2  8.5 - 10.1 mg/dL Final     WBC 06/01/2021 7.8  4.0 - 11.0 10e9/L Final     RBC Count 06/01/2021 4.62  3.8 - 5.2 10e12/L Final     Hemoglobin 06/01/2021 14.0  11.7 - 15.7 g/dL Final     Hematocrit 06/01/2021 44.0  35.0 - 47.0 % Final     MCV 06/01/2021 95  78 - 100 fl Final     MCH 06/01/2021 30.3  26.5 - 33.0 pg Final     MCHC 06/01/2021 31.8  31.5 - 36.5 g/dL Final     RDW 06/01/2021 14.2  10.0 - 15.0 % Final     Platelet Count 06/01/2021 251  150 - 450 10e9/L Final     Interpretation ECG 06/01/2021 Click View Image link to view waveform and result   Final     Interpretation ECG 06/01/2021 Click View Image link to view waveform and result   Final     Activated Clot Time 06/01/2021 239 (A) 75 - 150 sec Final     Activated Clot Time 06/01/2021 183 (A) 75 - 150 sec Final

## 2022-03-09 NOTE — LETTER
March 10, 2022      Brenna Watkins  [address] [address]        Dear ,    We are writing to inform you of your test results.    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 Consider physical therapy. Use tylenol and topical medications over-the-counter as needed. See the Sports Medicine specialist when you're ready.     Let's discuss these results at your wellness visit.         Resulted Orders   XR Cervical Spine 2/3 Views    Narrative    CERVICAL SPINE TWO - THREE VIEWS  3/9/2022 1:43 PM     COMPARISON: None.    HISTORY: Neck pain.      Impression    IMPRESSION: Probable anterior fusion from C4 down to C6. Moderate  degenerative anterolisthesis of C7 upon T1. Mild degenerative  anterolisthesis of C2 upon C3. Alignment otherwise normal. Vertebral  body heights normal. No fractures. Loss of disc space height and  severe degenerative endplate spurring at C4 and C6-C7. Moderate-severe  facet arthropathy throughout the cervical spine.    LINDA POSEY MD         SYSTEM ID:  [id number]       If you have any questions or concerns, please call the clinic at the number listed above.       Sincerely,      Jocelin Guajardo MD/jeremy

## 2022-03-09 NOTE — RESULT ENCOUNTER NOTE
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 Consider physical therapy. Use tylenol and topical medications over-the-counter as needed. See the Sports Medicine specialist when you're ready.     Let's discuss these results at your wellness visit.     Jocelin Guajardo MD

## 2022-03-23 ENCOUNTER — TRANSFERRED RECORDS (OUTPATIENT)
Dept: HEALTH INFORMATION MANAGEMENT | Facility: CLINIC | Age: 87
End: 2022-03-23
Payer: COMMERCIAL

## 2022-03-28 DIAGNOSIS — R06.00 DYSPNEA: Primary | ICD-10-CM

## 2022-04-05 ENCOUNTER — TELEPHONE (OUTPATIENT)
Dept: CARDIOLOGY | Facility: CLINIC | Age: 87
End: 2022-04-05
Payer: COMMERCIAL

## 2022-04-05 NOTE — TELEPHONE ENCOUNTER
[facility] Call Center    Phone Message    May a detailed message be left on voicemail: yes     Reason for Call: Other: Per pt was talking to her surgeon and might have to have Epidural wants to know if  recommends this. Also wants to know if she can be taken off some of her medicaiton too please call her back to discuss.     Action Taken: Message routed to:  Other: Cardiology    Travel Screening: Not Applicable

## 2022-04-05 NOTE — TELEPHONE ENCOUNTER
Spoke to pt. Reviewed last cardiology note.     Pt was to stop plavix end of December.   Pt will stop it now.   Instructed pt to wait 7 days before having a repeat spinal injection (be off plavix 7 day). Pt verbalized understanding    PCP has kept pt on metoprolol. emr updated    Pt asked to schd followup with Dr Willson after pulmonology consult in June.   Follow up schd. Pt will cancel if pulmonology find lung issue.

## 2022-04-18 ENCOUNTER — TELEPHONE (OUTPATIENT)
Dept: FAMILY MEDICINE | Facility: CLINIC | Age: 87
End: 2022-04-18
Payer: COMMERCIAL

## 2022-04-18 NOTE — TELEPHONE ENCOUNTER
Patient Quality Outreach    Patient is due for the following:   Hypertension -  BP check  Physical  - Due  Immunizations  -  Tdap    NEXT STEPS:   Patient was scheduled for an appointment. 5/9/22 physical    Type of outreach:    Chart review performed, no outreach needed. Postponed until 5/9/22 to see if patient keeps appointment.      Questions for provider review:    None     Purnima Moreno, NINOSKA  Chart routed to Care Team.

## 2022-05-03 ENCOUNTER — LAB (OUTPATIENT)
Dept: LAB | Facility: CLINIC | Age: 87
End: 2022-05-03
Payer: COMMERCIAL

## 2022-05-03 ENCOUNTER — TELEPHONE (OUTPATIENT)
Dept: CARDIOLOGY | Facility: CLINIC | Age: 87
End: 2022-05-03

## 2022-05-03 DIAGNOSIS — I25.118 CORONARY ARTERY DISEASE OF NATIVE ARTERY OF NATIVE HEART WITH STABLE ANGINA PECTORIS (H): ICD-10-CM

## 2022-05-03 LAB
ANION GAP SERPL CALCULATED.3IONS-SCNC: 9 MMOL/L (ref 3–14)
BUN SERPL-MCNC: 15 MG/DL (ref 7–30)
CALCIUM SERPL-MCNC: 9.5 MG/DL (ref 8.5–10.1)
CHLORIDE BLD-SCNC: 108 MMOL/L (ref 94–109)
CHOLEST SERPL-MCNC: 225 MG/DL
CO2 SERPL-SCNC: 25 MMOL/L (ref 20–32)
CREAT SERPL-MCNC: 0.91 MG/DL (ref 0.52–1.04)
FASTING STATUS PATIENT QL REPORTED: YES
GFR SERPL CREATININE-BSD FRML MDRD: 62 ML/MIN/1.73M2
GLUCOSE BLD-MCNC: 94 MG/DL (ref 70–99)
HDLC SERPL-MCNC: 69 MG/DL
LDLC SERPL CALC-MCNC: 130 MG/DL
NONHDLC SERPL-MCNC: 156 MG/DL
POTASSIUM BLD-SCNC: 4.4 MMOL/L (ref 3.4–5.3)
SODIUM SERPL-SCNC: 142 MMOL/L (ref 133–144)
TRIGL SERPL-MCNC: 130 MG/DL

## 2022-05-03 PROCEDURE — 80061 LIPID PANEL: CPT

## 2022-05-03 PROCEDURE — 80048 BASIC METABOLIC PNL TOTAL CA: CPT

## 2022-05-03 PROCEDURE — 36415 COLL VENOUS BLD VENIPUNCTURE: CPT

## 2022-05-03 RX ORDER — ATORVASTATIN CALCIUM 20 MG/1
TABLET, FILM COATED ORAL
Qty: 90 TABLET | Refills: 3 | Status: SHIPPED | OUTPATIENT
Start: 2022-05-03 | End: 2022-05-04

## 2022-05-03 NOTE — TELEPHONE ENCOUNTER
[facility] Call Center    Phone Message    May a detailed message be left on voicemail: yes     Reason for Call: Other: Brenna is calling to ask Dr. Willson if it's ok to discontinue the asprin medication, and as to why the pharmacy had called about a medication that she cant refill please dvise      Action Taken: Message routed to:  [facility] cardio    Travel Screening: Not Applicable

## 2022-05-03 NOTE — TELEPHONE ENCOUNTER
Msg received regarding refill of atorvastatin that was already refilled and rec'd by pharmacy.   Pt also questioning whether she can stop aspirin     Noted Dr Willson clinic note from 12/7/2021- pt is to continue aspirin and atorvastatin.     Call to pt: left msg returning her call  Pt is not active on my chart

## 2022-05-04 ENCOUNTER — TELEPHONE (OUTPATIENT)
Dept: CARDIOLOGY | Facility: CLINIC | Age: 87
End: 2022-05-04
Payer: COMMERCIAL

## 2022-05-04 DIAGNOSIS — R06.09 DYSPNEA ON EXERTION: ICD-10-CM

## 2022-05-04 DIAGNOSIS — I25.118 CORONARY ARTERY DISEASE OF NATIVE ARTERY OF NATIVE HEART WITH STABLE ANGINA PECTORIS (H): ICD-10-CM

## 2022-05-04 RX ORDER — ASPIRIN 81 MG/1
81 TABLET, CHEWABLE ORAL DAILY
Qty: 90 TABLET | Refills: 3 | Status: SHIPPED | OUTPATIENT
Start: 2022-05-04 | End: 2023-05-24

## 2022-05-04 RX ORDER — ATORVASTATIN CALCIUM 20 MG/1
40 TABLET, FILM COATED ORAL DAILY
Qty: 90 TABLET | Refills: 3 | COMMUNITY
Start: 2022-05-04 | End: 2022-05-09

## 2022-05-04 NOTE — TELEPHONE ENCOUNTER
----- Message from Rajwinder Mejia PA-C sent at 5/4/2022 10:33 AM CDT -----  BMP normal. LDL not at goal in context of CAD. Patient on 20mg Lipitor only. Will increase this to 40mg daily and repeat lipid panel in 2 months.  Orders placed.  [facility] team, please call patient and inform her of change.  Thanks!

## 2022-05-04 NOTE — TELEPHONE ENCOUNTER
Pending Prescriptions:                       Disp   Refills    aspirin (ASA) 81 MG chewable tablet       90 tab*3            Sig: Take 1 tablet (81 mg) by mouth daily    Last Visit: 12/07/2021  Last Filled: 5/13/2021  Quantity: 90 + 3  Req. Received: 05/04/22    Chiquis Carnes, [facility]

## 2022-05-09 ENCOUNTER — OFFICE VISIT (OUTPATIENT)
Dept: FAMILY MEDICINE | Facility: CLINIC | Age: 87
End: 2022-05-09
Payer: COMMERCIAL

## 2022-05-09 VITALS
DIASTOLIC BLOOD PRESSURE: 78 MMHG | HEIGHT: 61 IN | HEART RATE: 83 BPM | OXYGEN SATURATION: 95 % | BODY MASS INDEX: 29.07 KG/M2 | SYSTOLIC BLOOD PRESSURE: 126 MMHG | TEMPERATURE: 98.7 F | WEIGHT: 154 LBS

## 2022-05-09 DIAGNOSIS — Z00.00 ENCOUNTER FOR MEDICARE ANNUAL WELLNESS EXAM: Primary | ICD-10-CM

## 2022-05-09 PROCEDURE — G0438 PPPS, INITIAL VISIT: HCPCS | Performed by: FAMILY MEDICINE

## 2022-05-09 RX ORDER — ATORVASTATIN CALCIUM 40 MG/1
40 TABLET, FILM COATED ORAL DAILY
Qty: 90 TABLET | Refills: 3 | Status: SHIPPED | OUTPATIENT
Start: 2022-05-09 | End: 2023-05-24

## 2022-05-09 ASSESSMENT — ACTIVITIES OF DAILY LIVING (ADL): CURRENT_FUNCTION: NO ASSISTANCE NEEDED

## 2022-05-09 NOTE — PATIENT INSTRUCTIONS
Discuss getting a whooping cough (Tdap) vaccine from your pharmacist, or schedule an ancillary shot visit here.  Some insurances do not cover the cost of these vaccines.         Patient Education   Personalized Prevention Plan  You are due for the preventive services outlined below.  Your care team is available to assist you in scheduling these services.  If you have already completed any of these items, please share that information with your care team to update in your medical record.  Health Maintenance Due   Topic Date Due    Diptheria Tetanus Pertussis (DTAP/TDAP/TD) Vaccine (1 - Tdap) Never done    Pneumococcal Vaccine (2 - PCV) 05/01/2014    FALL RISK ASSESSMENT  03/01/2022    COVID-19 Vaccine (4 - Booster for Pfizer series) 03/11/2022     Your Health Risk Assessment indicates you feel you are not in good health    A healthy lifestyle helps keep the body fit and the mind alert. It helps protect you from disease, helps you fight disease, and helps prevent chronic disease (disease that doesn't go away) from getting worse. This is important as you get older and begin to notice twinges in muscles and joints and a decline in the strength and stamina you once took for granted. A healthy lifestyle includes good healthcare, good nutrition, weight control, recreation, and regular exercise. Avoid harmful substances and do what you can to keep safe. Another part of a healthy lifestyle is stay mentally active and socially involved.    Good healthcare   Have a wellness visit every year.   If you have new symptoms, let us know right away. Don't wait until the next checkup.   Take medicines exactly as prescribed and keep your medicines in a safe place. Tell us if your medicine causes problems.   Healthy diet and weight control   Eat 3 or 4 small, nutritious, low-fat, high-fiber meals a day. Include a variety of fruits, vegetables, and whole-grain foods.   Make sure you get enough calcium in your diet. Calcium, vitamin D,  and exercise help prevent osteoporosis (bone thinning).   If you live alone, try eating with others when you can. That way you get a good meal and have company while you eat it.   Try to keep a healthy weight. If you eat more calories than your body uses for energy, it will be stored as fat and you will gain weight.     Recreation   Recreation is not limited to sports and team events. It includes any activity that provides relaxation, interest, enjoyment, and exercise. Recreation provides an outlet for physical, mental, and social energy. It can give a sense of worth and achievement. It can help you stay healthy.    Mental Exercise and Social Involvement  Mental and emotional health is as important as physical health. Keep in touch with friends and family. Stay as active as possible. Continue to learn and challenge yourself.   Things you can do to stay mentally active are:  Learn something new, like a foreign language or musical instrument.   Play SCRABBLE or do crossword puzzles. If you cannot find people to play these games with you at home, you can play them with others on your computer through the Internet.   Join a games club--anything from card games to chess or checkers or lawn bowling.   Start a new hobby.   Go back to school.   Volunteer.   Read.   Keep up with world events.    Urinary Incontinence, Female (Adult)   Urinary incontinence means loss of bladder control. This problem affects many women, especially as they get older. If you have incontinence, you may be embarrassed to ask for help. But know that this problem can be treated.   Types of Incontinence  There are different types of incontinence. Two of the main types are described here. You can have more than one type.   Stress incontinence. With this type, urine leaks when pressure (stress) is put on the bladder. This may happen when you cough, sneeze, or laugh. Stress incontinence most often occurs because the pelvic floor muscles that support the  bladder and urethra are weak. This can happen after pregnancy and vaginal childbirth or a hysterectomy. It can also be due to excess body weight or hormone changes.  Urge incontinence (also called overactive bladder). With this type, a sudden urge to urinate is felt often. This may happen even though there may not be much urine in the bladder. The need to urinate often during the night is common. Urge incontinence most often occurs because of bladder spasms. This may be due to bladder irritation or infection. Damage to bladder nerves or pelvic muscles, constipation, and certain medicines can also lead to urge incontinence.  Treatment depends on the cause. Further evaluation is needed to find the type you have. This will likely include an exam and certain tests. Based on the results, you and your healthcare provider can then plan treatment. Until a diagnosis is made, the home care tips below can help ease symptoms.   Home care  Do pelvic floor muscle exercises, if they are prescribed. The pelvic floor muscles help support the bladder and urethra. Many women find that their symptoms improve when doing special exercises that strengthen these muscles. To do the exercises, contract the muscles you would use to stop your stream of urine. But do this when you re not urinating. Hold for 10 seconds, then relax. Repeat 10 to 20 times in a row, at least 3 times a day. Your healthcare provider may give you other instructions for how to do the exercises and how often.  Keep a bladder diary. This helps track how often and how much you urinate over a set period of time. Bring this diary with you to your next visit with the provider. The information can help your provider learn more about your bladder problem.  Lose weight, if advised to by your provider. Extra weight puts pressure on the bladder. Your provider can help you create a weight-loss plan that s right for you. This may include exercising more and making certain diet  changes.  Don't have foods and drinks that may irritate the bladder. These can include alcohol and caffeinated drinks.  Quit smoking. Smoking and other tobacco use can lead to a long-term (chronic) cough that strains the pelvic floor muscles. Smoking may also damage the bladder and urethra. Talk with your provider about treatments or methods you can use to quit smoking.  If drinking large amounts of fluid makes you have symptoms, you may be advised to limit your fluid intake. You may also be advised to drink most of your fluids during the day and to limit fluids at night.  If you re worried about urine leakage or accidents, you may wear absorbent pads to catch urine. Change the pads often. This helps reduce discomfort. It may also reduce the risk of skin or bladder infections.    Follow-up care  Follow up with your healthcare provider, or as directed. It may take some to find the right treatment for your problem. But healthy lifestyle changes can be made right away. These include such things as exercising on a regular basis, eating a healthy diet, losing weight (if needed), and quitting smoking. Your treatment plan may include special therapies or medicines. Certain procedures or surgery may also be options. Talk about any questions you have with your provider.   When to seek medical advice  Call the healthcare provider right away if any of these occur:  Fever of 100.4 F (38 C) or higher, or as directed by your provider  Bladder pain or fullness  Belly swelling  Nausea or vomiting  Back pain  Weakness, dizziness, or fainting  Irene last reviewed this educational content on 1/1/2020 2000-2021 [employer]. All rights reserved. This information is not intended as a substitute for professional medical care. Always follow your healthcare professional's instructions.

## 2022-05-09 NOTE — TELEPHONE ENCOUNTER
Patient Quality Outreach    Patient is due for the following:   Hypertension -  BP check  Physical  - Due    NEXT STEPS:   Patient has upcoming appointment, these items will be addressed at that time.    Type of outreach:    Patient seen in clinic on 5/9/22 for physical.    Next Steps:  Reach out within 90 days via Letter.    Max number of attempts reached: Yes. Will try again in 90 days if patient still on fail list.    Questions for provider review:    None     Purnima Moreno CMA  Chart routed to Care Team.

## 2022-05-09 NOTE — PROGRESS NOTES
"SUBJECTIVE:   Brenna Watkins is a 87 year old female who presents for Preventive Visit.    Patient has been advised of split billing requirements and indicates understanding: Yes  Are you in the first 12 months of your Medicare coverage?  No    Healthy Habits:     In general, how would you rate your overall health?  Fair    Frequency of exercise:  2-3 days/week    Duration of exercise:  15-30 minutes    Do you usually eat at least 4 servings of fruit and vegetables a day, include whole grains    & fiber and avoid regularly eating high fat or \"junk\" foods?  Yes    Taking medications regularly:  Yes    Barriers to taking medications:  None    Medication side effects:  None    Ability to successfully perform activities of daily living:  No assistance needed    Home Safety:  No safety concerns identified    Hearing Impairment:  No hearing concerns    In the past 6 months, have you been bothered by leaking of urine? Yes    In general, how would you rate your overall mental or emotional health?  Good      PHQ-2 Total Score: 2    Additional concerns today:  Yes    Do you feel safe in your environment? Yes    Have you ever done Advance Care Planning? (For example, a Health Directive, POLST, or a discussion with a medical provider or your loved ones about your wishes): Yes, patient states has an Advance Care Planning document and will bring a copy to the clinic.       Fall risk  Fallen 2 or more times in the past year?: No  Any fall with injury in the past year?: No    Cognitive Screening   1) Repeat 3 items (Leader, Season, Table)    2) Clock draw: NORMAL  3) 3 item recall: Recalls 2 objects   Results: NORMAL clock, 1-2 items recalled: COGNITIVE IMPAIRMENT LESS LIKELY    Mini-CogTM Copyright PETER Palumbo. Licensed by the author for use in [facility]; reprinted with permission (zechariah@.[email or web address]). All rights reserved.      Do you have sleep apnea, excessive snoring or daytime drowsiness?: no    Reviewed and updated as " needed this visit by clinical staff   Tobacco  Allergies  Meds  Problems  Med Hx  Surg Hx  Fam Hx  Soc   Hx          Reviewed and updated as needed this visit by Provider   Tobacco  Allergies  Meds  Problems  Med Hx  Surg Hx  Fam Hx           Social History     Tobacco Use     Smoking status: Never Smoker     Smokeless tobacco: Never Used   Substance Use Topics     Alcohol use: Yes     Alcohol/week: 1.0 - 2.0 standard drink     Types: 1 - 2 Glasses of wine per week     If you drink alcohol do you typically have >3 drinks per day or >7 drinks per week? No    Alcohol Use 5/9/2022   Prescreen: >3 drinks/day or >7 drinks/week? No           Pain all over. Back is the biggest problem.  Chest x-ray    Current providers sharing in care for this patient include:   Patient Care Team:  Jocelin Guajardo MD as PCP - General (Family Practice)  Jocelin Guajardo MD as Assigned PCP  Jordi Singer MD as MD (Orthopedics)  Ozzy Freeman MD as Assigned Surgical Provider  Gil Willson MD as Assigned Heart and Vascular Provider    The following health maintenance items are reviewed in Epic and correct as of today:  Health Maintenance Due   Topic Date Due     DTAP/TDAP/TD IMMUNIZATION (1 - Tdap) Never done     Pneumococcal Vaccine: 65+ Years (2 - PCV) 05/01/2014     FALL RISK ASSESSMENT  03/01/2022     COVID-19 Vaccine (4 - Booster for Pfizer series) 03/11/2022     Patient Active Problem List   Diagnosis     Advanced directives, counseling/discussion     CTS (carpal tunnel syndrome)     Hypertension goal BP (blood pressure) < 140/90     Osteopenia     GERD without esophagitis     Lumbar radiculopathy     Cerebral aneurysm, nonruptured     CAD (coronary artery disease)     Hyperlipidemia LDL goal <70     DDD (degenerative disc disease), cervical     Past Surgical History:   Procedure Laterality Date     APPENDECTOMY       AS REVISE ULNAR NERVE AT ELBOW  2018     BACK SURGERY  2015     CARPAL  TUNNEL RELEASE RT/LT Right      CRANIOTOMY, REPAIR ANEURYSM, COMBINED       CV CORONARY ANGIOGRAM N/A 6/1/2021    Procedure: CV CORONARY ANGIOGRAM;  Surgeon: Syed Zayas MD;  Location: [facility] CARDIAC CATH LAB     CV PCI STENT DRUG ELUTING N/A 6/1/2021    Procedure: Percutaneous Coronary Intervention Stent Drug Eluting;  Surgeon: Syed Zayas MD;  Location: [facility] CARDIAC CATH LAB     IR CAROTID ANGIOGRAM  1/26/2012     IR CAROTID ANGIOGRAM  1/26/2012     IR MISCELLANEOUS PROCEDURE  1/26/2012     STRIP VEIN       TOE SURGERY  03/2018       Social History     Tobacco Use     Smoking status: Never Smoker     Smokeless tobacco: Never Used   Substance Use Topics     Alcohol use: Yes     Alcohol/week: 1.0 - 2.0 standard drink     Types: 1 - 2 Glasses of wine per week     Family History   Problem Relation Age of Onset     Sudden Death Mother         d. childbirth      Alcoholism Father 62     Myocardial Infarction Brother 68     Alzheimer Disease Sister      Diabetes No family hx of      Cancer No family hx of          Current Outpatient Medications   Medication Sig Dispense Refill     aspirin (ASA) 81 MG chewable tablet Take 1 tablet (81 mg) by mouth daily 90 tablet 3     atorvastatin (LIPITOR) 40 MG tablet Take 1 tablet (40 mg) by mouth daily 90 tablet 3     diphenhydrAMINE-acetaminophen (TYLENOL PM)  MG tablet Take 2 tablets by mouth nightly as needed for sleep       metoprolol succinate ER (TOPROL-XL) 25 MG 24 hr tablet Take 0.5 tablets (12.5 mg) by mouth daily 45 tablet 3     multivitamin w/minerals (THERA-VIT-M) tablet Take 1 tablet by mouth daily       Omeprazole (PRILOSEC PO) Take by mouth daily       Allergies   Allergen Reactions     Pcn [Penicillins]            Pertinent mammograms are reviewed under the imaging tab.    Review of Systems  CONSTITUTIONAL: NEGATIVE for fever, chills, change in weight  INTEGUMENTARY/SKIN: NEGATIVE for worrisome rashes, moles or  "lesions  EYES: NEGATIVE for vision changes or irritation  ENT/MOUTH: NEGATIVE for ear, mouth and throat problems  RESP: chronic dyspnea for 5+ years   BREAST: NEGATIVE for masses, tenderness or discharge  CV: NEGATIVE for chest pain, palpitations or peripheral edema  GI: NEGATIVE for nausea, abdominal pain, heartburn, or change in bowel habits  : NEGATIVE for frequency, dysuria, or hematuria  MUSCULOSKELETAL: NEGATIVE for significant arthralgias or myalgia  NEURO: NEGATIVE for weakness, dizziness or paresthesias  ENDOCRINE: NEGATIVE for temperature intolerance, skin/hair changes  HEME: NEGATIVE for bleeding problems  PSYCHIATRIC: NEGATIVE for changes in mood or affect    OBJECTIVE:   /78 (BP Location: Left arm, Patient Position: Sitting, Cuff Size: Adult Regular)   Pulse 83   Temp 98.7  F (37.1  C) (Oral)   Ht 1.538 m (5' 0.55\")   Wt 69.9 kg (154 lb)   SpO2 95%   BMI 29.53 kg/m   Estimated body mass index is 29.53 kg/m  as calculated from the following:    Height as of this encounter: 1.538 m (5' 0.55\").    Weight as of this encounter: 69.9 kg (154 lb).  Physical Exam  GENERAL: healthy, alert and no distress  EYES: Eyes grossly normal to inspection, PERRL and conjunctivae and sclerae normal  HENT: ear canals and TM's normal, nose and mouth without ulcers or lesions  NECK: no adenopathy, no asymmetry, masses, or scars and thyroid normal to palpation  RESP: lungs clear to auscultation - no rales, rhonchi or wheezes  CV: regular rate and rhythm, normal S1 S2, no S3 or S4, no murmur, click or rub, no peripheral edema   MS: no gross musculoskeletal defects noted, no edema  PSYCH: mentation appears normal, affect normal/bright    Diagnostic Test Results:  Labs reviewed in Epic    ASSESSMENT / PLAN:   (Z00.00) Encounter for Medicare annual wellness exam  (primary encounter diagnosis)    COUNSELING:  Reviewed preventive health counseling, as reflected in patient instructions  Special attention given to:       " "Regular exercise       Healthy diet/nutrition       Aspirin prophylaxis        Osteoporosis prevention/bone health    Estimated body mass index is 29.53 kg/m  as calculated from the following:    Height as of this encounter: 1.538 m (5' 0.55\").    Weight as of this encounter: 69.9 kg (154 lb).        She reports that she has never smoked. She has never used smokeless tobacco.      Appropriate preventive services were discussed with this patient, including applicable screening as appropriate for cardiovascular disease, diabetes, osteopenia/osteoporosis, and glaucoma.  As appropriate for age/gender, discussed screening for colorectal cancer, prostate cancer, breast cancer, and cervical cancer. Checklist reviewing preventive services available has been given to the patient.    Reviewed patients plan of care and provided an AVS. The Basic Care Plan (routine screening as documented in Health Maintenance) for Brenna meets the Care Plan requirement. This Care Plan has been established and reviewed with the Patient.    Counseling Resources:  ATP IV Guidelines  Pooled Cohorts Equation Calculator  Breast Cancer Risk Calculator  Breast Cancer: Medication to Reduce Risk  FRAX Risk Assessment  ICSI Preventive Guidelines  Dietary Guidelines for Americans, 2010  [employer]'s MyPlate  ASA Prophylaxis  Lung CA Screening    Jocelin Guajardo MD  [facility]    Identified Health Risks:    The patient was provided with suggestions to help her develop a healthy physical lifestyle.  Information on urinary incontinence and treatment options given to patient.  "

## 2022-06-01 ENCOUNTER — OFFICE VISIT (OUTPATIENT)
Dept: PULMONOLOGY | Facility: OTHER | Age: 87
End: 2022-06-01
Attending: FAMILY MEDICINE
Payer: COMMERCIAL

## 2022-06-01 ENCOUNTER — ALLIED HEALTH/NURSE VISIT (OUTPATIENT)
Dept: PULMONOLOGY | Facility: OTHER | Age: 87
End: 2022-06-01
Payer: COMMERCIAL

## 2022-06-01 VITALS
OXYGEN SATURATION: 95 % | HEIGHT: 61 IN | DIASTOLIC BLOOD PRESSURE: 70 MMHG | BODY MASS INDEX: 28.85 KG/M2 | SYSTOLIC BLOOD PRESSURE: 122 MMHG | HEART RATE: 80 BPM | WEIGHT: 152.8 LBS

## 2022-06-01 DIAGNOSIS — R06.00 DYSPNEA, UNSPECIFIED TYPE: ICD-10-CM

## 2022-06-01 DIAGNOSIS — R06.00 DYSPNEA: ICD-10-CM

## 2022-06-01 DIAGNOSIS — R06.09 DYSPNEA ON EXERTION: Primary | ICD-10-CM

## 2022-06-01 LAB — HGB BLD-MCNC: 13.2 G/DL

## 2022-06-01 PROCEDURE — 94726 PLETHYSMOGRAPHY LUNG VOLUMES: CPT | Performed by: INTERNAL MEDICINE

## 2022-06-01 PROCEDURE — 99205 OFFICE O/P NEW HI 60 MIN: CPT | Mod: 25 | Performed by: INTERNAL MEDICINE

## 2022-06-01 PROCEDURE — 85018 HEMOGLOBIN: CPT | Performed by: INTERNAL MEDICINE

## 2022-06-01 PROCEDURE — 94375 RESPIRATORY FLOW VOLUME LOOP: CPT | Performed by: INTERNAL MEDICINE

## 2022-06-01 PROCEDURE — 94729 DIFFUSING CAPACITY: CPT | Performed by: INTERNAL MEDICINE

## 2022-06-01 NOTE — PATIENT INSTRUCTIONS
It was good to meet you in clinic today. This is what we discussed:    Your lung function is normal.  We will get a chest X-ray.  We will get a repeat ultrasound of the heart (echocardiogram).  I will contact you with results.  Contact me with questions or concerns.    Jose Angel Blanco MD  Pulmonary and Critical Care Medicine  [facility]  Office [phone]

## 2022-06-01 NOTE — LETTER
6/1/2022         RE: Brenna Watkins  [address] [facility] [place] [facility] [address]        Dear Colleague,    Thank you for referring your patient, Brenna Watkins, to the [facility]. Please see a copy of my visit note below.    Pulmonary Clinic Outpatient Consultation    Assessment and Plan:   87 year old female never smoker with a history of cerebral aneurysm s/p craniotomy and repair, CAD s/p TIFFANY to RCA and LCx in June 2021, degenerative surgical spine disease s/p surgery, GERD, dyslipidemia, HTN, osteopenia, presenting for evaluation of chronic exertional dyspnea.    Chronic exertional dyspnea: Unclear etiology, and history is difficult to assess, as she is limited by neck and hip pain which is associated with the feeling of dyspnea. Specifically, movement including walking exacerbates her cervical spine pain which leads to anxiety and dyspnea. Clearly has cervical spine pain from DJD and past surgery which is a prominent symptom. She is a never smoker with most recent chest imaging consisting of lung windows from CT coronary angiogram in May 2021, which showed normal lung parenchyma (of course this excludes the lung apices, though complete chest CT in March 2018 was also unrevealing), and pulmonary function is completely normal on testing today. I did offer her a repeat chest X-ray and TTE, though I am doubtful of finding a clear etiology by these tests. I think that managing her neck pain should be a main focus.    Plan:  - reassured that chest imaging and pulmonary function have shown no pulmonary abnormalities  - physical therapy for neck pain will be an important focus  - will obtain CXR and repeat TTE and I will contact her with results  - follow up in pulmonary clinic as needed  - encouraged her to contact us with questions or concerning symptoms    Jose Angel Blanco MD  [facility]  Office [phone]  Pager [phone]  (he/him/his)    CCx: exertional dyspnea    HPI:  87 year old female never smoker with a history of cerebral aneurysm s/p craniotomy and repair, CAD s/p TIFFANY to RCA and LCx in June 2021, degenerative surgical spine disease s/p surgery, GERD, dyslipidemia, HTN, osteopenia, presenting for evaluation of chronic exertional dyspnea. Notes that for 1.5 years, with walking she experiences pain in the upper chest, neck, and face, which leads to a sensation of having to catch her breath. Significant DJD of spine with prior surgery. Did have TIFFANY to RCA and LCx in June 2021 which did not affect her symptoms. Also limited by hip pain. No wheezing. Minimal cough in the morning. CT of chest 2018 and CT coronary angiogram in May 2021 showed normal lung parenchyma. TTE in May 2021 unrevealing. PFT today is normal. Normal vital signs. No FHx of lung disease.    ROS:  A 12-system review was obtained and was negative with the exception of the symptoms endorsed in the history of present illness.    PMH:  cerebral aneurysm s/p craniotomy and repair  CAD s/p TIFFANY to RCA and LCx in June 2021  degenerative surgical spine disease s/p surgery  GERD  Dyslipidemia  HTN  osteopenia    PSH:  Past Surgical History:   Procedure Laterality Date     APPENDECTOMY       AS REVISE ULNAR NERVE AT ELBOW  2018     BACK SURGERY  2015     CARPAL TUNNEL RELEASE RT/LT Right      CRANIOTOMY, REPAIR ANEURYSM, COMBINED       CV CORONARY ANGIOGRAM N/A 6/1/2021    Procedure: CV CORONARY ANGIOGRAM;  Surgeon: Syed Zayas MD;  Location:  HEART CARDIAC CATH LAB     CV PCI STENT DRUG ELUTING N/A 6/1/2021    Procedure: Percutaneous Coronary Intervention Stent Drug Eluting;  Surgeon: Syed Zayas MD;  Location:  HEART CARDIAC CATH LAB     IR CAROTID ANGIOGRAM  1/26/2012     IR CAROTID ANGIOGRAM  1/26/2012     IR MISCELLANEOUS PROCEDURE  1/26/2012     STRIP VEIN       TOE SURGERY  03/2018       Allergies:  Allergies   Allergen Reactions     Pcn [Penicillins]        Family  HX:  Family History   Problem Relation Age of Onset     Sudden Death Mother         d. childbirth      Alcoholism Father 62     Myocardial Infarction Brother 68     Alzheimer Disease Sister      Diabetes No family hx of      Cancer No family hx of        Social Hx:  Social History     Socioeconomic History     Marital status:      Spouse name: Rishabh      Number of children: 4     Years of education: 12     Highest education level: Not on file   Occupational History     Occupation: [employer], [employer]      Employer: RETIRED   Tobacco Use     Smoking status: Never Smoker     Smokeless tobacco: Never Used   Substance and Sexual Activity     Alcohol use: Yes     Alcohol/week: 1.0 - 2.0 standard drink     Types: 1 - 2 Glasses of wine per week     Drug use: No     Sexual activity: Not Currently     Partners: Male     Birth control/protection: Post-menopausal   Other Topics Concern     Parent/sibling w/ CABG, MI or angioplasty before 65F 55M? No   Social History Narrative     Not on file     Social Determinants of Health     Financial Resource Strain: Not on file   Food Insecurity: Not on file   Transportation Needs: Not on file   Physical Activity: Not on file   Stress: Not on file   Social Connections: Not on file   Intimate Partner Violence: Not on file   Housing Stability: Not on file       Current Meds:  Current Outpatient Medications   Medication Sig Dispense Refill     aspirin (ASA) 81 MG chewable tablet Take 1 tablet (81 mg) by mouth daily 90 tablet 3     atorvastatin (LIPITOR) 40 MG tablet Take 1 tablet (40 mg) by mouth daily 90 tablet 3     diphenhydrAMINE-acetaminophen (TYLENOL PM)  MG tablet Take 2 tablets by mouth nightly as needed for sleep       multivitamin w/minerals (THERA-VIT-M) tablet Take 1 tablet by mouth daily       Omeprazole (PRILOSEC PO) Take 20 mg by mouth daily       metoprolol succinate ER (TOPROL-XL) 25 MG 24 hr tablet Take 0.5 tablets (12.5 mg) by mouth daily (Patient not  "taking: Reported on 6/1/2022) 45 tablet 3       Physical Exam:  /70 (BP Location: Left arm, Patient Position: Chair, Cuff Size: Adult Regular)   Pulse 80   Ht 1.549 m (5' 1\")   Wt 69.3 kg (152 lb 12.8 oz)   SpO2 95%   BMI 28.87 kg/m    Gen: alert, oriented, no distress  HEENT: no cervical or supraclavicular lymphadenopathy, no neck mass  CV: RRR, no M/G/R  Resp: CTAB, no focal crackles or wheezes  Skin: no apparent rashes  Ext: no cyanosis, clubbing or edema  Neuro: alert, nonfocal    Labs:  reviewed    Imaging studies:  Chest CTA (March 2018):  - images directly reviewed, formal interpretation follows:  FINDINGS:  No evidence of pulmonary embolus. No evidence of aortic  aneurysm or dissection. Lungs are clear. No adenopathy. No pleural  fluid. Included upper abdomen is unremarkable.                                                                      IMPRESSION: No evidence of pulmonary embolus. Lungs are clear.     CT coronary angiogram (May 2021):  - reviewed pulmonary windows: no evidence of pulmonary parenchymal or pleural disease in lung fields scanned    TTE (May 2021):  Global and regional left ventricular function is normal with an EF of 60-65%.  Right ventricular function, chamber size, wall motion, and thickness are  normal.  Both atria appear normal.  Pulmonary artery systolic pressure is normal.  The inferior vena cava is normal.  No pericardial effusion is present.    Pulmonary Function Testing  June 2022:  FVC 2.29 (108%)  FEV1 1.94 (120%)  FEV1/FVC 0.85  RV 2.11 (98%)  TLC 4.45 (102%)  DLCOc 97%      Again, thank you for allowing me to participate in the care of your patient.        Sincerely,        Jose Angel Blanco MD  "

## 2022-06-02 LAB
DLCOCOR-%PRED-PRE: 97 %
DLCOCOR-PRE: 15.92 ML/MIN/MMHG
DLCOUNC-%PRED-PRE: 96 %
DLCOUNC-PRE: 15.82 ML/MIN/MMHG
DLCOUNC-PRED: 16.37 ML/MIN/MMHG
ERV-%PRED-PRE: 109 %
ERV-PRE: 0.27 L
ERV-PRED: 0.25 L
EXPTIME-PRE: 6.81 SEC
FEF2575-%PRED-PRE: 193 %
FEF2575-PRE: 2.54 L/SEC
FEF2575-PRED: 1.32 L/SEC
FEFMAX-%PRED-PRE: 136 %
FEFMAX-PRE: 5.16 L/SEC
FEFMAX-PRED: 3.77 L/SEC
FEV1-%PRED-PRE: 120 %
FEV1-PRE: 1.94 L
FEV1FEV6-PRE: 85 %
FEV1FEV6-PRED: 77 %
FEV1FVC-PRE: 85 %
FEV1FVC-PRED: 77 %
FEV1SVC-PRE: 83 %
FEV1SVC-PRED: 70 %
FIFMAX-PRE: 3.29 L/SEC
FRCPLETH-%PRED-PRE: 94 %
FRCPLETH-PRE: 2.39 L
FRCPLETH-PRED: 2.53 L
FVC-%PRED-PRE: 108 %
FVC-PRE: 2.29 L
FVC-PRED: 2.12 L
IC-%PRED-PRE: 100 %
IC-PRE: 2.06 L
IC-PRED: 2.05 L
RVPLETH-%PRED-PRE: 98 %
RVPLETH-PRE: 2.11 L
RVPLETH-PRED: 2.14 L
TLCPLETH-%PRED-PRE: 102 %
TLCPLETH-PRE: 4.45 L
TLCPLETH-PRED: 4.35 L
VA-%PRED-PRE: 102 %
VA-PRE: 4.05 L
VC-%PRED-PRE: 101 %
VC-PRE: 2.33 L
VC-PRED: 2.3 L

## 2022-06-02 NOTE — PROGRESS NOTES
Pulmonary Clinic Outpatient Consultation    Assessment and Plan:   87 year old female never smoker with a history of cerebral aneurysm s/p craniotomy and repair, CAD s/p TIFFANY to RCA and LCx in June 2021, degenerative surgical spine disease s/p surgery, GERD, dyslipidemia, HTN, osteopenia, presenting for evaluation of chronic exertional dyspnea.    Chronic exertional dyspnea: Unclear etiology, and history is difficult to assess, as she is limited by neck and hip pain which is associated with the feeling of dyspnea. Specifically, movement including walking exacerbates her cervical spine pain which leads to anxiety and dyspnea. Clearly has cervical spine pain from DJD and past surgery which is a prominent symptom. She is a never smoker with most recent chest imaging consisting of lung windows from CT coronary angiogram in May 2021, which showed normal lung parenchyma (of course this excludes the lung apices, though complete chest CT in March 2018 was also unrevealing), and pulmonary function is completely normal on testing today. I did offer her a repeat chest X-ray and TTE, though I am doubtful of finding a clear etiology by these tests. I think that managing her neck pain should be a main focus.    Plan:  - reassured that chest imaging and pulmonary function have shown no pulmonary abnormalities  - physical therapy for neck pain will be an important focus  - will obtain CXR and repeat TTE and I will contact her with results  - follow up in pulmonary clinic as needed  - encouraged her to contact us with questions or concerning symptoms    Jose Angel Blanco MD  [facility]  Office [phone]  Pager [phone]  (he/him/his)    CCx: exertional dyspnea    HPI: 87 year old female never smoker with a history of cerebral aneurysm s/p craniotomy and repair, CAD s/p TIFFANY to RCA and LCx in June 2021, degenerative surgical spine disease s/p surgery, GERD, dyslipidemia, HTN, osteopenia, presenting for evaluation  of chronic exertional dyspnea. Notes that for 1.5 years, with walking she experiences pain in the upper chest, neck, and face, which leads to a sensation of having to catch her breath. Significant DJD of spine with prior surgery. Did have TIFFANY to RCA and LCx in June 2021 which did not affect her symptoms. Also limited by hip pain. No wheezing. Minimal cough in the morning. CT of chest 2018 and CT coronary angiogram in May 2021 showed normal lung parenchyma. TTE in May 2021 unrevealing. PFT today is normal. Normal vital signs. No FHx of lung disease.    ROS:  A 12-system review was obtained and was negative with the exception of the symptoms endorsed in the history of present illness.    PMH:  cerebral aneurysm s/p craniotomy and repair  CAD s/p TIFFANY to RCA and LCx in June 2021  degenerative surgical spine disease s/p surgery  GERD  Dyslipidemia  HTN  osteopenia    PSH:  Past Surgical History:   Procedure Laterality Date     APPENDECTOMY       AS REVISE ULNAR NERVE AT ELBOW  2018     BACK SURGERY  2015     CARPAL TUNNEL RELEASE RT/LT Right      CRANIOTOMY, REPAIR ANEURYSM, COMBINED       CV CORONARY ANGIOGRAM N/A 6/1/2021    Procedure: CV CORONARY ANGIOGRAM;  Surgeon: Syed Zayas MD;  Location:  HEART CARDIAC CATH LAB     CV PCI STENT DRUG ELUTING N/A 6/1/2021    Procedure: Percutaneous Coronary Intervention Stent Drug Eluting;  Surgeon: Syed Zayas MD;  Location: [facility] CARDIAC CATH LAB     IR CAROTID ANGIOGRAM  1/26/2012     IR CAROTID ANGIOGRAM  1/26/2012     IR MISCELLANEOUS PROCEDURE  1/26/2012     STRIP VEIN       TOE SURGERY  03/2018       Allergies:  Allergies   Allergen Reactions     Pcn [Penicillins]        Family HX:  Family History   Problem Relation Age of Onset     Sudden Death Mother         d. childbirth      Alcoholism Father 62     Myocardial Infarction Brother 68     Alzheimer Disease Sister      Diabetes No family hx of      Cancer No family hx of   "      Social Hx:  Social History     Socioeconomic History     Marital status:      Spouse name: Rishabh      Number of children: 4     Years of education: 12     Highest education level: Not on file   Occupational History     Occupation: , [employer]      Employer: RETIRED   Tobacco Use     Smoking status: Never Smoker     Smokeless tobacco: Never Used   Substance and Sexual Activity     Alcohol use: Yes     Alcohol/week: 1.0 - 2.0 standard drink     Types: 1 - 2 Glasses of wine per week     Drug use: No     Sexual activity: Not Currently     Partners: Male     Birth control/protection: Post-menopausal   Other Topics Concern     Parent/sibling w/ CABG, MI or angioplasty before 65F 55M? No   Social History Narrative     Not on file     Social Determinants of Health     Financial Resource Strain: Not on file   Food Insecurity: Not on file   Transportation Needs: Not on file   Physical Activity: Not on file   Stress: Not on file   Social Connections: Not on file   Intimate Partner Violence: Not on file   Housing Stability: Not on file       Current Meds:  Current Outpatient Medications   Medication Sig Dispense Refill     aspirin (ASA) 81 MG chewable tablet Take 1 tablet (81 mg) by mouth daily 90 tablet 3     atorvastatin (LIPITOR) 40 MG tablet Take 1 tablet (40 mg) by mouth daily 90 tablet 3     diphenhydrAMINE-acetaminophen (TYLENOL PM)  MG tablet Take 2 tablets by mouth nightly as needed for sleep       multivitamin w/minerals (THERA-VIT-M) tablet Take 1 tablet by mouth daily       Omeprazole (PRILOSEC PO) Take 20 mg by mouth daily       metoprolol succinate ER (TOPROL-XL) 25 MG 24 hr tablet Take 0.5 tablets (12.5 mg) by mouth daily (Patient not taking: Reported on 6/1/2022) 45 tablet 3       Physical Exam:  /70 (BP Location: Left arm, Patient Position: Chair, Cuff Size: Adult Regular)   Pulse 80   Ht 1.549 m (5' 1\")   Wt 69.3 kg (152 lb 12.8 oz)   SpO2 95%   BMI 28.87 kg/m    Gen: " alert, oriented, no distress  HEENT: no cervical or supraclavicular lymphadenopathy, no neck mass  CV: RRR, no M/G/R  Resp: CTAB, no focal crackles or wheezes  Skin: no apparent rashes  Ext: no cyanosis, clubbing or edema  Neuro: alert, nonfocal    Labs:  reviewed    Imaging studies:  Chest CTA (March 2018):  - images directly reviewed, formal interpretation follows:  FINDINGS:  No evidence of pulmonary embolus. No evidence of aortic  aneurysm or dissection. Lungs are clear. No adenopathy. No pleural  fluid. Included upper abdomen is unremarkable.                                                                      IMPRESSION: No evidence of pulmonary embolus. Lungs are clear.     CT coronary angiogram (May 2021):  - reviewed pulmonary windows: no evidence of pulmonary parenchymal or pleural disease in lung fields scanned    TTE (May 2021):  Global and regional left ventricular function is normal with an EF of 60-65%.  Right ventricular function, chamber size, wall motion, and thickness are  normal.  Both atria appear normal.  Pulmonary artery systolic pressure is normal.  The inferior vena cava is normal.  No pericardial effusion is present.    Pulmonary Function Testing  June 2022:  FVC 2.29 (108%)  FEV1 1.94 (120%)  FEV1/FVC 0.85  RV 2.11 (98%)  TLC 4.45 (102%)  DLCOc 97%

## 2022-06-30 ENCOUNTER — HOSPITAL ENCOUNTER (OUTPATIENT)
Dept: CARDIOLOGY | Facility: HOSPITAL | Age: 87
Discharge: HOME | End: 2022-06-30
Attending: INTERNAL MEDICINE | Admitting: INTERNAL MEDICINE
Payer: COMMERCIAL

## 2022-06-30 DIAGNOSIS — R06.09 DYSPNEA ON EXERTION: ICD-10-CM

## 2022-06-30 LAB — LVEF ECHO: NORMAL

## 2022-06-30 PROCEDURE — 93306 TTE W/DOPPLER COMPLETE: CPT | Mod: 26 | Performed by: INTERNAL MEDICINE

## 2022-06-30 PROCEDURE — 93306 TTE W/DOPPLER COMPLETE: CPT

## 2022-07-05 ENCOUNTER — TELEPHONE (OUTPATIENT)
Dept: PULMONOLOGY | Facility: OTHER | Age: 87
End: 2022-07-05

## 2022-07-05 NOTE — TELEPHONE ENCOUNTER
Called and spoke with Brenna.      Per Dr. Blanco:  Can you call Brenna and let her know that her TTE is essentially normal? She can follow up with me as needed. She has normal lung function. If she has questions or concerns, let me know.       Brenna states that she does still have concerns and would like to speak with Dr. Blanco about her symptoms.

## 2022-07-07 ENCOUNTER — TELEPHONE (OUTPATIENT)
Dept: PULMONOLOGY | Facility: OTHER | Age: 87
End: 2022-07-07

## 2022-07-07 ENCOUNTER — LAB (OUTPATIENT)
Dept: LAB | Facility: CLINIC | Age: 87
End: 2022-07-07
Payer: COMMERCIAL

## 2022-07-07 DIAGNOSIS — I25.118 CORONARY ARTERY DISEASE OF NATIVE ARTERY OF NATIVE HEART WITH STABLE ANGINA PECTORIS (H): ICD-10-CM

## 2022-07-07 LAB
CHOLEST SERPL-MCNC: 188 MG/DL
FASTING STATUS PATIENT QL REPORTED: YES
HDLC SERPL-MCNC: 66 MG/DL
LDLC SERPL CALC-MCNC: 93 MG/DL
NONHDLC SERPL-MCNC: 122 MG/DL
TRIGL SERPL-MCNC: 147 MG/DL

## 2022-07-07 PROCEDURE — 80061 LIPID PANEL: CPT

## 2022-07-07 PROCEDURE — 36415 COLL VENOUS BLD VENIPUNCTURE: CPT

## 2022-07-07 NOTE — LETTER
2022    Dear Dr. Guajardo,    See below for documentation regarding Brenna Watkins ( 1935). Please feel free to call me at any time if needed.    Sincerely,    Jose Angel Blanco MD  Pulmonary and Critical Care Medicine  [facility]  Cell [phone]  Office [phone]  Pager [phone]  (he/him/his)    Pulmonary Telephone Note    TTE is unremarkable. Lung windows on coronary CT were normal. Pulmonary function completely normal. Called Brenna. She continues to have dyspnea related to pain going up into the neck. Her issue is primarily DJD of the spine, including cervical and lumbar. Offered pulmonary rehab, which she may be interested in, though she may be unable due to caring for her  who has dementia. I will place the referral and see if the locations/schedules will work for her. She will need ongoing follow-up and management of spinal DJD.

## 2022-07-07 NOTE — TELEPHONE ENCOUNTER
Pulmonary Telephone Note    TTE is unremarkable. Lung windows on coronary CT were normal. Pulmonary function completely normal. Called Brenna. She continues to have dyspnea related to pain going up into the neck. Her issue is primarily DJD of the spine, including cervical and lumbar. Offered pulmonary rehab, which she may be interested in, though she may be unable due to caring for her  who has dementia. I will place the referral and see if the locations/schedules will work for her. She will need ongoing follow-up and management of spinal DJD.    Jose Angel Blanco MD  Pulmonary and Critical Care Medicine  [facility]  Office [phone]  Pager [phone]  (he/him/his)

## 2022-07-13 ENCOUNTER — OFFICE VISIT (OUTPATIENT)
Dept: CARDIOLOGY | Facility: CLINIC | Age: 87
End: 2022-07-13
Payer: COMMERCIAL

## 2022-07-13 VITALS
BODY MASS INDEX: 28.72 KG/M2 | OXYGEN SATURATION: 95 % | WEIGHT: 152 LBS | DIASTOLIC BLOOD PRESSURE: 90 MMHG | HEART RATE: 94 BPM | SYSTOLIC BLOOD PRESSURE: 137 MMHG

## 2022-07-13 DIAGNOSIS — I25.10 CORONARY ARTERY DISEASE INVOLVING NATIVE CORONARY ARTERY OF NATIVE HEART WITHOUT ANGINA PECTORIS: ICD-10-CM

## 2022-07-13 DIAGNOSIS — R06.09 DOE (DYSPNEA ON EXERTION): Primary | ICD-10-CM

## 2022-07-13 LAB
ERYTHROCYTE [DISTWIDTH] IN BLOOD BY AUTOMATED COUNT: 14.9 % (ref 10–15)
HCT VFR BLD AUTO: 44 % (ref 35–47)
HGB BLD-MCNC: 13.9 G/DL (ref 11.7–15.7)
MCH RBC QN AUTO: 30.1 PG (ref 26.5–33)
MCHC RBC AUTO-ENTMCNC: 31.6 G/DL (ref 31.5–36.5)
MCV RBC AUTO: 95 FL (ref 78–100)
NT-PROBNP SERPL-MCNC: 732 PG/ML (ref 0–1800)
PLATELET # BLD AUTO: 281 10E3/UL (ref 150–450)
RBC # BLD AUTO: 4.62 10E6/UL (ref 3.8–5.2)
WBC # BLD AUTO: 6 10E3/UL (ref 4–11)

## 2022-07-13 PROCEDURE — 83880 ASSAY OF NATRIURETIC PEPTIDE: CPT | Performed by: INTERNAL MEDICINE

## 2022-07-13 PROCEDURE — 36415 COLL VENOUS BLD VENIPUNCTURE: CPT | Performed by: INTERNAL MEDICINE

## 2022-07-13 PROCEDURE — 99215 OFFICE O/P EST HI 40 MIN: CPT | Performed by: INTERNAL MEDICINE

## 2022-07-13 PROCEDURE — 85027 COMPLETE CBC AUTOMATED: CPT | Performed by: INTERNAL MEDICINE

## 2022-07-13 NOTE — NURSING NOTE
"Chief Complaint   Patient presents with     RECHECK     CAD return.        Initial BP (!) 137/90 (BP Location: Left arm, Patient Position: Chair, Cuff Size: Adult Large)   Pulse 94   Wt 68.9 kg (152 lb)   SpO2 95%   BMI 28.72 kg/m   Estimated body mass index is 28.72 kg/m  as calculated from the following:    Height as of 6/1/22: 1.549 m (5' 1\").    Weight as of this encounter: 68.9 kg (152 lb)..  BP completed using cuff size: regular    RENE Phillips  "

## 2022-07-13 NOTE — PATIENT INSTRUCTIONS
Thank you for coming to the [facility] [place]; please note the following instructions:    1. Labs today    2. Treadmill stress test. Call [phone] to schedule    3. Dr. Cordero Can would like you to return for a cardiac follow up in 1 year  (July).  We will contact you regarding your appointment when the time draws closer or you may call [phone] option #1 to arrange an appointment.  Mean while, if you should have any questions or concerns regarding your heart health, please contact us.  Thank you for choosing [facility] for your care.         If you have any questions regarding your visit please contact your care team:     Cardiology  Telephone Number   Shelley STEVE., RN  Lisbet ZARATE, RN   Helena LARA, RMA  Chelsie RUBIO, RMDANIS Carnes., NINOSKA CHACON, Visit Facilitator   [phone] (option 1)   For scheduling appts:     [phone] (select option 1)       For the Device Clinic (Pacemakers and ICD's)  RN's :  Stella Campbell   During business hours: [phone]    *After business hours:  [phone] (select option 4)      Normal test result notifications will be released via [employer] or mailed within 7 business days.  All other test results, will be communicated via telephone once reviewed by your cardiologist.    If you need a medication refill please contact your pharmacy.  Please allow 3 business days for your refill to be completed.    As always, thank you for trusting us with your health care needs!

## 2022-07-13 NOTE — LETTER
July 15, 2022      Brenna Watkins  [address] [place], [place] [id number]        Dear ,    We are writing to inform you of your test results.    Brenna,     These tests are normal. Blood count is normal. No high pressure in the heart.            Resulted Orders   N terminal pro BNP outpatient   Result Value Ref Range    N Terminal Pro BNP Outpatient 732 0 - 1,800 pg/mL      Comment:      Reference range shown and results flagged as abnormal are for the outpatient, non acute settings. Establishing a baseline value for each individual patient is useful for follow-up.    Suggested inpatient cut points for confirming diagnosis of CHF in an acute setting are:  >450 pg/mL (age 18 to less than 50)  >900 pg/mL (age 50 to less than 75)  >1800 pg/mL (75 yrs and older)    An inpatient or emergency department NT-proPBNP <300 pg/mL effectively rules out acute CHF, with 99% negative predictive value.       CBC with platelets   Result Value Ref Range    WBC Count 6.0 4.0 - 11.0 10e3/uL    RBC Count 4.62 3.80 - 5.20 10e6/uL    Hemoglobin 13.9 11.7 - 15.7 g/dL    Hematocrit 44.0 35.0 - 47.0 %    MCV 95 78 - 100 fL    MCH 30.1 26.5 - 33.0 pg    MCHC 31.6 31.5 - 36.5 g/dL    RDW 14.9 10.0 - 15.0 %    Platelet Count 281 150 - 450 10e3/uL       If you have any questions or concerns, please call the clinic at the number listed above.       Sincerely,      Gil Willson MD

## 2022-07-13 NOTE — LETTER
2022      RE: Brenna Watkins  [address] [id number]       Dear Colleague,     Thank you for the opportunity to participate in the care of your patient, Brenna Watkins, at the [facility]. Please see a copy of my visit note below.    Referring provider: Jocelin Guajardo MD    HPI: Ms. Brenna Watkins is a 86 year old  female with PMH significant for   -Hypertension (new diagnosis) currently not on treatment    Patient is being seen today at request of Dr. Guajardo for dyspnea on exertion which has been gradually worsening over the last few years.  Particularly over the last 1 year she is having difficulty to walk with her friends even level ground.  She feels short of breath climbing stairs. Denies chest pain, palpitations, dizziness, syncope or lower extremity edema.  She tells me that she sometimes wakes up at night with shortness of breath.  Patient was recommended lisinopril 5 mg for hypertension when she was last seen on 3/1/2021 by Dr. Guajardo.  Today she tells me that she has not received any prescription.      She had an extensive work-up for dyspnea on exertion in 2017 and 2018 with spirometry, chest CT PE and stress echocardiogram which were all were unremarkable.  Most recent lab tests including CBC, BMP and TSH were all normal.    She has no history of diabetes mellitus, hyperlipidemia, coronary artery disease or any cardiac arrhythmia.  Patient tells me that 4 of her brothers  of MI younger than 65 years old.    No prior history of cardiac disease.  Lifetime non-smoker.    Patient is currently on omeprazole but no other medications.    Stress echocardiogram in 2017 showed a structurally normal heart with no inducible ischemia.  EKG 2018 shows sinus rhythm otherwise normal significant change.    Medications, personal, family, and social history reviewed with patient and  revised.    Interval history 12/7/2021:  Patient is being seen today for follow-up.  As you know when I first saw her in April of this year she was complaining of shortness of breath with exertion. A coronary CT was pursued.  This showed diffuse CAD with possible obstructive CAD.  As such, coronary angiogram was completed 6/1/2021. This showed obstructive CAD of the RCA and LCx.  She underwent TIFFANY x1 to both areas. She was discharged the same day.   After the PCI she was seen in June of this year for follow-up.  At that time she continued to report shortness of breath.    Today patient tells me that she is having a lot of back pain and neck pain.  She has been waiting to get a spinal injection for back pain but since she had PCI we have postponed the procedure.  Continues to report dyspnea on exertion.  No change in the symptoms since PCI.  No chest pain, dizziness, syncope or lower extremity edema.    Interval history 7/13/2022:  Patient is being seen today for 6-month follow-up.  She tells me that she still has shortness of breath with exertion which occurs when she is walking or exercising.  She has to stop frequently to catch her breath.  Symptoms has not changed since he had PCI.  Patient is able to walk for half an hour, exercise with the machines and do house chores.  Denies chest pain or any other concerning cardiac symptoms at this time.  She tells me that her  has Alzheimer's and she is taking care of her .  She has recently seen pulmonary and underwent pulmonary function test.  PFT and chest imaging were normal.  Patient's shortness of breath with activity was not attributed to any lung pathology.  She had recent echocardiogram which shows normal biventricular function with no significant valve disease.  Patient tells me that she has history of bilateral carpal tunnel syndrome.  PAST MEDICAL HISTORY:  Past Medical History:   Diagnosis Date     Brain aneurysm      Bunion      CAD (coronary  artery disease)      CTS (carpal tunnel syndrome)      DDD (degenerative disc disease), cervical      GERD without esophagitis      Hyperlipidemia LDL goal <70      Hypertension goal BP (blood pressure) < 140/90 07/05/2017     Osteopenia        CURRENT MEDICATIONS:  Current Outpatient Medications   Medication Sig Dispense Refill     aspirin (ASA) 81 MG chewable tablet Take 1 tablet (81 mg) by mouth daily 90 tablet 3     atorvastatin (LIPITOR) 40 MG tablet Take 1 tablet (40 mg) by mouth daily 90 tablet 3     diphenhydrAMINE-acetaminophen (TYLENOL PM)  MG tablet Take 2 tablets by mouth nightly as needed for sleep       metoprolol succinate ER (TOPROL-XL) 25 MG 24 hr tablet Take 0.5 tablets (12.5 mg) by mouth daily (Patient not taking: Reported on 6/1/2022) 45 tablet 3     multivitamin w/minerals (THERA-VIT-M) tablet Take 1 tablet by mouth daily       Omeprazole (PRILOSEC PO) Take 20 mg by mouth daily         PAST SURGICAL HISTORY:  Past Surgical History:   Procedure Laterality Date     APPENDECTOMY       AS REVISE ULNAR NERVE AT ELBOW  2018     BACK SURGERY  2015     CARPAL TUNNEL RELEASE RT/LT Right      CRANIOTOMY, REPAIR ANEURYSM, COMBINED       CV CORONARY ANGIOGRAM N/A 6/1/2021    Procedure: CV CORONARY ANGIOGRAM;  Surgeon: Syed Zayas MD;  Location:  HEART CARDIAC CATH LAB     CV PCI STENT DRUG ELUTING N/A 6/1/2021    Procedure: Percutaneous Coronary Intervention Stent Drug Eluting;  Surgeon: Syed Zayas MD;  Location: [facility] CARDIAC CATH LAB     IR CAROTID ANGIOGRAM  1/26/2012     IR CAROTID ANGIOGRAM  1/26/2012     IR MISCELLANEOUS PROCEDURE  1/26/2012     STRIP VEIN       TOE SURGERY  03/2018       ALLERGIES:     Allergies   Allergen Reactions     Pcn [Penicillins]        FAMILY HISTORY:  Family History   Problem Relation Age of Onset     Sudden Death Mother         d. childbirth      Alcoholism Father 62     Myocardial Infarction Brother 68     Alzheimer  Disease Sister      Diabetes No family hx of      Cancer No family hx of          SOCIAL HISTORY:  Social History     Tobacco Use     Smoking status: Never Smoker     Smokeless tobacco: Never Used   Substance Use Topics     Alcohol use: Yes     Alcohol/week: 1.0 - 2.0 standard drink     Types: 1 - 2 Glasses of wine per week     Drug use: No       ROS:   Constitutional: No fever, chills, or sweats. Weight stable.   Cardiovascular: As per HPI.       Exam:  BP (!) 137/90 (BP Location: Left arm, Patient Position: Chair, Cuff Size: Adult Large)   Pulse 94   Wt 68.9 kg (152 lb)   SpO2 95%   BMI 28.72 kg/m    GENERAL APPEARANCE: alert and no distress  HEENT: no icterus, no central cyanosis  LYMPH/NECK: no adenopathy, no asymmetry, JVP not elevated.  RESPIRATORY: lungs clear to auscultation - no rales, rhonchi or wheezes, no use of accessory muscles, no retractions, respirations are unlabored, normal respiratory rate  CARDIOVASCULAR: regular rhythm, normal S1, S2, no S3 or S4 and no murmur, click or rub, precordium quiet with normal PMI.  EXTREMITIES: no edema, varicose veins noted+  NEURO: alert, normal speech,and affect  SKIN: no ecchymoses, no rashes     I have reviewed the labs and personally reviewed the imaging below and made my comment in the assessment and plan.    Labs:  CBC RESULTS:   Lab Results   Component Value Date    WBC 7.8 06/01/2021    RBC 4.62 06/01/2021    HGB 13.2 06/01/2022    HGB 14.0 06/01/2021    HCT 44.0 06/01/2021    MCV 95 06/01/2021    MCH 30.3 06/01/2021    MCHC 31.8 06/01/2021    RDW 14.2 06/01/2021     06/01/2021       BMP RESULTS:  Lab Results   Component Value Date     05/03/2022     06/01/2021    POTASSIUM 4.4 05/03/2022    POTASSIUM 4.2 06/01/2021    CHLORIDE 108 05/03/2022    CHLORIDE 106 06/01/2021    CO2 25 05/03/2022    CO2 29 06/01/2021    ANIONGAP 9 05/03/2022    ANIONGAP 3 06/01/2021    GLC 94 05/03/2022    GLC 88 06/01/2021    BUN 15 05/03/2022    BUN 21  06/01/2021    CR 0.91 05/03/2022    CR 0.92 06/01/2021    GFRESTIMATED 62 05/03/2022    GFRESTIMATED 57 (L) 06/01/2021    GFRESTBLACK 66 06/01/2021    BAUTISTA 9.5 05/03/2022    BAUTISTA 9.2 06/01/2021     Echocardiogram 6/30/2022  The left ventricle is normal in size with borderline concentric left  ventricular hypertrophy.  Left ventricular function is normal.The ejection fraction is 60-65%.  Normal right ventricle size and systolic function.  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  Compared to the prior study dated 5/10/21, there have been no changes.    Coronary angiogram 6/1/2021  2 vessel severe CAD involving the RCA and LCx. Otherwise mild non obstructive CAD with severely tortuous coronaries.  PCI with two drug eluting stents (LCx and RCA).    CT coronary angiogram 5/12/2021  1.  Diffuse coronary atherosclerosis, possibly with 3 vessel  obstructive CAD. Recommend invasive evaluation.  2.  Total Agatston score 249 placing the patient in the 59 percentile  when compared to age and gender matched control group.    Echocardiogram 5/10/2021  Global and regional left ventricular function is normal with an EF of 60-65%.  Right ventricular function, chamber size, wall motion, and thickness are  normal.  Both atria appear normal.  Pulmonary artery systolic pressure is normal.  The inferior vena cava is normal.  No pericardial effusion is present.    Spirometry 2017: Normal  Echocardiogram 2017  Normal exercise echocardiogram without evidence of inducible ischemia.  Sensitivity of the test is reduced since target heart rate was not achieved.  With stress, the left ventricular ejection fraction increased from 55-60% to  greater than 65% and the left ventricular size decreased appropriately.  There was no ECG evidence of ischemia. Heart rate response to exercise was  normal, with hypertensive BP response.  No subjective symptoms to suggest ischemia.  No significant  valve disease on screening doppler evaluation. The aortic root  and visualized ascending aorta are normal.    CT PE 2018: Lungs clear.  No evidence of pulmonary embolism.    EKG 2018 sinus rhythm otherwise unremarkable        Assessment and Plan:     #CAD S/P PCI with two drug eluting stents (LCx and RCA) on 6/1/2021  -Continues to report BEARDEN with physical activity which is unchanged since PCI.  She has recently seen pulmonary.  No pulmonary etiology.  PFT is normal.  Chest imaging is unremarkable.  Recent echocardiogram is also unremarkable.  She is currently on aspirin and atorvastatin.  Blood pressure is normal.  Euvolemic on exam.  She is not deconditioned.  Unclear etiology for BEARDEN.  -Recommended exercise treadmill test to check for chronotropic incompetence  -Check NT proBNP and CBC    Return to clinic 1 year.    Total time spent today for this visit is 40 minutes including precharting, face-to-face clinic visit, review of labs/imaging and medical documentation.    Please donot hesitate to contact me if you have any questions or concerns. Again, thank you for allowing me to participate in the care of your patient.    Gil MARINO MD  [facility] Division of Cardiology  Pager [phone]

## 2022-07-13 NOTE — PROGRESS NOTES
Referring provider: Jocelin Guajardo MD    HPI: Ms. Brenna Watkins is a 86 year old  female with PMH significant for   -Hypertension (new diagnosis) currently not on treatment    Patient is being seen today at request of Dr. Guajardo for dyspnea on exertion which has been gradually worsening over the last few years.  Particularly over the last 1 year she is having difficulty to walk with her friends even level ground.  She feels short of breath climbing stairs. Denies chest pain, palpitations, dizziness, syncope or lower extremity edema.  She tells me that she sometimes wakes up at night with shortness of breath.  Patient was recommended lisinopril 5 mg for hypertension when she was last seen on 3/1/2021 by Dr. Guajardo.  Today she tells me that she has not received any prescription.      She had an extensive work-up for dyspnea on exertion in 2017 and 2018 with spirometry, chest CT PE and stress echocardiogram which were all were unremarkable.  Most recent lab tests including CBC, BMP and TSH were all normal.    She has no history of diabetes mellitus, hyperlipidemia, coronary artery disease or any cardiac arrhythmia.  Patient tells me that 4 of her brothers  of MI younger than 65 years old.    No prior history of cardiac disease.  Lifetime non-smoker.    Patient is currently on omeprazole but no other medications.    Stress echocardiogram in 2017 showed a structurally normal heart with no inducible ischemia.  EKG 2018 shows sinus rhythm otherwise normal significant change.    Medications, personal, family, and social history reviewed with patient and revised.    Interval history 2021:  Patient is being seen today for follow-up.  As you know when I first saw her in April of this year she was complaining of shortness of breath with exertion. A coronary CT was pursued.  This showed diffuse CAD with possible obstructive CAD.  As such, coronary angiogram was completed 2021. This showed obstructive  CAD of the RCA and LCx.  She underwent TIFFANY x1 to both areas. She was discharged the same day.   After the PCI she was seen in June of this year for follow-up.  At that time she continued to report shortness of breath.    Today patient tells me that she is having a lot of back pain and neck pain.  She has been waiting to get a spinal injection for back pain but since she had PCI we have postponed the procedure.  Continues to report dyspnea on exertion.  No change in the symptoms since PCI.  No chest pain, dizziness, syncope or lower extremity edema.    Interval history 7/13/2022:  Patient is being seen today for 6-month follow-up.  She tells me that she still has shortness of breath with exertion which occurs when she is walking or exercising.  She has to stop frequently to catch her breath.  Symptoms has not changed since he had PCI.  Patient is able to walk for half an hour, exercise with the machines and do house chores.  Denies chest pain or any other concerning cardiac symptoms at this time.  She tells me that her  has Alzheimer's and she is taking care of her .  She has recently seen pulmonary and underwent pulmonary function test.  PFT and chest imaging were normal.  Patient's shortness of breath with activity was not attributed to any lung pathology.  She had recent echocardiogram which shows normal biventricular function with no significant valve disease.  Patient tells me that she has history of bilateral carpal tunnel syndrome.  PAST MEDICAL HISTORY:  Past Medical History:   Diagnosis Date     Brain aneurysm      Bunion      CAD (coronary artery disease)      CTS (carpal tunnel syndrome)      DDD (degenerative disc disease), cervical      GERD without esophagitis      Hyperlipidemia LDL goal <70      Hypertension goal BP (blood pressure) < 140/90 07/05/2017     Osteopenia        CURRENT MEDICATIONS:  Current Outpatient Medications   Medication Sig Dispense Refill     aspirin (ASA) 81 MG  chewable tablet Take 1 tablet (81 mg) by mouth daily 90 tablet 3     atorvastatin (LIPITOR) 40 MG tablet Take 1 tablet (40 mg) by mouth daily 90 tablet 3     diphenhydrAMINE-acetaminophen (TYLENOL PM)  MG tablet Take 2 tablets by mouth nightly as needed for sleep       metoprolol succinate ER (TOPROL-XL) 25 MG 24 hr tablet Take 0.5 tablets (12.5 mg) by mouth daily (Patient not taking: Reported on 6/1/2022) 45 tablet 3     multivitamin w/minerals (THERA-VIT-M) tablet Take 1 tablet by mouth daily       Omeprazole (PRILOSEC PO) Take 20 mg by mouth daily         PAST SURGICAL HISTORY:  Past Surgical History:   Procedure Laterality Date     APPENDECTOMY       AS REVISE ULNAR NERVE AT ELBOW  2018     BACK SURGERY  2015     CARPAL TUNNEL RELEASE RT/LT Right      CRANIOTOMY, REPAIR ANEURYSM, COMBINED       CV CORONARY ANGIOGRAM N/A 6/1/2021    Procedure: CV CORONARY ANGIOGRAM;  Surgeon: Syed Zayas MD;  Location: [facility] CARDIAC CATH LAB     CV PCI STENT DRUG ELUTING N/A 6/1/2021    Procedure: Percutaneous Coronary Intervention Stent Drug Eluting;  Surgeon: Syed Zayas MD;  Location: [facility] CARDIAC CATH LAB     IR CAROTID ANGIOGRAM  1/26/2012     IR CAROTID ANGIOGRAM  1/26/2012     IR MISCELLANEOUS PROCEDURE  1/26/2012     STRIP VEIN       TOE SURGERY  03/2018       ALLERGIES:     Allergies   Allergen Reactions     Pcn [Penicillins]        FAMILY HISTORY:  Family History   Problem Relation Age of Onset     Sudden Death Mother         d. childbirth      Alcoholism Father 62     Myocardial Infarction Brother 68     Alzheimer Disease Sister      Diabetes No family hx of      Cancer No family hx of          SOCIAL HISTORY:  Social History     Tobacco Use     Smoking status: Never Smoker     Smokeless tobacco: Never Used   Substance Use Topics     Alcohol use: Yes     Alcohol/week: 1.0 - 2.0 standard drink     Types: 1 - 2 Glasses of wine per week     Drug use: No       ROS:    Constitutional: No fever, chills, or sweats. Weight stable.   Cardiovascular: As per HPI.       Exam:  BP (!) 137/90 (BP Location: Left arm, Patient Position: Chair, Cuff Size: Adult Large)   Pulse 94   Wt 68.9 kg (152 lb)   SpO2 95%   BMI 28.72 kg/m    GENERAL APPEARANCE: alert and no distress  HEENT: no icterus, no central cyanosis  LYMPH/NECK: no adenopathy, no asymmetry, JVP not elevated.  RESPIRATORY: lungs clear to auscultation - no rales, rhonchi or wheezes, no use of accessory muscles, no retractions, respirations are unlabored, normal respiratory rate  CARDIOVASCULAR: regular rhythm, normal S1, S2, no S3 or S4 and no murmur, click or rub, precordium quiet with normal PMI.  EXTREMITIES: no edema, varicose veins noted+  NEURO: alert, normal speech,and affect  SKIN: no ecchymoses, no rashes     I have reviewed the labs and personally reviewed the imaging below and made my comment in the assessment and plan.    Labs:  CBC RESULTS:   Lab Results   Component Value Date    WBC 7.8 06/01/2021    RBC 4.62 06/01/2021    HGB 13.2 06/01/2022    HGB 14.0 06/01/2021    HCT 44.0 06/01/2021    MCV 95 06/01/2021    MCH 30.3 06/01/2021    MCHC 31.8 06/01/2021    RDW 14.2 06/01/2021     06/01/2021       BMP RESULTS:  Lab Results   Component Value Date     05/03/2022     06/01/2021    POTASSIUM 4.4 05/03/2022    POTASSIUM 4.2 06/01/2021    CHLORIDE 108 05/03/2022    CHLORIDE 106 06/01/2021    CO2 25 05/03/2022    CO2 29 06/01/2021    ANIONGAP 9 05/03/2022    ANIONGAP 3 06/01/2021    GLC 94 05/03/2022    GLC 88 06/01/2021    BUN 15 05/03/2022    BUN 21 06/01/2021    CR 0.91 05/03/2022    CR 0.92 06/01/2021    GFRESTIMATED 62 05/03/2022    GFRESTIMATED 57 (L) 06/01/2021    GFRESTBLACK 66 06/01/2021    BAUTISTA 9.5 05/03/2022    BAUTISTA 9.2 06/01/2021     Echocardiogram 6/30/2022  The left ventricle is normal in size with borderline concentric left  ventricular hypertrophy.  Left ventricular function is normal.The  ejection fraction is 60-65%.  Normal right ventricle size and systolic function.  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  Compared to the prior study dated 5/10/21, there have been no changes.    Coronary angiogram 6/1/2021  2 vessel severe CAD involving the RCA and LCx. Otherwise mild non obstructive CAD with severely tortuous coronaries.  PCI with two drug eluting stents (LCx and RCA).    CT coronary angiogram 5/12/2021  1.  Diffuse coronary atherosclerosis, possibly with 3 vessel  obstructive CAD. Recommend invasive evaluation.  2.  Total Agatston score 249 placing the patient in the 59 percentile  when compared to age and gender matched control group.    Echocardiogram 5/10/2021  Global and regional left ventricular function is normal with an EF of 60-65%.  Right ventricular function, chamber size, wall motion, and thickness are  normal.  Both atria appear normal.  Pulmonary artery systolic pressure is normal.  The inferior vena cava is normal.  No pericardial effusion is present.    Spirometry 2017: Normal  Echocardiogram 2017  Normal exercise echocardiogram without evidence of inducible ischemia.  Sensitivity of the test is reduced since target heart rate was not achieved.  With stress, the left ventricular ejection fraction increased from 55-60% to  greater than 65% and the left ventricular size decreased appropriately.  There was no ECG evidence of ischemia. Heart rate response to exercise was  normal, with hypertensive BP response.  No subjective symptoms to suggest ischemia.  No significant valve disease on screening doppler evaluation. The aortic root  and visualized ascending aorta are normal.    CT PE 2018: Lungs clear.  No evidence of pulmonary embolism.    EKG 2018 sinus rhythm otherwise unremarkable        Assessment and Plan:     #CAD S/P PCI with two drug eluting stents (LCx and RCA) on 6/1/2021  -Continues to report BEARDEN with physical  activity which is unchanged since PCI.  She has recently seen pulmonary.  No pulmonary etiology.  PFT is normal.  Chest imaging is unremarkable.  Recent echocardiogram is also unremarkable.  She is currently on aspirin and atorvastatin.  Blood pressure is normal.  Euvolemic on exam.  She is not deconditioned.  Unclear etiology for BEARDEN.  -Recommended exercise treadmill test to check for chronotropic incompetence  -Check NT proBNP and CBC    Return to clinic 1 year.    Total time spent today for this visit is 40 minutes including precharting, face-to-face clinic visit, review of labs/imaging and medical documentation.    Please donot hesitate to contact me if you have any questions or concerns. Again, thank you for allowing me to participate in the care of your patient.    Gil MARINO MD  [facility] Division of Cardiology  Pager [phone]

## 2022-07-27 ENCOUNTER — TRANSFERRED RECORDS (OUTPATIENT)
Dept: FAMILY MEDICINE | Facility: CLINIC | Age: 87
End: 2022-07-27

## 2023-01-25 ENCOUNTER — TRANSFERRED RECORDS (OUTPATIENT)
Dept: HEALTH INFORMATION MANAGEMENT | Facility: CLINIC | Age: 88
End: 2023-01-25

## 2023-04-04 ENCOUNTER — TELEPHONE (OUTPATIENT)
Dept: CARDIOLOGY | Facility: CLINIC | Age: 88
End: 2023-04-04
Payer: COMMERCIAL

## 2023-04-04 NOTE — TELEPHONE ENCOUNTER
Called patient from the [facility] to schedule yearly return with Dr. Willson in July. Patient informed me she has been having increased BEARDEN, as well as neck and throat tightness. I scheduled patient next available on 5/17. I informed her I would pass her symptoms on to an RN for further evaluation.    RENE Phillips

## 2023-04-05 NOTE — TELEPHONE ENCOUNTER
Call back to pt: no answer. Left vm returning her call.   Contact number left.     Last note:   7/13/2022:  Patient is being seen today for 6-month follow-up.  She tells me that she still has shortness of breath with exertion which occurs when she is walking or exercising.  She has to stop frequently to catch her breath.  Symptoms has not changed since he had PCI.  Patient is able to walk for half an hour, exercise with the machines and do house chores.  Denies chest pain or any other concerning cardiac symptoms at this time.  She tells me that her  has Alzheimer's and she is taking care of her .  She has recently seen pulmonary and underwent pulmonary function test.  PFT and chest imaging were normal.  Patient's shortness of breath with activity was not attributed to any lung pathology.  She had recent echocardiogram which shows normal biventricular function with no significant valve disease.  Patient tells me that she has history of bilateral carpal tunnel syndrome.

## 2023-04-06 NOTE — TELEPHONE ENCOUNTER
Spoke to patient who reports that she is short of breath with exertion but this has not worsened since she seen Dr. Willson in July 2022.  She feels some discomfort at times with her throat when she gets short of breath and she believes this is new.  She is able to do her normal daily activities and just got back from grocery store.  She is talking in full, complete understandable sentences.  RN reviewed Dr. Rizzo note.  She ordered a stress test which was never scheduled.  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  Patient does not want to have any testing at this time and wants to discuss this with dr. Willson in clinic first which is scheduled for 5/17.   Shelley Garcia RN  Cardiology Care Coordinator  [facility]  [phone] option 1

## 2023-04-20 ENCOUNTER — PATIENT OUTREACH (OUTPATIENT)
Dept: CARE COORDINATION | Facility: CLINIC | Age: 88
End: 2023-04-20
Payer: COMMERCIAL

## 2023-04-29 DIAGNOSIS — I25.118 CORONARY ARTERY DISEASE OF NATIVE ARTERY OF NATIVE HEART WITH STABLE ANGINA PECTORIS (H): ICD-10-CM

## 2023-04-29 DIAGNOSIS — I10 HYPERTENSION GOAL BP (BLOOD PRESSURE) < 140/90: ICD-10-CM

## 2023-05-01 ENCOUNTER — TELEPHONE (OUTPATIENT)
Dept: CARDIOLOGY | Facility: CLINIC | Age: 88
End: 2023-05-01
Payer: COMMERCIAL

## 2023-05-01 DIAGNOSIS — I25.10 CORONARY ARTERY DISEASE INVOLVING NATIVE CORONARY ARTERY OF NATIVE HEART WITHOUT ANGINA PECTORIS: ICD-10-CM

## 2023-05-01 DIAGNOSIS — R06.09 DOE (DYSPNEA ON EXERTION): Primary | ICD-10-CM

## 2023-05-01 RX ORDER — METOPROLOL SUCCINATE 25 MG/1
TABLET, EXTENDED RELEASE ORAL
Qty: 45 TABLET | Refills: 0 | Status: SHIPPED | OUTPATIENT
Start: 2023-05-01 | End: 2023-05-24

## 2023-05-01 NOTE — TELEPHONE ENCOUNTER
[facility] Call Center    Phone Message    May a detailed message be left on voicemail: yes     Reason for Call: Other: Patient called and will like to speak with someone on the care team. Patient has an appt scheduled on 05/17 with Dr. Willson in [place], but unsure if they can wait that long since she is experiencing some pain from her neck to her ear. Patient is wondering if it is possible to see another provider/MARTHA that may have an earlier availability in the morning at [place] location. Please call patient back to further discuss.      Action Taken: Other: Cardiology    Travel Screening: Not Applicable     Thank you!  [facility]

## 2023-05-02 NOTE — TELEPHONE ENCOUNTER
We can get a baseline echocardiogram until I see her.    Can you order please.  No stress test.    DR MARINO

## 2023-05-02 NOTE — TELEPHONE ENCOUNTER
Spoke to patient and discussed MD recommendations and patient is agreeable with the plan.  Echo scheduled for 5/15.  Advised patient that if her symptoms start to worsen she should go to the ED to be evaluated.  Patient verbalized understanding.    Shelley Garcia RN  Cardiology Care Coordinator  [facility]  [phone] option 1

## 2023-05-02 NOTE — TELEPHONE ENCOUNTER
"Spoke to patient.  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  Her main complaint is that when she walks with her  is shortness of breath and mid chest discomfort that is \"near my throat\" that radiates to her neck and ear.  These symptoms have been ongoing. She will have to stop walking and rest and symptoms will subside.  Symptoms will occur approx 5 minutes after walking. When asked if her symptoms have worsened since the summer she says no.  States that at times her shortness of breath can occur at rest and she has to take a deep breath.  This can occur while lying down but not all the time.  Also wears compression stockings to help with her ankle edema.      She is scheduled to see Dr. Willson on 5/17.  Wants to be seen sooner.  Discussed testing.  treadmill stress test was ordered July 2022 to rule out chronotropic incompetence but patient states she has sciatica and does not feel she will be able to the treadmill.    Hx of CAD.   PFT normal 2022.  6/30/22 echo shows normal heart function    Patient not keen to do cardiac testing at the [place] unless Dr. Willson feels it would be the best thing to do.    Dr. Willson, any recommendations prior to seeing her on 5/17?    Shelley Garcia, RN  Cardiology Care Coordinator  [facility]  [phone] option 1        "

## 2023-05-04 ENCOUNTER — PATIENT OUTREACH (OUTPATIENT)
Dept: CARE COORDINATION | Facility: CLINIC | Age: 88
End: 2023-05-04
Payer: COMMERCIAL

## 2023-05-15 ENCOUNTER — ANCILLARY PROCEDURE (OUTPATIENT)
Dept: CARDIOLOGY | Facility: CLINIC | Age: 88
End: 2023-05-15
Attending: INTERNAL MEDICINE
Payer: COMMERCIAL

## 2023-05-15 DIAGNOSIS — R06.09 DOE (DYSPNEA ON EXERTION): ICD-10-CM

## 2023-05-15 DIAGNOSIS — I25.10 CORONARY ARTERY DISEASE INVOLVING NATIVE CORONARY ARTERY OF NATIVE HEART WITHOUT ANGINA PECTORIS: ICD-10-CM

## 2023-05-15 LAB — LVEF ECHO: NORMAL

## 2023-05-15 PROCEDURE — 93306 TTE W/DOPPLER COMPLETE: CPT | Performed by: INTERNAL MEDICINE

## 2023-05-16 PROBLEM — Z71.89 ADVANCED DIRECTIVES, COUNSELING/DISCUSSION: Status: RESOLVED | Noted: 2017-06-13 | Resolved: 2023-05-16

## 2023-05-17 ENCOUNTER — OFFICE VISIT (OUTPATIENT)
Dept: CARDIOLOGY | Facility: CLINIC | Age: 88
End: 2023-05-17
Payer: COMMERCIAL

## 2023-05-17 VITALS
HEART RATE: 85 BPM | SYSTOLIC BLOOD PRESSURE: 127 MMHG | OXYGEN SATURATION: 100 % | DIASTOLIC BLOOD PRESSURE: 82 MMHG | WEIGHT: 156.6 LBS | BODY MASS INDEX: 29.59 KG/M2

## 2023-05-17 DIAGNOSIS — R06.09 DOE (DYSPNEA ON EXERTION): ICD-10-CM

## 2023-05-17 DIAGNOSIS — I25.10 CORONARY ARTERY DISEASE INVOLVING NATIVE CORONARY ARTERY OF NATIVE HEART WITHOUT ANGINA PECTORIS: Primary | ICD-10-CM

## 2023-05-17 PROCEDURE — 99215 OFFICE O/P EST HI 40 MIN: CPT | Performed by: INTERNAL MEDICINE

## 2023-05-17 NOTE — PROGRESS NOTES
Referring provider: Jocelin Guajardo MD    HPI: Ms. Brenna Watkins is a 86 year old  female with PMH significant for   -Hypertension (new diagnosis) currently not on treatment    Patient is being seen today at request of Dr. Guajardo for dyspnea on exertion which has been gradually worsening over the last few years.  Particularly over the last 1 year she is having difficulty to walk with her friends even level ground.  She feels short of breath climbing stairs. Denies chest pain, palpitations, dizziness, syncope or lower extremity edema.  She tells me that she sometimes wakes up at night with shortness of breath.  Patient was recommended lisinopril 5 mg for hypertension when she was last seen on 3/1/2021 by Dr. Guajardo.  Today she tells me that she has not received any prescription.      She had an extensive work-up for dyspnea on exertion in 2017 and 2018 with spirometry, chest CT PE and stress echocardiogram which were all were unremarkable.  Most recent lab tests including CBC, BMP and TSH were all normal.    She has no history of diabetes mellitus, hyperlipidemia, coronary artery disease or any cardiac arrhythmia.  Patient tells me that 4 of her brothers  of MI younger than 65 years old.    No prior history of cardiac disease.  Lifetime non-smoker.    Patient is currently on omeprazole but no other medications.    Stress echocardiogram in 2017 showed a structurally normal heart with no inducible ischemia.  EKG 2018 shows sinus rhythm otherwise normal significant change.    Medications, personal, family, and social history reviewed with patient and revised.    Interval history 2021:  Patient is being seen today for follow-up.  As you know when I first saw her in April of this year she was complaining of shortness of breath with exertion. A coronary CT was pursued.  This showed diffuse CAD with possible obstructive CAD.  As such, coronary angiogram was completed 2021. This showed obstructive  CAD of the RCA and LCx.  She underwent TIFFANY x1 to both areas. She was discharged the same day.   After the PCI she was seen in June of this year for follow-up.  At that time she continued to report shortness of breath.    Today patient tells me that she is having a lot of back pain and neck pain.  She has been waiting to get a spinal injection for back pain but since she had PCI we have postponed the procedure.  Continues to report dyspnea on exertion.  No change in the symptoms since PCI.  No chest pain, dizziness, syncope or lower extremity edema.    Interval history 7/13/2022:  Patient is being seen today for 6-month follow-up.  She tells me that she still has shortness of breath with exertion which occurs when she is walking or exercising.  She has to stop frequently to catch her breath.  Symptoms has not changed since he had PCI.  Patient is able to walk for half an hour, exercise with the machines and do house chores.  Denies chest pain or any other concerning cardiac symptoms at this time.  She tells me that her  has Alzheimer's and she is taking care of her .  She has recently seen pulmonary and underwent pulmonary function test.  PFT and chest imaging were normal.  Patient's shortness of breath with activity was not attributed to any lung pathology.  She had recent echocardiogram which shows normal biventricular function with no significant valve disease.  Patient tells me that she has history of bilateral carpal tunnel syndrome.    Interval history 5/17/2023:  Patient returns for follow-up.  She tells me she is suffering from sciatica pain which limits her physical activity.  She walks with her  and tells me that she feels tired and short of breath when she is walking.  She tells me she has been sleeping on the recliner for several years.  Denies lower extremity edema, no chest pain.  Reviewed patient's recent echocardiogram 5/15/2023 which showed normal biventricular function, no  significant valve disease and grade 2 moderate diastolic dysfunction.  IVC is normal.  She is currently on aspirin 81, Lipitor 40 mg and metoprolol 12.5 mg daily    PAST MEDICAL HISTORY:  Past Medical History:   Diagnosis Date     Brain aneurysm      Bunion      CAD (coronary artery disease)      CTS (carpal tunnel syndrome)      DDD (degenerative disc disease), cervical      GERD without esophagitis      Hyperlipidemia LDL goal <70      Hypertension goal BP (blood pressure) < 140/90 07/05/2017     Osteopenia        CURRENT MEDICATIONS:  Current Outpatient Medications   Medication Sig Dispense Refill     aspirin (ASA) 81 MG chewable tablet Take 1 tablet (81 mg) by mouth daily 90 tablet 3     atorvastatin (LIPITOR) 40 MG tablet Take 1 tablet (40 mg) by mouth daily 90 tablet 3     diphenhydrAMINE-acetaminophen (TYLENOL PM)  MG tablet Take 2 tablets by mouth nightly as needed for sleep       metoprolol succinate ER (TOPROL XL) 25 MG 24 hr tablet TAKE 1/2 TABLET(12.5 MG) BY MOUTH DAILY 45 tablet 0     multivitamin w/minerals (THERA-VIT-M) tablet Take 1 tablet by mouth daily       Omeprazole (PRILOSEC PO) Take 20 mg by mouth daily         PAST SURGICAL HISTORY:  Past Surgical History:   Procedure Laterality Date     APPENDECTOMY       AS REVISE ULNAR NERVE AT ELBOW  2018     BACK SURGERY  2015     CARPAL TUNNEL RELEASE RT/LT Right      CRANIOTOMY, REPAIR ANEURYSM, COMBINED       CV CORONARY ANGIOGRAM N/A 6/1/2021    Procedure: CV CORONARY ANGIOGRAM;  Surgeon: Syed Zayas MD;  Location:  [facility]     CV PCI STENT DRUG ELUTING N/A 6/1/2021    Procedure: Percutaneous Coronary Intervention Stent Drug Eluting;  Surgeon: Syed Zayas MD;  Location:  [facility]     IR CAROTID ANGIOGRAM  1/26/2012     IR CAROTID ANGIOGRAM  1/26/2012     IR MISCELLANEOUS PROCEDURE  1/26/2012     STRIP VEIN       TOE SURGERY  03/2018       ALLERGIES:     Allergies   Allergen  Reactions     Pcn [Penicillins]        FAMILY HISTORY:  Family History   Problem Relation Age of Onset     Sudden Death Mother         d. childbirth      Alcoholism Father 62     Myocardial Infarction Brother 68     Alzheimer Disease Sister      Diabetes No family hx of      Cancer No family hx of          SOCIAL HISTORY:  Social History     Tobacco Use     Smoking status: Never     Smokeless tobacco: Never   Substance Use Topics     Alcohol use: Yes     Alcohol/week: 1.0 - 2.0 standard drink of alcohol     Types: 1 - 2 Glasses of wine per week     Drug use: No       ROS:   Constitutional: No fever, chills, or sweats. Weight stable.   Cardiovascular: As per HPI.       Exam:  /82 (BP Location: Left arm, Patient Position: Sitting, Cuff Size: Adult Regular)   Pulse 85   Wt 71 kg (156 lb 9.6 oz)   SpO2 100%   BMI 29.59 kg/m    GENERAL APPEARANCE: alert and no distress  HEENT: no icterus, no central cyanosis  LYMPH/NECK: no adenopathy, no asymmetry, JVP not elevated.  RESPIRATORY: lungs clear to auscultation - no rales, rhonchi or wheezes, no use of accessory muscles, no retractions, respirations are unlabored, normal respiratory rate  CARDIOVASCULAR: regular rhythm, normal S1, S2, no S3 or S4 and no murmur, click or rub, precordium quiet with normal PMI.  EXTREMITIES: no edema, varicose veins noted+  NEURO: alert, normal speech,and affect  SKIN: no ecchymoses, no rashes     I have reviewed the labs and personally reviewed the imaging below and made my comment in the assessment and plan.    Labs:  CBC RESULTS:   Lab Results   Component Value Date    WBC 6.0 07/13/2022    WBC 7.8 06/01/2021    RBC 4.62 07/13/2022    RBC 4.62 06/01/2021    HGB 13.9 07/13/2022    HGB 14.0 06/01/2021    HCT 44.0 07/13/2022    HCT 44.0 06/01/2021    MCV 95 07/13/2022    MCV 95 06/01/2021    MCH 30.1 07/13/2022    MCH 30.3 06/01/2021    MCHC 31.6 07/13/2022    MCHC 31.8 06/01/2021    RDW 14.9 07/13/2022    RDW 14.2 06/01/2021    PLT  281 07/13/2022     06/01/2021       BMP RESULTS:  Lab Results   Component Value Date     05/03/2022     06/01/2021    POTASSIUM 4.4 05/03/2022    POTASSIUM 4.2 06/01/2021    CHLORIDE 108 05/03/2022    CHLORIDE 106 06/01/2021    CO2 25 05/03/2022    CO2 29 06/01/2021    ANIONGAP 9 05/03/2022    ANIONGAP 3 06/01/2021    GLC 94 05/03/2022    GLC 88 06/01/2021    BUN 15 05/03/2022    BUN 21 06/01/2021    CR 0.91 05/03/2022    CR 0.92 06/01/2021    GFRESTIMATED 62 05/03/2022    GFRESTIMATED 57 (L) 06/01/2021    GFRESTBLACK 66 06/01/2021    BAUTISTA 9.5 05/03/2022    BAUTISTA 9.2 06/01/2021     Transthoracic echocardiogram 5/15/2023  Global and regional left ventricular function is normal with an EF of 60-65%.  Mild concentric wall thickening consistent with left ventricular hypertrophy  is present.  Grade II or moderate diastolic dysfunction.  Global right ventricular function is normal.  No significant valvular abnormalities present.  Mild pulmonary hypertension is present.  IVC diameter <2.1 cm collapsing >50% with sniff suggests a normal RA pressure  of 3 mmHg.  No pericardial effusion is present.  No significant changes noted.    Echocardiogram 6/30/2022  The left ventricle is normal in size with borderline concentric left  ventricular hypertrophy.  Left ventricular function is normal.The ejection fraction is 60-65%.  Normal right ventricle size and systolic function.  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  Compared to the prior study dated 5/10/21, there have been no changes.    Coronary angiogram 6/1/2021  2 vessel severe CAD involving the RCA and LCx. Otherwise mild non obstructive CAD with severely tortuous coronaries.  PCI with two drug eluting stents (LCx and RCA).    CT coronary angiogram 5/12/2021  1.  Diffuse coronary atherosclerosis, possibly with 3 vessel  obstructive CAD. Recommend invasive evaluation.  2.  Total Agatston score 249  placing the patient in the 59 percentile  when compared to age and gender matched control group.    Echocardiogram 5/10/2021  Global and regional left ventricular function is normal with an EF of 60-65%.  Right ventricular function, chamber size, wall motion, and thickness are  normal.  Both atria appear normal.  Pulmonary artery systolic pressure is normal.  The inferior vena cava is normal.  No pericardial effusion is present.    Spirometry 2017: Normal  Echocardiogram 2017  Normal exercise echocardiogram without evidence of inducible ischemia.  Sensitivity of the test is reduced since target heart rate was not achieved.  With stress, the left ventricular ejection fraction increased from 55-60% to  greater than 65% and the left ventricular size decreased appropriately.  There was no ECG evidence of ischemia. Heart rate response to exercise was  normal, with hypertensive BP response.  No subjective symptoms to suggest ischemia.  No significant valve disease on screening doppler evaluation. The aortic root  and visualized ascending aorta are normal.    CT PE 2018: Lungs clear.  No evidence of pulmonary embolism.    EKG 2018 sinus rhythm otherwise unremarkable        Assessment and Plan:     #CAD S/P PCI with two drug eluting stents (LCx and RCA) on 6/1/2021  -Continues to report BEARDEN with physical activity which is unchanged since PCI.  She has seen pulmonary.  No pulmonary etiology.  PFT is normal.  Chest imaging is unremarkable.  Recent echocardiogram is also unremarkable except diastolic dysfunction is reported as grade 2.  I do not think she has diastolic heart failure.  Diastolic dysfunction is likely overestimated due to mitral annular calcification.  She is euvolemic on exam.  She is also suffering from sciatica which limits her physical activities.  I think likely she is having deconditioning rather than cardiac or pulmonary etiology for her shortness of breath with activity.  I do not recommend further  work-up at this time.      Return to clinic as needed.      Total time spent today for this visit is 40 minutes including precharting, face-to-face clinic visit, review of labs/imaging and medical documentation.    Please donot hesitate to contact me if you have any questions or concerns. Again, thank you for allowing me to participate in the care of your patient.    Gil MARINO MD  [facility] Division of Cardiology  Pager [phone]

## 2023-05-17 NOTE — PATIENT INSTRUCTIONS
Thank you for coming to the [facility] at [place]; please note the following instructions:    1. Please continue to monitor your blood pressure at home. When checking your blood pressure at home:  ~upper arm cuff is preferred versus wrist cuff due to accuracy  ~Sit in a chair  ~Rest for 5 minutes  ~Avoid alcohol, nicotine, caffeine, exercise, food or drink 30 minutes prior  ~urinate prior to checking if needed  ~Elbow is at about heart level  ~Feet flat on the floor, no crossing legs or feet  ~No talking, minimal movement   ~Place cuff on bare skin     2. Follow-up with primary care    3. Follow-up as needed in cardiology        If you have any questions regarding your visit, please contact your care team:     CARDIOLOGY  TELEPHONE NUMBER   Shelley WILSONRachele, Registered Nurse  Lisbet ZARATE, Registered Nurse  Chelsie RUBIO, Registered Medical Assistant  Viola WHEELER, Certified Medical Assistant  Pam CHACON, Visit Facilitator [phone] (select option 1)    *After hours: [phone]   For Scheduling Appts:     [phone] (select option 1)    *After hours: [phone]   For the Device Clinic (Pacemakers and ICD's)  Stella CALDERON, Registered Nurse   During business hours: [phone]    *After business hours:  [phone] (select option 4)      Normal test result notifications will be released via [employer] or mailed within 7 business days.  All other test results, will be communicated via telephone once reviewed by your cardiologist.    If you need a medication refill, please contact your pharmacy.  Please allow 3 business days for your refill to be completed.    As always, thank you for trusting us with your health care needs!

## 2023-05-17 NOTE — LETTER
2023      RE: Brenna Watkins  [address] [id number]       Dear Colleague,    Thank you for the opportunity to participate in the care of your patient, Brenna Watkins, at the [facility]. Please see a copy of my visit note below.    Referring provider: Jocelin Guajardo MD    HPI: Ms. Brenna Watkins is a 86 year old  female with PMH significant for   -Hypertension (new diagnosis) currently not on treatment    Patient is being seen today at request of Dr. Guajardo for dyspnea on exertion which has been gradually worsening over the last few years.  Particularly over the last 1 year she is having difficulty to walk with her friends even level ground.  She feels short of breath climbing stairs. Denies chest pain, palpitations, dizziness, syncope or lower extremity edema.  She tells me that she sometimes wakes up at night with shortness of breath.  Patient was recommended lisinopril 5 mg for hypertension when she was last seen on 3/1/2021 by Dr. Guajardo.  Today she tells me that she has not received any prescription.      She had an extensive work-up for dyspnea on exertion in 2017 and 2018 with spirometry, chest CT PE and stress echocardiogram which were all were unremarkable.  Most recent lab tests including CBC, BMP and TSH were all normal.    She has no history of diabetes mellitus, hyperlipidemia, coronary artery disease or any cardiac arrhythmia.  Patient tells me that 4 of her brothers  of MI younger than 65 years old.    No prior history of cardiac disease.  Lifetime non-smoker.    Patient is currently on omeprazole but no other medications.    Stress echocardiogram in 2017 showed a structurally normal heart with no inducible ischemia.  EKG 2018 shows sinus rhythm otherwise normal significant change.    Medications, personal, family, and social history reviewed with patient and  revised.    Interval history 12/7/2021:  Patient is being seen today for follow-up.  As you know when I first saw her in April of this year she was complaining of shortness of breath with exertion. A coronary CT was pursued.  This showed diffuse CAD with possible obstructive CAD.  As such, coronary angiogram was completed 6/1/2021. This showed obstructive CAD of the RCA and LCx.  She underwent TIFFANY x1 to both areas. She was discharged the same day.   After the PCI she was seen in June of this year for follow-up.  At that time she continued to report shortness of breath.    Today patient tells me that she is having a lot of back pain and neck pain.  She has been waiting to get a spinal injection for back pain but since she had PCI we have postponed the procedure.  Continues to report dyspnea on exertion.  No change in the symptoms since PCI.  No chest pain, dizziness, syncope or lower extremity edema.    Interval history 7/13/2022:  Patient is being seen today for 6-month follow-up.  She tells me that she still has shortness of breath with exertion which occurs when she is walking or exercising.  She has to stop frequently to catch her breath.  Symptoms has not changed since he had PCI.  Patient is able to walk for half an hour, exercise with the machines and do house chores.  Denies chest pain or any other concerning cardiac symptoms at this time.  She tells me that her  has Alzheimer's and she is taking care of her .  She has recently seen pulmonary and underwent pulmonary function test.  PFT and chest imaging were normal.  Patient's shortness of breath with activity was not attributed to any lung pathology.  She had recent echocardiogram which shows normal biventricular function with no significant valve disease.  Patient tells me that she has history of bilateral carpal tunnel syndrome.    Interval history 5/17/2023:  Patient returns for follow-up.  She tells me she is suffering from sciatica pain  which limits her physical activity.  She walks with her  and tells me that she feels tired and short of breath when she is walking.  She tells me she has been sleeping on the recliner for several years.  Denies lower extremity edema, no chest pain.  Reviewed patient's recent echocardiogram 5/15/2023 which showed normal biventricular function, no significant valve disease and grade 2 moderate diastolic dysfunction.  IVC is normal.  She is currently on aspirin 81, Lipitor 40 mg and metoprolol 12.5 mg daily    PAST MEDICAL HISTORY:  Past Medical History:   Diagnosis Date     Brain aneurysm      Bunion      CAD (coronary artery disease)      CTS (carpal tunnel syndrome)      DDD (degenerative disc disease), cervical      GERD without esophagitis      Hyperlipidemia LDL goal <70      Hypertension goal BP (blood pressure) < 140/90 07/05/2017     Osteopenia        CURRENT MEDICATIONS:  Current Outpatient Medications   Medication Sig Dispense Refill     aspirin (ASA) 81 MG chewable tablet Take 1 tablet (81 mg) by mouth daily 90 tablet 3     atorvastatin (LIPITOR) 40 MG tablet Take 1 tablet (40 mg) by mouth daily 90 tablet 3     diphenhydrAMINE-acetaminophen (TYLENOL PM)  MG tablet Take 2 tablets by mouth nightly as needed for sleep       metoprolol succinate ER (TOPROL XL) 25 MG 24 hr tablet TAKE 1/2 TABLET(12.5 MG) BY MOUTH DAILY 45 tablet 0     multivitamin w/minerals (THERA-VIT-M) tablet Take 1 tablet by mouth daily       Omeprazole (PRILOSEC PO) Take 20 mg by mouth daily         PAST SURGICAL HISTORY:  Past Surgical History:   Procedure Laterality Date     APPENDECTOMY       AS REVISE ULNAR NERVE AT ELBOW  2018     BACK SURGERY  2015     CARPAL TUNNEL RELEASE RT/LT Right      CRANIOTOMY, REPAIR ANEURYSM, COMBINED       CV CORONARY ANGIOGRAM N/A 6/1/2021    Procedure: CV CORONARY ANGIOGRAM;  Surgeon: Syed Zayas MD;  Location:  HEART CARDIAC CATH LAB     CV PCI STENT DRUG ELUTING N/A  6/1/2021    Procedure: Percutaneous Coronary Intervention Stent Drug Eluting;  Surgeon: Syed Zayas MD;  Location:  HEART CARDIAC CATH LAB     IR CAROTID ANGIOGRAM  1/26/2012     IR CAROTID ANGIOGRAM  1/26/2012     IR MISCELLANEOUS PROCEDURE  1/26/2012     STRIP VEIN       TOE SURGERY  03/2018       ALLERGIES:     Allergies   Allergen Reactions     Pcn [Penicillins]        FAMILY HISTORY:  Family History   Problem Relation Age of Onset     Sudden Death Mother         d. childbirth      Alcoholism Father 62     Myocardial Infarction Brother 68     Alzheimer Disease Sister      Diabetes No family hx of      Cancer No family hx of          SOCIAL HISTORY:  Social History     Tobacco Use     Smoking status: Never     Smokeless tobacco: Never   Substance Use Topics     Alcohol use: Yes     Alcohol/week: 1.0 - 2.0 standard drink of alcohol     Types: 1 - 2 Glasses of wine per week     Drug use: No       ROS:   Constitutional: No fever, chills, or sweats. Weight stable.   Cardiovascular: As per HPI.       Exam:  /82 (BP Location: Left arm, Patient Position: Sitting, Cuff Size: Adult Regular)   Pulse 85   Wt 71 kg (156 lb 9.6 oz)   SpO2 100%   BMI 29.59 kg/m    GENERAL APPEARANCE: alert and no distress  HEENT: no icterus, no central cyanosis  LYMPH/NECK: no adenopathy, no asymmetry, JVP not elevated.  RESPIRATORY: lungs clear to auscultation - no rales, rhonchi or wheezes, no use of accessory muscles, no retractions, respirations are unlabored, normal respiratory rate  CARDIOVASCULAR: regular rhythm, normal S1, S2, no S3 or S4 and no murmur, click or rub, precordium quiet with normal PMI.  EXTREMITIES: no edema, varicose veins noted+  NEURO: alert, normal speech,and affect  SKIN: no ecchymoses, no rashes     I have reviewed the labs and personally reviewed the imaging below and made my comment in the assessment and plan.    Labs:  CBC RESULTS:   Lab Results   Component Value Date    WBC 6.0  07/13/2022    WBC 7.8 06/01/2021    RBC 4.62 07/13/2022    RBC 4.62 06/01/2021    HGB 13.9 07/13/2022    HGB 14.0 06/01/2021    HCT 44.0 07/13/2022    HCT 44.0 06/01/2021    MCV 95 07/13/2022    MCV 95 06/01/2021    MCH 30.1 07/13/2022    MCH 30.3 06/01/2021    MCHC 31.6 07/13/2022    MCHC 31.8 06/01/2021    RDW 14.9 07/13/2022    RDW 14.2 06/01/2021     07/13/2022     06/01/2021       BMP RESULTS:  Lab Results   Component Value Date     05/03/2022     06/01/2021    POTASSIUM 4.4 05/03/2022    POTASSIUM 4.2 06/01/2021    CHLORIDE 108 05/03/2022    CHLORIDE 106 06/01/2021    CO2 25 05/03/2022    CO2 29 06/01/2021    ANIONGAP 9 05/03/2022    ANIONGAP 3 06/01/2021    GLC 94 05/03/2022    GLC 88 06/01/2021    BUN 15 05/03/2022    BUN 21 06/01/2021    CR 0.91 05/03/2022    CR 0.92 06/01/2021    GFRESTIMATED 62 05/03/2022    GFRESTIMATED 57 (L) 06/01/2021    GFRESTBLACK 66 06/01/2021    BAUTISTA 9.5 05/03/2022    BAUTISTA 9.2 06/01/2021     Transthoracic echocardiogram 5/15/2023  Global and regional left ventricular function is normal with an EF of 60-65%.  Mild concentric wall thickening consistent with left ventricular hypertrophy  is present.  Grade II or moderate diastolic dysfunction.  Global right ventricular function is normal.  No significant valvular abnormalities present.  Mild pulmonary hypertension is present.  IVC diameter <2.1 cm collapsing >50% with sniff suggests a normal RA pressure  of 3 mmHg.  No pericardial effusion is present.  No significant changes noted.    Echocardiogram 6/30/2022  The left ventricle is normal in size with borderline concentric left  ventricular hypertrophy.  Left ventricular function is normal.The ejection fraction is 60-65%.  Normal right ventricle size and systolic function.  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  Compared to the prior study dated 5/10/21, there have been no changes.    Coronary  angiogram 6/1/2021  2 vessel severe CAD involving the RCA and LCx. Otherwise mild non obstructive CAD with severely tortuous coronaries.  PCI with two drug eluting stents (LCx and RCA).    CT coronary angiogram 5/12/2021  1.  Diffuse coronary atherosclerosis, possibly with 3 vessel  obstructive CAD. Recommend invasive evaluation.  2.  Total Agatston score 249 placing the patient in the 59 percentile  when compared to age and gender matched control group.    Echocardiogram 5/10/2021  Global and regional left ventricular function is normal with an EF of 60-65%.  Right ventricular function, chamber size, wall motion, and thickness are  normal.  Both atria appear normal.  Pulmonary artery systolic pressure is normal.  The inferior vena cava is normal.  No pericardial effusion is present.    Spirometry 2017: Normal  Echocardiogram 2017  Normal exercise echocardiogram without evidence of inducible ischemia.  Sensitivity of the test is reduced since target heart rate was not achieved.  With stress, the left ventricular ejection fraction increased from 55-60% to  greater than 65% and the left ventricular size decreased appropriately.  There was no ECG evidence of ischemia. Heart rate response to exercise was  normal, with hypertensive BP response.  No subjective symptoms to suggest ischemia.  No significant valve disease on screening doppler evaluation. The aortic root  and visualized ascending aorta are normal.    CT PE 2018: Lungs clear.  No evidence of pulmonary embolism.    EKG 2018 sinus rhythm otherwise unremarkable        Assessment and Plan:     #CAD S/P PCI with two drug eluting stents (LCx and RCA) on 6/1/2021  -Continues to report BEARDEN with physical activity which is unchanged since PCI.  She has seen pulmonary.  No pulmonary etiology.  PFT is normal.  Chest imaging is unremarkable.  Recent echocardiogram is also unremarkable except diastolic dysfunction is reported as grade 2.  I do not think she has diastolic  heart failure.  Diastolic dysfunction is likely overestimated due to mitral annular calcification.  She is euvolemic on exam.  She is also suffering from sciatica which limits her physical activities.  I think likely she is having deconditioning rather than cardiac or pulmonary etiology for her shortness of breath with activity.  I do not recommend further work-up at this time.      Return to clinic as needed.      Total time spent today for this visit is 40 minutes including precharting, face-to-face clinic visit, review of labs/imaging and medical documentation.    Please donot hesitate to contact me if you have any questions or concerns. Again, thank you for allowing me to participate in the care of your patient.    Gil MARINO MD  [facility] Division of Cardiology  Pager [phone]

## 2023-05-24 ENCOUNTER — OFFICE VISIT (OUTPATIENT)
Dept: FAMILY MEDICINE | Facility: CLINIC | Age: 88
End: 2023-05-24
Payer: COMMERCIAL

## 2023-05-24 VITALS
SYSTOLIC BLOOD PRESSURE: 149 MMHG | HEART RATE: 84 BPM | DIASTOLIC BLOOD PRESSURE: 88 MMHG | RESPIRATION RATE: 18 BRPM | HEIGHT: 61 IN | BODY MASS INDEX: 29.64 KG/M2 | WEIGHT: 157 LBS | OXYGEN SATURATION: 97 % | TEMPERATURE: 98 F

## 2023-05-24 DIAGNOSIS — M50.30 DDD (DEGENERATIVE DISC DISEASE), CERVICAL: Primary | ICD-10-CM

## 2023-05-24 DIAGNOSIS — I10 HYPERTENSION GOAL BP (BLOOD PRESSURE) < 140/90: ICD-10-CM

## 2023-05-24 DIAGNOSIS — I25.118 CORONARY ARTERY DISEASE OF NATIVE ARTERY OF NATIVE HEART WITH STABLE ANGINA PECTORIS (H): ICD-10-CM

## 2023-05-24 DIAGNOSIS — R68.89 COLD INTOLERANCE: ICD-10-CM

## 2023-05-24 DIAGNOSIS — Z23 NEED FOR DIPHTHERIA-TETANUS-PERTUSSIS (TDAP) VACCINE: ICD-10-CM

## 2023-05-24 DIAGNOSIS — E78.5 HYPERLIPIDEMIA LDL GOAL <70: ICD-10-CM

## 2023-05-24 DIAGNOSIS — M85.80 OSTEOPENIA, UNSPECIFIED LOCATION: ICD-10-CM

## 2023-05-24 DIAGNOSIS — Z78.0 ASYMPTOMATIC POSTMENOPAUSAL STATUS: ICD-10-CM

## 2023-05-24 LAB
ANION GAP SERPL CALCULATED.3IONS-SCNC: 12 MMOL/L (ref 7–15)
BASOPHILS # BLD AUTO: 0 10E3/UL (ref 0–0.2)
BASOPHILS NFR BLD AUTO: 1 %
BUN SERPL-MCNC: 18.8 MG/DL (ref 8–23)
CALCIUM SERPL-MCNC: 9.8 MG/DL (ref 8.8–10.2)
CHLORIDE SERPL-SCNC: 106 MMOL/L (ref 98–107)
CREAT SERPL-MCNC: 0.9 MG/DL (ref 0.51–0.95)
DEPRECATED HCO3 PLAS-SCNC: 24 MMOL/L (ref 22–29)
EOSINOPHIL # BLD AUTO: 0.1 10E3/UL (ref 0–0.7)
EOSINOPHIL NFR BLD AUTO: 2 %
ERYTHROCYTE [DISTWIDTH] IN BLOOD BY AUTOMATED COUNT: 13.9 % (ref 10–15)
GFR SERPL CREATININE-BSD FRML MDRD: 62 ML/MIN/1.73M2
GLUCOSE SERPL-MCNC: 98 MG/DL (ref 70–99)
HCT VFR BLD AUTO: 38.9 % (ref 35–47)
HGB BLD-MCNC: 12.8 G/DL (ref 11.7–15.7)
LYMPHOCYTES # BLD AUTO: 1.3 10E3/UL (ref 0.8–5.3)
LYMPHOCYTES NFR BLD AUTO: 30 %
MCH RBC QN AUTO: 31 PG (ref 26.5–33)
MCHC RBC AUTO-ENTMCNC: 32.9 G/DL (ref 31.5–36.5)
MCV RBC AUTO: 94 FL (ref 78–100)
MONOCYTES # BLD AUTO: 0.5 10E3/UL (ref 0–1.3)
MONOCYTES NFR BLD AUTO: 11 %
NEUTROPHILS # BLD AUTO: 2.4 10E3/UL (ref 1.6–8.3)
NEUTROPHILS NFR BLD AUTO: 57 %
PLATELET # BLD AUTO: 238 10E3/UL (ref 150–450)
POTASSIUM SERPL-SCNC: 4.1 MMOL/L (ref 3.4–5.3)
RBC # BLD AUTO: 4.13 10E6/UL (ref 3.8–5.2)
SODIUM SERPL-SCNC: 142 MMOL/L (ref 136–145)
TSH SERPL DL<=0.005 MIU/L-ACNC: 3.58 UIU/ML (ref 0.3–4.2)
WBC # BLD AUTO: 4.2 10E3/UL (ref 4–11)

## 2023-05-24 PROCEDURE — 84443 ASSAY THYROID STIM HORMONE: CPT | Performed by: FAMILY MEDICINE

## 2023-05-24 PROCEDURE — 85025 COMPLETE CBC W/AUTO DIFF WBC: CPT | Performed by: FAMILY MEDICINE

## 2023-05-24 PROCEDURE — G0009 ADMIN PNEUMOCOCCAL VACCINE: HCPCS | Performed by: FAMILY MEDICINE

## 2023-05-24 PROCEDURE — 90677 PCV20 VACCINE IM: CPT | Performed by: FAMILY MEDICINE

## 2023-05-24 PROCEDURE — 80048 BASIC METABOLIC PNL TOTAL CA: CPT | Performed by: FAMILY MEDICINE

## 2023-05-24 PROCEDURE — 99214 OFFICE O/P EST MOD 30 MIN: CPT | Mod: 25 | Performed by: FAMILY MEDICINE

## 2023-05-24 PROCEDURE — 36415 COLL VENOUS BLD VENIPUNCTURE: CPT | Performed by: FAMILY MEDICINE

## 2023-05-24 RX ORDER — ASPIRIN 81 MG/1
81 TABLET, CHEWABLE ORAL DAILY
Qty: 90 TABLET | Refills: 3 | Status: SHIPPED | OUTPATIENT
Start: 2023-05-24 | End: 2024-06-25

## 2023-05-24 RX ORDER — ATORVASTATIN CALCIUM 40 MG/1
40 TABLET, FILM COATED ORAL DAILY
Qty: 90 TABLET | Refills: 0 | Status: SHIPPED | OUTPATIENT
Start: 2023-05-24 | End: 2024-01-22

## 2023-05-24 RX ORDER — METOPROLOL SUCCINATE 25 MG/1
12.5 TABLET, EXTENDED RELEASE ORAL DAILY
Qty: 45 TABLET | Refills: 3 | Status: SHIPPED | OUTPATIENT
Start: 2023-05-24 | End: 2024-06-25

## 2023-05-24 ASSESSMENT — ANXIETY QUESTIONNAIRES
1. FEELING NERVOUS, ANXIOUS, OR ON EDGE: NOT AT ALL
7. FEELING AFRAID AS IF SOMETHING AWFUL MIGHT HAPPEN: NOT AT ALL
5. BEING SO RESTLESS THAT IT IS HARD TO SIT STILL: NOT AT ALL
GAD7 TOTAL SCORE: 0
3. WORRYING TOO MUCH ABOUT DIFFERENT THINGS: NOT AT ALL
6. BECOMING EASILY ANNOYED OR IRRITABLE: NOT AT ALL
GAD7 TOTAL SCORE: 0
4. TROUBLE RELAXING: NOT AT ALL
2. NOT BEING ABLE TO STOP OR CONTROL WORRYING: NOT AT ALL

## 2023-05-24 ASSESSMENT — PATIENT HEALTH QUESTIONNAIRE - PHQ9: SUM OF ALL RESPONSES TO PHQ QUESTIONS 1-9: 2

## 2023-05-24 NOTE — PROGRESS NOTES
"  Assessment & Plan     (M50.30) DDD (degenerative disc disease), cervical  (primary encounter diagnosis)  Plan: offered physical therapy and/or prescription pain medication. Discussed healthy coping, adjustment of activities, heat/ice, etc.     (I25.118) Coronary artery disease of native artery of native heart with stable angina pectoris (H)  (E78.5) Hyperlipidemia LDL goal <70  Comment: chronic dyspnea - cardiology and pulmonology consultations and prior CT chest reviewed   Plan: TSH with free T4 reflex        The patient is reassured that these symptoms do not appear to represent an immediately threatening condition.      (R68.89) Cold intolerance  Plan: CBC with platelets and differential, TSH with         free T4 reflex        The patient is reassured that these symptoms do not appear to represent a serious or threatening condition.     (I10) Hypertension goal BP (blood pressure) < 140/90  Comment: reasonable control based on age   Plan: BASIC METABOLIC PANEL, metoprolol succinate ER         (TOPROL XL) 25 MG 24 hr tablet          (M85.80) Osteopenia, unspecified location  Plan: DX Hip/Pelvis/Spine, TSH with free T4 reflex        Offered treatment with alendronate     BMI:   Estimated body mass index is 29.26 kg/m  as calculated from the following:    Height as of this encounter: 1.56 m (5' 1.42\").    Weight as of this encounter: 71.2 kg (157 lb).       See Patient Instructions    Jocelin Guajardo MD  [facility] JUAN Ceja is a 88 year old, presenting for the following health issues:  Neck Pain        5/24/2023     7:25 AM   Additional Questions   Roomed by Shanda DUBON     Pain History:  When did you first notice your pain? About a year   Have you seen this provider for your pain in the past? No   Where in your body do your have pain? Neck, Shoulders  Are you seeing anyone else for your pain? No  What makes your pain better? Tylenol  What makes your pain worse? Walking, " "Working  How has pain affected your ability to work? Not currently working - unrelated to pain  Who lives in your household?         5/24/2023     8:08 AM   PHQ-9 SCORE   PHQ-9 Total Score 2         5/24/2023     8:09 AM   JESUS-7 SCORE   Total Score 0         5/24/2023     7:33 AM   PEG Score   PEG Total Score 5       Chronic Pain - Initial Assessment:    How would you describe your pain? Aching bilateral shoulders and neck   Have you had any recent changes to the severity or character of your pain? No   Is there an underlying cause that has been identified? No   Has your ability to work or do daily activities changed recently because of your pain? No   Which of these pain treatments have you tried? Cold, Heat and Physical Therapy  Previous medication treatments:  Other - acetaminophen (Tylenol)                   Review of Systems   CONSTITUTIONAL:POSITIVE  for weight gain  ENT/MOUTH: NEGATIVE for ear, mouth and throat problems  RESP:dyspnea on exertion  CV: NEGATIVE for chest pain, palpitations or peripheral edema  MUSCULOSKELETAL: NEGATIVE for significant arthralgias or myalgia  NEURO: dysesthesias  PSYCHIATRIC: NEGATIVE for changes in mood or affect      Objective    BP (!) 149/88 (BP Location: Left arm, Patient Position: Sitting, Cuff Size: Adult Regular)   Pulse 84   Temp 98  F (36.7  C) (Oral)   Resp 18   Ht 1.56 m (5' 1.42\")   Wt 71.2 kg (157 lb)   SpO2 97%   BMI 29.26 kg/m    Body mass index is 29.26 kg/m .  Physical Exam   GENERAL: healthy, alert and no distress  EYES: Eyes grossly normal to inspection, PERRL and conjunctivae and sclerae normal  NECK: no adenopathy, no asymmetry, masses, or scars and thyroid normal to palpation  RESP: lungs clear to auscultation - no rales, rhonchi or wheezes  CV: regular rate and rhythm, normal S1 S2, no S3 or S4, no murmur, click or rub, trace woody bipedal edema   MS: bony arthropathy of toes, normal gait    SKIN: scattered spider veins of lower extremities "   PSYCH: mentation appears normal, affect normal/bright    Ancillary Procedure on 05/15/2023   Component Date Value Ref Range Status     LVEF  05/15/2023 60-65%   Final     Results for orders placed or performed in visit on 05/24/23 (from the past 24 hour(s))   CBC with platelets and differential    Narrative    The following orders were created for panel order CBC with platelets and differential.  Procedure                               Abnormality         Status                     ---------                               -----------         ------                     CBC with platelets and d...[[id number]]                      In process                   Please view results for these tests on the individual orders.

## 2023-05-24 NOTE — LETTER
May 25, 2023    Brenna Watkins  [address] [address]          Dear ,    We are writing to inform you of your test results.  Electrolytes, kidney function, thyroid hormone and blood counts all look great.       Resulted Orders   BASIC METABOLIC PANEL   Result Value Ref Range    Sodium 142 136 - 145 mmol/L    Potassium 4.1 3.4 - 5.3 mmol/L    Chloride 106 98 - 107 mmol/L    Carbon Dioxide (CO2) 24 22 - 29 mmol/L    Anion Gap 12 7 - 15 mmol/L    Urea Nitrogen 18.8 8.0 - 23.0 mg/dL    Creatinine 0.90 0.51 - 0.95 mg/dL    Calcium 9.8 8.8 - 10.2 mg/dL    Glucose 98 70 - 99 mg/dL    GFR Estimate 62 >60 mL/min/1.73m2      Comment:      eGFR calculated using 2021 CKD-EPI equation.   TSH with free T4 reflex   Result Value Ref Range    TSH 3.58 0.30 - 4.20 uIU/mL   CBC with platelets and differential   Result Value Ref Range    WBC Count 4.2 4.0 - 11.0 10e3/uL    RBC Count 4.13 3.80 - 5.20 10e6/uL    Hemoglobin 12.8 11.7 - 15.7 g/dL    Hematocrit 38.9 35.0 - 47.0 %    MCV 94 78 - 100 fL    MCH 31.0 26.5 - 33.0 pg    MCHC 32.9 31.5 - 36.5 g/dL    RDW 13.9 10.0 - 15.0 %    Platelet Count 238 150 - 450 10e3/uL    % Neutrophils 57 %    % Lymphocytes 30 %    % Monocytes 11 %    % Eosinophils 2 %    % Basophils 1 %    Absolute Neutrophils 2.4 1.6 - 8.3 10e3/uL    Absolute Lymphocytes 1.3 0.8 - 5.3 10e3/uL    Absolute Monocytes 0.5 0.0 - 1.3 10e3/uL    Absolute Eosinophils 0.1 0.0 - 0.7 10e3/uL    Absolute Basophils 0.0 0.0 - 0.2 10e3/uL       If you have any questions or concerns, please call the clinic at the number listed above.       Sincerely,      Jocelin Guajardo MD

## 2023-05-25 NOTE — RESULT ENCOUNTER NOTE
Brenna Watkins    Electrolytes, kidney function, thyroid hormone and blood counts all look great.     Sincerely,       VANESSA BANDA M.D.   (For Dr Guajardo)

## 2023-06-14 ENCOUNTER — TELEPHONE (OUTPATIENT)
Dept: CARDIOLOGY | Facility: CLINIC | Age: 88
End: 2023-06-14
Payer: COMMERCIAL

## 2023-06-14 NOTE — TELEPHONE ENCOUNTER
[facility] Call Center    Phone Message    May a detailed message be left on voicemail: no     Reason for Call: Other: Brenna is returning a missed phone call from Shelley Garcia RN. Please reach back out to her at [phone].        Action Taken: Other: [facility]    Travel Screening: Not Applicable

## 2023-06-15 NOTE — TELEPHONE ENCOUNTER
Spoke to pt. Stated returning call from a month ago to Shelley.     Pt has been seen in clinic since then. No further questions

## 2023-06-21 ENCOUNTER — TRANSFERRED RECORDS (OUTPATIENT)
Dept: HEALTH INFORMATION MANAGEMENT | Facility: CLINIC | Age: 88
End: 2023-06-21
Payer: COMMERCIAL

## 2023-06-30 ENCOUNTER — TRANSFERRED RECORDS (OUTPATIENT)
Dept: HEALTH INFORMATION MANAGEMENT | Facility: CLINIC | Age: 88
End: 2023-06-30
Payer: COMMERCIAL

## 2023-07-11 ENCOUNTER — TELEPHONE (OUTPATIENT)
Dept: FAMILY MEDICINE | Facility: CLINIC | Age: 88
End: 2023-07-11

## 2023-07-11 NOTE — LETTER
July 11, 2023    Brenna M Roland  [address] [address]      Your team at [facility] cares about your health. We have reviewed your chart and based on our findings; we are making the following recommendations to better manage your health.     You are in particular need of attention regarding the following:     HYPERTENSION FOLLOW UP: Blood pressure check with nurse  PREVENTATIVE VISIT: Annual Medicare Wellness:Schedule an Annual Medicare Wellness Exam. Please call your [facility] clinic to set up your appointment.    If you have already completed these items, please contact the clinic via phone or   MyChart so your care team can review and update your records. Thank you for   choosing [facility] for your healthcare needs. For any questions,   concerns, or to schedule an appointment please contact our clinic.    Healthy Regards,      Your [facility] Care Team

## 2023-07-11 NOTE — TELEPHONE ENCOUNTER
Patient Quality Outreach    Patient is due for the following:   Hypertension -  BP check  Physical Annual Wellness Visit    Next Steps:   Schedule a Annual Wellness Visit/Blood pressure    Type of outreach:    Sent letter.      Questions for provider review:    None           Purnima Moreno CMA  Chart routed to Care Team.

## 2023-08-22 ENCOUNTER — TELEPHONE (OUTPATIENT)
Dept: FAMILY MEDICINE | Facility: CLINIC | Age: 88
End: 2023-08-22
Payer: COMMERCIAL

## 2023-08-22 NOTE — TELEPHONE ENCOUNTER
Patient Quality Outreach    Patient is due for the following:   Hypertension -  BP check  Physical Annual Wellness Visit    Next Steps:   Schedule a Annual Wellness Visit    Type of outreach:    Phone, spoke to patient/parent. Spoke to Brenna and she declined to schedule wellness visit. Scheduled ancillary visit for blood pressure and lab visit.    Next Steps:  Reach out within 90 days via Letter.    Max number of attempts reached: Yes. Will try again in 90 days if patient still on fail list.    Questions for provider review:    None           Purnima Moreno [facility]  Chart routed to Care Team.

## 2023-08-28 ENCOUNTER — LAB (OUTPATIENT)
Dept: LAB | Facility: CLINIC | Age: 88
End: 2023-08-28
Payer: COMMERCIAL

## 2023-08-28 ENCOUNTER — ALLIED HEALTH/NURSE VISIT (OUTPATIENT)
Dept: FAMILY MEDICINE | Facility: CLINIC | Age: 88
End: 2023-08-28
Payer: COMMERCIAL

## 2023-08-28 VITALS — DIASTOLIC BLOOD PRESSURE: 86 MMHG | SYSTOLIC BLOOD PRESSURE: 138 MMHG | HEART RATE: 75 BPM

## 2023-08-28 DIAGNOSIS — I10 HYPERTENSION GOAL BP (BLOOD PRESSURE) < 140/90: Primary | ICD-10-CM

## 2023-08-28 DIAGNOSIS — I25.10 CAD (CORONARY ARTERY DISEASE): Primary | ICD-10-CM

## 2023-08-28 LAB
CHOLEST SERPL-MCNC: 193 MG/DL
HDLC SERPL-MCNC: 53 MG/DL
LDLC SERPL CALC-MCNC: 112 MG/DL
NONHDLC SERPL-MCNC: 140 MG/DL
TRIGL SERPL-MCNC: 141 MG/DL

## 2023-08-28 PROCEDURE — 36415 COLL VENOUS BLD VENIPUNCTURE: CPT

## 2023-08-28 PROCEDURE — 99207 PR NO CHARGE NURSE ONLY: CPT

## 2023-08-28 PROCEDURE — 80061 LIPID PANEL: CPT

## 2023-08-28 NOTE — LETTER
August 28, 2023    Brenna M Roland  [address] [place] [address]          Dear ,    We are writing to inform you of your test results.  Your cholesterol is a bit higher this year. If you are taking your medication regularly, we could discuss a dose increase of generic Lipitor. Follow-up with me for a yearly preventive Wellness visit as planned.       Resulted Orders   Lipid panel reflex to direct LDL Non-fasting   Result Value Ref Range    Cholesterol 193 <200 mg/dL    Triglycerides 141 <150 mg/dL    Direct Measure HDL 53 >=50 mg/dL    LDL Cholesterol Calculated 112 (H) <=100 mg/dL    Non HDL Cholesterol 140 (H) <130 mg/dL    Narrative    Cholesterol  Desirable:  <200 mg/dL    Triglycerides  Normal:  Less than 150 mg/dL  Borderline High:  150-199 mg/dL  High:  200-499 mg/dL  Very High:  Greater than or equal to 500 mg/dL    Direct Measure HDL  Female:  Greater than or equal to 50 mg/dL   Male:  Greater than or equal to 40 mg/dL    LDL Cholesterol  Desirable:  <100mg/dL  Above Desirable:  100-129 mg/dL   Borderline High:  130-159 mg/dL   High:  160-189 mg/dL   Very High:  >= 190 mg/dL    Non HDL Cholesterol  Desirable:  130 mg/dL  Above Desirable:  130-159 mg/dL  Borderline High:  160-189 mg/dL  High:  190-219 mg/dL  Very High:  Greater than or equal to 220 mg/dL       If you have any questions or concerns, please call the clinic at the number listed above.       Sincerely,      Jocelin Guajardo MD

## 2023-08-28 NOTE — PROGRESS NOTES
Brenna Watkins is a 88 year old patient who comes in today for a Blood Pressure check.  Initial BP:  /86 (BP Location: Left arm, Patient Position: Sitting, Cuff Size: Adult Regular)   Pulse 75      75  Disposition: results routed to provider

## 2023-08-28 NOTE — RESULT ENCOUNTER NOTE
Brenna,    Your cholesterol is a bit higher this year. If you are taking your medication regularly, we could discuss a dose increase of generic Lipitor. Follow-up with me for a yearly preventive Wellness visit as planned.     Jocelin Guajardo MD

## 2024-01-10 ENCOUNTER — TRANSFERRED RECORDS (OUTPATIENT)
Dept: HEALTH INFORMATION MANAGEMENT | Facility: CLINIC | Age: 89
End: 2024-01-10

## 2024-01-19 DIAGNOSIS — I25.118 CORONARY ARTERY DISEASE OF NATIVE ARTERY OF NATIVE HEART WITH STABLE ANGINA PECTORIS (H): ICD-10-CM

## 2024-01-22 RX ORDER — ATORVASTATIN CALCIUM 40 MG/1
40 TABLET, FILM COATED ORAL DAILY
Qty: 90 TABLET | Refills: 3 | Status: SHIPPED | OUTPATIENT
Start: 2024-01-22

## 2024-01-30 ENCOUNTER — TRANSFERRED RECORDS (OUTPATIENT)
Dept: HEALTH INFORMATION MANAGEMENT | Facility: CLINIC | Age: 89
End: 2024-01-30
Payer: COMMERCIAL

## 2024-02-21 ENCOUNTER — TELEPHONE (OUTPATIENT)
Dept: FAMILY MEDICINE | Facility: CLINIC | Age: 89
End: 2024-02-21
Payer: COMMERCIAL

## 2024-02-21 NOTE — TELEPHONE ENCOUNTER
Patient Quality Outreach    Patient is due for the following:   Physical Annual Wellness Visit      Topic Date Due    Diptheria Tetanus Pertussis (DTAP/TDAP/TD) Vaccine (1 - Tdap) Never done    Flu Vaccine (1) 09/01/2023    COVID-19 Vaccine (4 - 2023-24 season) 09/01/2023       Next Steps:   Schedule a Annual Wellness Visit    Type of outreach:    Sent letter.    Next Steps:  Reach out within 90 days via Letter.    Max number of attempts reached: Yes. Will try again in 90 days if patient still on fail list.    Questions for provider review:    None           Purnima Moreno CMA  Chart routed to Care Team.

## 2024-02-21 NOTE — LETTER
February 21, 2024      Brenna M Roland  [address] [facility] [facility] [address]      Your team at [facility] cares about your health. We have reviewed your chart and based on our findings; we are making the following recommendations to better manage your health.     You are in particular need of attention regarding the following:     PREVENTATIVE VISIT: Annual Medicare Wellness:Schedule an Annual Medicare Wellness Exam. Please call your [facility] clinic to set up your appointment.  OTHER FOLLOW UP: Immunizations    If you have already completed these items, please contact the clinic via phone or   MyChart so your care team can review and update your records. Thank you for   choosing [facility] for your healthcare needs. For any questions,   concerns, or to schedule an appointment please contact our clinic.    Healthy Regards,      Your [facility] Care Team

## 2024-06-25 ENCOUNTER — OFFICE VISIT (OUTPATIENT)
Dept: FAMILY MEDICINE | Facility: CLINIC | Age: 89
End: 2024-06-25
Payer: COMMERCIAL

## 2024-06-25 VITALS
DIASTOLIC BLOOD PRESSURE: 83 MMHG | SYSTOLIC BLOOD PRESSURE: 134 MMHG | TEMPERATURE: 98.1 F | WEIGHT: 156 LBS | OXYGEN SATURATION: 94 % | RESPIRATION RATE: 22 BRPM | HEART RATE: 81 BPM | BODY MASS INDEX: 28.71 KG/M2 | HEIGHT: 62 IN

## 2024-06-25 DIAGNOSIS — Z29.11 NEED FOR VACCINATION AGAINST RESPIRATORY SYNCYTIAL VIRUS: ICD-10-CM

## 2024-06-25 DIAGNOSIS — I10 HYPERTENSION GOAL BP (BLOOD PRESSURE) < 140/90: ICD-10-CM

## 2024-06-25 DIAGNOSIS — Z23 NEED FOR TDAP VACCINATION: ICD-10-CM

## 2024-06-25 DIAGNOSIS — I25.118 CORONARY ARTERY DISEASE OF NATIVE ARTERY OF NATIVE HEART WITH STABLE ANGINA PECTORIS (H): ICD-10-CM

## 2024-06-25 DIAGNOSIS — M85.80 LOW BONE MASS: Primary | ICD-10-CM

## 2024-06-25 DIAGNOSIS — M51.369 DDD (DEGENERATIVE DISC DISEASE), LUMBAR: ICD-10-CM

## 2024-06-25 DIAGNOSIS — Z78.0 ASYMPTOMATIC POSTMENOPAUSAL STATUS: ICD-10-CM

## 2024-06-25 LAB
ANION GAP SERPL CALCULATED.3IONS-SCNC: 7 MMOL/L (ref 7–15)
BUN SERPL-MCNC: 14.3 MG/DL (ref 8–23)
CALCIUM SERPL-MCNC: 10 MG/DL (ref 8.8–10.2)
CHLORIDE SERPL-SCNC: 104 MMOL/L (ref 98–107)
CHOLEST SERPL-MCNC: 204 MG/DL
CREAT SERPL-MCNC: 0.89 MG/DL (ref 0.51–0.95)
DEPRECATED HCO3 PLAS-SCNC: 29 MMOL/L (ref 22–29)
EGFRCR SERPLBLD CKD-EPI 2021: 62 ML/MIN/1.73M2
FASTING STATUS PATIENT QL REPORTED: YES
FASTING STATUS PATIENT QL REPORTED: YES
GLUCOSE SERPL-MCNC: 101 MG/DL (ref 70–99)
HDLC SERPL-MCNC: 60 MG/DL
LDLC SERPL CALC-MCNC: 103 MG/DL
NONHDLC SERPL-MCNC: 144 MG/DL
POTASSIUM SERPL-SCNC: 4.7 MMOL/L (ref 3.4–5.3)
SODIUM SERPL-SCNC: 140 MMOL/L (ref 135–145)
TRIGL SERPL-MCNC: 205 MG/DL

## 2024-06-25 PROCEDURE — G2211 COMPLEX E/M VISIT ADD ON: HCPCS | Performed by: FAMILY MEDICINE

## 2024-06-25 PROCEDURE — 80061 LIPID PANEL: CPT | Performed by: FAMILY MEDICINE

## 2024-06-25 PROCEDURE — 36415 COLL VENOUS BLD VENIPUNCTURE: CPT | Performed by: FAMILY MEDICINE

## 2024-06-25 PROCEDURE — 99214 OFFICE O/P EST MOD 30 MIN: CPT | Performed by: FAMILY MEDICINE

## 2024-06-25 PROCEDURE — 80048 BASIC METABOLIC PNL TOTAL CA: CPT | Performed by: FAMILY MEDICINE

## 2024-06-25 RX ORDER — ASPIRIN 81 MG/1
81 TABLET, CHEWABLE ORAL DAILY
Qty: 90 TABLET | Refills: 3 | Status: SHIPPED | OUTPATIENT
Start: 2024-06-25

## 2024-06-25 RX ORDER — METOPROLOL SUCCINATE 25 MG/1
12.5 TABLET, EXTENDED RELEASE ORAL DAILY
Qty: 45 TABLET | Refills: 3 | Status: SHIPPED | OUTPATIENT
Start: 2024-06-25

## 2024-06-25 RX ORDER — RESPIRATORY SYNCYTIAL VIRUS VACCINE 120MCG/0.5
0.5 KIT INTRAMUSCULAR ONCE
Qty: 1 EACH | Refills: 0 | Status: SHIPPED | OUTPATIENT
Start: 2024-06-25 | End: 2024-06-25

## 2024-06-25 NOTE — LETTER
June 26, 2024    Brenna M Watkins  [address] [facility] [address]          Dear ,    We are writing to inform you of your test results.  Your results are normal.       Resulted Orders   BASIC METABOLIC PANEL   Result Value Ref Range    Sodium 140 135 - 145 mmol/L      Comment:      Reference intervals for this test were updated on 09/26/2023 to more accurately reflect our healthy population. There may be differences in the flagging of prior results with similar values performed with this method. Interpretation of those prior results can be made in the context of the updated reference intervals.     Potassium 4.7 3.4 - 5.3 mmol/L    Chloride 104 98 - 107 mmol/L    Carbon Dioxide (CO2) 29 22 - 29 mmol/L    Anion Gap 7 7 - 15 mmol/L    Urea Nitrogen 14.3 8.0 - 23.0 mg/dL    Creatinine 0.89 0.51 - 0.95 mg/dL    GFR Estimate 62 >60 mL/min/1.73m2      Comment:      eGFR calculated using 2021 CKD-EPI equation.    Calcium 10.0 8.8 - 10.2 mg/dL    Glucose 101 (H) 70 - 99 mg/dL    Patient Fasting > 8hrs? Yes    Lipid panel reflex to direct LDL Fasting   Result Value Ref Range    Cholesterol 204 (H) <200 mg/dL    Triglycerides 205 (H) <150 mg/dL    Direct Measure HDL 60 >=50 mg/dL    LDL Cholesterol Calculated 103 (H) <=100 mg/dL    Non HDL Cholesterol 144 (H) <130 mg/dL    Patient Fasting > 8hrs? Yes     Narrative    Cholesterol  Desirable:  <200 mg/dL    Triglycerides  Normal:  Less than 150 mg/dL  Borderline High:  150-199 mg/dL  High:  200-499 mg/dL  Very High:  Greater than or equal to 500 mg/dL    Direct Measure HDL  Female:  Greater than or equal to 50 mg/dL   Male:  Greater than or equal to 40 mg/dL    LDL Cholesterol  Desirable:  <100mg/dL  Above Desirable:  100-129 mg/dL   Borderline High:  130-159 mg/dL   High:  160-189 mg/dL   Very High:  >= 190 mg/dL    Non HDL Cholesterol  Desirable:  130 mg/dL  Above Desirable:  130-159 mg/dL  Borderline High:  160-189 mg/dL  High:  190-219 mg/dL  Very High:   Greater than or equal to 220 mg/dL       If you have any questions or concerns, please call the clinic at the number listed above.       Sincerely,      Jocelin Guajardo MD

## 2024-06-25 NOTE — PROGRESS NOTES
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   - BASIC METABOLIC PANEL; Future  - metoprolol succinate ER (TOPROL XL) 25 MG 24 hr tablet; Take 0.5 tablets (12.5 mg) by mouth daily    BMI  Estimated body mass index is 28.71 kg/m  as calculated from the following:    Height as of this encounter: 1.57 m (5' 1.81\").    Weight as of this encounter: 70.8 kg (156 lb).       The longitudinal plan of care for the diagnosis(es)/condition(s) as documented were addressed during this visit. Due to the added complexity in care, I will continue to support Brenna in the subsequent management and with ongoing continuity of care.    Follow-up for yearly wellness visit         Mora Ceja is a 89 year old, presenting for the following health issues:  Hypertension and Lipids        6/25/2024    10:29 AM   Additional Questions   Roomed by Shanda LARA CMA   Accompanied by Self         6/25/2024    10:29 AM   Patient Reported Additional Medications   Patient reports taking the following new medications Vitamin B-12     History of Present Illness       Back Pain:  She presents for follow up of back pain. Patient's back " "pain is a chronic problem.  Location of back pain:  Left lower back  Description of back pain: shooting  Back pain spreads: left buttocks, left thigh, left knee and left foot    Since patient first noticed back pain, pain is: unchanged  Does back pain interfere with her job:  Yes       Hypertension: She presents for follow up of hypertension.  She does not check blood pressure  regularly outside of the clinic. Outpatient blood pressures have not been over 140/90. She does not follow a low salt diet.     Vascular Disease:  She presents for follow up of vascular disease.     She never takes nitroglycerin. She is not taking daily aspirin.    She eats 2-3 servings of fruits and vegetables daily.She consumes 0 sweetened beverage(s) daily.She exercises with enough effort to increase her heart rate 10 to 19 minutes per day.  She exercises with enough effort to increase her heart rate 6 days per week.   She is taking medications regularly.                 Review of Systems  CONSTITUTIONAL: NEGATIVE for fever, chills, change in weight  ENT/MOUTH: NEGATIVE for ear, mouth and throat problems  RESP: chronic BEARDEN   CV: NEGATIVE for chest pain, palpitations or peripheral edema  MUSCULOSKELETAL: back pain and radicular pain    NEURO: right carpel tunnel syndrome   PSYCHIATRIC: caregiver stress       Objective    /83 (BP Location: Right arm, Patient Position: Sitting, Cuff Size: Adult Regular)   Pulse 81   Temp 98.1  F (36.7  C) (Oral)   Resp 22   Ht 1.57 m (5' 1.81\")   Wt 70.8 kg (156 lb)   SpO2 94%   BMI 28.71 kg/m    Body mass index is 28.71 kg/m .  Physical Exam   GENERAL: alert and no distress  EYES: Eyes grossly normal to inspection, PERRL and conjunctivae and sclerae normal  NECK: no adenopathy, no asymmetry, masses, or scars  RESP: lungs clear to auscultation - no rales, rhonchi or wheezes  CV: regular rate and rhythm, normal S1 S2, no S3 or S4, no murmur, click or rub, no peripheral edema  MS: extremities " normal- no gross deformities noted - wearing right wrist splint   PSYCH: mentation appears normal, affect normal/bright    Lab on 08/28/2023   Component Date Value Ref Range Status    Cholesterol 08/28/2023 193  <200 mg/dL Final    Triglycerides 08/28/2023 141  <150 mg/dL Final    Direct Measure HDL 08/28/2023 53  >=50 mg/dL Final    LDL Cholesterol Calculated 08/28/2023 112 (H)  <=100 mg/dL Final    Non HDL Cholesterol 08/28/2023 140 (H)  <130 mg/dL Final     No results found for this or any previous visit (from the past 24 hour(s)).        Signed Electronically by: Jocelin Guajardo MD

## 2024-07-03 ENCOUNTER — ANCILLARY PROCEDURE (OUTPATIENT)
Dept: BONE DENSITY | Facility: CLINIC | Age: 89
End: 2024-07-03
Attending: FAMILY MEDICINE
Payer: COMMERCIAL

## 2024-07-03 DIAGNOSIS — Z78.0 ASYMPTOMATIC POSTMENOPAUSAL STATUS: ICD-10-CM

## 2024-07-03 DIAGNOSIS — M85.80 LOW BONE MASS: ICD-10-CM

## 2024-07-03 PROCEDURE — 77080 DXA BONE DENSITY AXIAL: CPT | Mod: TC | Performed by: PHYSICIAN ASSISTANT

## 2024-07-03 PROCEDURE — 77081 DXA BONE DENSITY APPENDICULR: CPT | Mod: TC | Performed by: PHYSICIAN ASSISTANT

## 2024-07-03 NOTE — RESULT ENCOUNTER NOTE
Mail letter:    Your bone mass is low, called osteopenia. Your hip has improved but wrist bone strength has worsened.     Taking a weekly pill called alendronate can lower risk for broken bones. This is usually taken for 5 years. Let me know when you're ready for a prescription.     We can continue to follow this by repeating the test in 2 to 3 years. I recommend taking calcium 1200 mg daily (split into 2 doses) and vitamin D 1000 units daily, exercising regularly, and preventing falls.      Jocelin Guajardo MD

## 2024-07-03 NOTE — LETTER
July 5, 2024    Brenna Watkins  [address] [place], [place] [id number]          Dear ,    We are writing to inform you of your test results.    Your bone mass is low, called osteopenia. Your hip has improved but wrist bone strength has worsened.     Taking a weekly pill called alendronate can lower risk for broken bones. This is usually taken for 5 years. Let me know when you're ready for a prescription.     We can continue to follow this by repeating the test in 2 to 3 years. I recommend taking calcium 1200 mg daily (split into 2 doses) and vitamin D 1000 units daily, exercising regularly, and preventing falls.         Resulted Orders   DEXA HIP/PELVIS/SPINE - Future    Narrative    EXAM: DX AXIAL AND PERIPHERAL  LOCATION: [facility]  DATE: 7/3/2024    INDICATION: Low bone mass, postmenopausal.  DEMOGRAPHICS: Age- 89 years. Gender- Female. Menopausal status- Postmenopausal.  COMPARISON: 7/5/2017  TECHNIQUE: Dual-energy x-ray absorptiometry (DXA) performed with routine technique. Forearm DXA performed since the hip and/or spine could not be measured or interpreted.     FINDINGS:    DXA RESULTS  -Lumbar Spine: L1-L3: BMD: 1.277 g/cm2. T-score: 0.9. Z-score: 2.9. Degenerative change may artifactually increase BMD.  -RIGHT Hip Total: BMD: 0.855 g/cm2. T-score: -1.2. Z-score: 1.2.  -RIGHT Hip Femoral neck: BMD: 0.747 g/cm2. T-score: -2.1. Z-score: 0.4.  -LEFT Hip Total: BMD: 0.840 g/cm2. T-score: -1.3. Z-score: 1.1.  -LEFT Hip Femoral neck: BMD: 0.767 g/cm2. T-score: -2.0. Z-score: 0.5.  -LEFT FOREARM Radius 33%: BMD: 0.592 g/cm2. T-score: -1.7. Z-score: 1.7.    WHO T-SCORE CRITERIA  -Normal: T score at or above -1 SD  -Osteopenia: T score between -1 and -2.5 SD  -Osteoporosis: T score at or below -2.5 SD    The World Health Organization (WHO) criteria is applicable to perimenopausal females, postmenopausal females, and men aged 50 years or older.    INTERVAL CHANGE   -There has been a  3.0% increase in bilateral hip BMD.  -There has been a 9.0% decrease in the left forearm radius 33% BMD.    FRACTURE RISK  -FRAX Results: The 10 year probability of major osteoporotic fracture is 14.6%, and of hip fracture is 4.8%, based on right femoral neck BMD.    RECOMMENDATIONS  Consider treatment if major osteoporotic fracture score is greater than or equal to 20%, or if the hip fracture score is greater than or equal to 3%.      Impression    IMPRESSION: Low bone density (OSTEOPENIA). T score meets the WHO criteria for low bone density (osteopenia) at one or more measured sites. The risk of osteoporotic fracture increases approximately two-fold for each standard deviation decrease in T-score.       If you have any questions or concerns, please call the clinic at the number listed above.       Sincerely,      Jocelin Guajardo MD

## 2024-07-05 ENCOUNTER — TRANSFERRED RECORDS (OUTPATIENT)
Dept: HEALTH INFORMATION MANAGEMENT | Facility: CLINIC | Age: 89
End: 2024-07-05
Payer: COMMERCIAL

## 2024-07-10 ENCOUNTER — TELEPHONE (OUTPATIENT)
Dept: FAMILY MEDICINE | Facility: CLINIC | Age: 89
End: 2024-07-10
Payer: COMMERCIAL

## 2024-07-10 DIAGNOSIS — M85.80 LOW BONE MASS: Primary | ICD-10-CM

## 2024-07-10 RX ORDER — ALENDRONATE SODIUM 70 MG/1
70 TABLET ORAL
Qty: 12 TABLET | Refills: 3 | Status: SHIPPED | OUTPATIENT
Start: 2024-07-10

## 2024-07-10 NOTE — TELEPHONE ENCOUNTER
Pt calling in regards to letter mailed to her:    Your bone mass is low, called osteopenia. Your hip has improved but wrist bone strength has worsened.      Taking a weekly pill called alendronate can lower risk for broken bones. This is usually taken for 5 years. Let me know when you're ready for a prescription.      We can continue to follow this by repeating the test in 2 to 3 years. I recommend taking calcium 1200 mg daily (split into 2 doses) and vitamin D 1000 units daily, exercising regularly, and preventing falls.       Jocelin Guajardo MD       Pt is agreeable to starting alendronate and questions if pcp can send scripts for Vitamin D and calcium as she has already been taking OTC supplements of Vitamin D, B12 and Women's one a day with unknown dosages. Pt states that this way she can track how much she is to take in a day.     Chiquis Curiel, RN on 7/10/2024 at 10:12 AM

## 2024-12-03 ENCOUNTER — TRANSFERRED RECORDS (OUTPATIENT)
Dept: HEALTH INFORMATION MANAGEMENT | Facility: CLINIC | Age: 89
End: 2024-12-03
Payer: COMMERCIAL

## 2024-12-04 ENCOUNTER — TELEPHONE (OUTPATIENT)
Dept: FAMILY MEDICINE | Facility: CLINIC | Age: 89
End: 2024-12-04
Payer: COMMERCIAL

## 2024-12-04 NOTE — TELEPHONE ENCOUNTER
Patient Quality Outreach    Patient is due for the following:   Physical Annual Wellness Visit      Topic Date Due    Diptheria Tetanus Pertussis (DTAP/TDAP/TD) Vaccine (1 - Tdap) Never done    COVID-19 Vaccine (4 - 2024-25 season) 09/01/2024       Action(s) Taken:   Schedule a Annual Wellness Visit    Type of outreach:    Sent letter.    Questions for provider review:    None           Purnima Moreno, [facility]  Chart routed to Care Team.

## 2024-12-04 NOTE — LETTER
December 4, 2024    Brenna M Roland  [address] [facility] [address]      Your team at [facility] cares about your health. We have reviewed your chart and based on our findings; we are making the following recommendations to better manage your health.     You are in particular need of attention regarding the following:     PREVENTATIVE VISIT: Annual Medicare Wellness:Schedule an Annual Medicare Wellness Exam. Please call your [facility] [place] clinic to set up your appointment.    If you have already completed these items, please contact the clinic via phone or   MyChart so your care team can review and update your records. Thank you for   choosing [facility] for your healthcare needs. For any questions,   concerns, or to schedule an appointment please contact our clinic.    Healthy Regards,      Your [facility] Care Team

## 2024-12-04 NOTE — TELEPHONE ENCOUNTER
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

## 2024-12-04 NOTE — TELEPHONE ENCOUNTER
Patient called.  Informed her that Dr. Guajardo is not available on the dates she requested.    Preop scheduled on Monday, January 6th at 3 pm.    Karrie Harman,

## 2025-01-14 ENCOUNTER — ANCILLARY PROCEDURE (OUTPATIENT)
Dept: GENERAL RADIOLOGY | Facility: CLINIC | Age: 89
End: 2025-01-14
Attending: FAMILY MEDICINE
Payer: COMMERCIAL

## 2025-01-14 ENCOUNTER — OFFICE VISIT (OUTPATIENT)
Dept: FAMILY MEDICINE | Facility: CLINIC | Age: 89
End: 2025-01-14
Payer: COMMERCIAL

## 2025-01-14 VITALS
SYSTOLIC BLOOD PRESSURE: 139 MMHG | RESPIRATION RATE: 20 BRPM | HEART RATE: 89 BPM | DIASTOLIC BLOOD PRESSURE: 84 MMHG | TEMPERATURE: 98.2 F | BODY MASS INDEX: 27.6 KG/M2 | OXYGEN SATURATION: 94 % | WEIGHT: 150 LBS | HEIGHT: 62 IN

## 2025-01-14 DIAGNOSIS — I10 HYPERTENSION GOAL BP (BLOOD PRESSURE) < 140/90: ICD-10-CM

## 2025-01-14 DIAGNOSIS — Z29.11 NEED FOR VACCINATION AGAINST RESPIRATORY SYNCYTIAL VIRUS: ICD-10-CM

## 2025-01-14 DIAGNOSIS — M17.12 PRIMARY OSTEOARTHRITIS OF LEFT KNEE: ICD-10-CM

## 2025-01-14 DIAGNOSIS — I25.10 CORONARY ARTERY DISEASE INVOLVING NATIVE CORONARY ARTERY OF NATIVE HEART WITHOUT ANGINA PECTORIS: ICD-10-CM

## 2025-01-14 DIAGNOSIS — R20.8 DYSESTHESIA: ICD-10-CM

## 2025-01-14 DIAGNOSIS — Z01.818 PREOP GENERAL PHYSICAL EXAM: Primary | ICD-10-CM

## 2025-01-14 DIAGNOSIS — M20.41 HAMMERTOE OF RIGHT FOOT: ICD-10-CM

## 2025-01-14 DIAGNOSIS — Z23 NEED FOR TDAP VACCINATION: ICD-10-CM

## 2025-01-14 LAB
ANION GAP SERPL CALCULATED.3IONS-SCNC: 12 MMOL/L (ref 7–15)
BASOPHILS # BLD AUTO: 0 10E3/UL (ref 0–0.2)
BASOPHILS NFR BLD AUTO: 0 %
BUN SERPL-MCNC: 17.8 MG/DL (ref 8–23)
CALCIUM SERPL-MCNC: 9.3 MG/DL (ref 8.8–10.4)
CHLORIDE SERPL-SCNC: 105 MMOL/L (ref 98–107)
CREAT SERPL-MCNC: 0.88 MG/DL (ref 0.51–0.95)
EGFRCR SERPLBLD CKD-EPI 2021: 63 ML/MIN/1.73M2
EOSINOPHIL # BLD AUTO: 0 10E3/UL (ref 0–0.7)
EOSINOPHIL NFR BLD AUTO: 1 %
ERYTHROCYTE [DISTWIDTH] IN BLOOD BY AUTOMATED COUNT: 13.4 % (ref 10–15)
GLUCOSE SERPL-MCNC: 97 MG/DL (ref 70–99)
HCO3 SERPL-SCNC: 23 MMOL/L (ref 22–29)
HCT VFR BLD AUTO: 41.1 % (ref 35–47)
HGB BLD-MCNC: 13.6 G/DL (ref 11.7–15.7)
IMM GRANULOCYTES # BLD: 0 10E3/UL
IMM GRANULOCYTES NFR BLD: 0 %
LYMPHOCYTES # BLD AUTO: 1.3 10E3/UL (ref 0.8–5.3)
LYMPHOCYTES NFR BLD AUTO: 30 %
MCH RBC QN AUTO: 30.8 PG (ref 26.5–33)
MCHC RBC AUTO-ENTMCNC: 33.1 G/DL (ref 31.5–36.5)
MCV RBC AUTO: 93 FL (ref 78–100)
MONOCYTES # BLD AUTO: 0.6 10E3/UL (ref 0–1.3)
MONOCYTES NFR BLD AUTO: 15 %
NEUTROPHILS # BLD AUTO: 2.3 10E3/UL (ref 1.6–8.3)
NEUTROPHILS NFR BLD AUTO: 54 %
PLATELET # BLD AUTO: 244 10E3/UL (ref 150–450)
POTASSIUM SERPL-SCNC: 3.9 MMOL/L (ref 3.4–5.3)
RBC # BLD AUTO: 4.42 10E6/UL (ref 3.8–5.2)
SODIUM SERPL-SCNC: 140 MMOL/L (ref 135–145)
VIT B12 SERPL-MCNC: 2172 PG/ML (ref 232–1245)
WBC # BLD AUTO: 4.3 10E3/UL (ref 4–11)

## 2025-01-14 PROCEDURE — 73562 X-RAY EXAM OF KNEE 3: CPT | Mod: TC | Performed by: RADIOLOGY

## 2025-01-14 PROCEDURE — 36415 COLL VENOUS BLD VENIPUNCTURE: CPT | Performed by: FAMILY MEDICINE

## 2025-01-14 PROCEDURE — 99215 OFFICE O/P EST HI 40 MIN: CPT | Performed by: FAMILY MEDICINE

## 2025-01-14 PROCEDURE — G2211 COMPLEX E/M VISIT ADD ON: HCPCS | Performed by: FAMILY MEDICINE

## 2025-01-14 PROCEDURE — 82607 VITAMIN B-12: CPT | Performed by: FAMILY MEDICINE

## 2025-01-14 PROCEDURE — 85025 COMPLETE CBC W/AUTO DIFF WBC: CPT | Performed by: FAMILY MEDICINE

## 2025-01-14 PROCEDURE — 80048 BASIC METABOLIC PNL TOTAL CA: CPT | Performed by: FAMILY MEDICINE

## 2025-01-14 PROCEDURE — 93000 ELECTROCARDIOGRAM COMPLETE: CPT | Performed by: FAMILY MEDICINE

## 2025-01-14 NOTE — LETTER
January 14, 2025      Brenna Watkins  [address] [place] [id number]        Dear Brenna:    We are writing to inform you of your test results.    Your results are normal.     Resulted Orders   Vitamin B12   Result Value Ref Range    Vitamin B12 2,172 (H) 232 - 1,245 pg/mL   Basic metabolic panel  (Ca, Cl, CO2, Creat, Gluc, K, Na, BUN)   Result Value Ref Range    Sodium 140 135 - 145 mmol/L    Potassium 3.9 3.4 - 5.3 mmol/L    Chloride 105 98 - 107 mmol/L    Carbon Dioxide (CO2) 23 22 - 29 mmol/L    Anion Gap 12 7 - 15 mmol/L    Urea Nitrogen 17.8 8.0 - 23.0 mg/dL    Creatinine 0.88 0.51 - 0.95 mg/dL    GFR Estimate 63 >60 mL/min/1.73m2      Comment:      eGFR calculated using 2021 CKD-EPI equation.    Calcium 9.3 8.8 - 10.4 mg/dL      Comment:      Reference intervals for this test were updated on 7/16/2024 to reflect our healthy population more accurately. There may be differences in the flagging of prior results with similar values performed with this method. Those prior results can be interpreted in the context of the updated reference intervals.    Glucose 97 70 - 99 mg/dL   CBC with platelets and differential   Result Value Ref Range    WBC Count 4.3 4.0 - 11.0 10e3/uL    RBC Count 4.42 3.80 - 5.20 10e6/uL    Hemoglobin 13.6 11.7 - 15.7 g/dL    Hematocrit 41.1 35.0 - 47.0 %    MCV 93 78 - 100 fL    MCH 30.8 26.5 - 33.0 pg    MCHC 33.1 31.5 - 36.5 g/dL    RDW 13.4 10.0 - 15.0 %    Platelet Count 244 150 - 450 10e3/uL    % Neutrophils 54 %    % Lymphocytes 30 %    % Monocytes 15 %    % Eosinophils 1 %    % Basophils 0 %    % Immature Granulocytes 0 %    Absolute Neutrophils 2.3 1.6 - 8.3 10e3/uL    Absolute Lymphocytes 1.3 0.8 - 5.3 10e3/uL    Absolute Monocytes 0.6 0.0 - 1.3 10e3/uL    Absolute Eosinophils 0.0 0.0 - 0.7 10e3/uL    Absolute Basophils 0.0 0.0 - 0.2 10e3/uL    Absolute Immature Granulocytes 0.0 <=0.4 10e3/uL     If you have any questions or concerns, please call the clinic at the number  listed above.     Sincerely,      Jocelin Guajardo MD/mary    Electronically signed

## 2025-01-14 NOTE — PATIENT INSTRUCTIONS
How to Take Your Medication Before Surgery  Preoperative Medication Instructions   Antiplatelet or Anticoagulation Medication Instructions   - aspirin: Discontinue aspirin 7 days prior to procedure to reduce bleeding risk. It should be resumed postoperatively.     Additional Medication Instructions  Take all scheduled medications on the day of surgery EXCEPT for modifications listed below:   - Herbal medications and vitamins: DO NOT TAKE 14 days prior to surgery.       Patient Education   Preparing for Your Surgery  For Adults  Getting started  In most cases, a nurse will call to review your health history and instructions. They will give you an arrival time based on your scheduled surgery time. Please be ready to share:  Your doctor's clinic name and phone number  Your medical, surgical, and anesthesia history  A list of allergies and sensitivities  A list of medicines, including herbal treatments and over-the-counter drugs  Whether the patient has a legal guardian (ask how to send us the papers in advance)  Note: You may not receive a call if you were seen at our PAC (Preoperative Assessment Center).  Please tell us if you're pregnant--or if there's any chance you might be pregnant. Some surgeries may injure a fetus (unborn baby), so they require a pregnancy test. Surgeries that are safe for a fetus don't always need a test, and you can choose whether to have one.   Preparing for surgery  Within 10 to 30 days of surgery: Have a pre-op exam (sometimes called an H&P, or History and Physical). This can be done at a clinic or pre-operative center.  If you're having a , you may not need this exam. Talk to your care team.  At your pre-op exam, talk to your care team about all medicines you take. (This includes CBD oil and any drugs, such as THC, marijuana, and other forms of cannabis.) If you need to stop any medicine before surgery, ask when to start taking it again.  This is for your safety. Many medicines  and drugs can make you bleed too much during surgery. Some change how well surgery (anesthesia) drugs work.  Call your insurance company to let them know you're having surgery. (If you don't have insurance, call [phone].)  Call your clinic if there's any change in your health. This includes a scrape or scratch near the surgery site, or any signs of a cold (sore throat, runny nose, cough, rash, fever).  Eating and drinking guidelines  For your safety: Unless your surgeon tells you otherwise, follow the guidelines below.  Eat and drink as normal until 8 hours before you arrive for surgery. After that, no food or milk. You can spit out gum when you arrive.  Drink clear liquids until 2 hours before you arrive. These are liquids you can see through, like water, Gatorade, and Propel Water. They also include plain black coffee and tea (no cream or milk).  No alcohol for 24 hours before you arrive. The night before surgery, stop any drinks that contain THC.  If your care team tells you to take medicine on the morning of surgery, it's okay to take it with a sip of water. No other medicines or drugs are allowed (including CBD oil)--follow your care team's instructions.  If you have questions the day of surgery, call your hospital or surgery center.   Preventing infection  Shower or bathe the night before and the morning of surgery. Follow the instructions your clinic gave you. (If no instructions, use regular soap.)  Don't shave or clip hair near your surgery site. We'll remove the hair if needed.  Don't smoke or vape the morning of surgery. No chewing tobacco for 6 hours before you arrive. A nicotine patch is okay. You may spit out nicotine gum when you arrive.  For some surgeries, the surgeon will tell you to fully quit smoking and nicotine.  We will make every effort to keep you safe from infection. We will:  Clean our hands often with soap and water (or an alcohol-based hand rub).  Clean the skin at your surgery site  with a special soap that kills germs.  Give you a special gown to keep you warm. (Cold raises the risk of infection.)  Wear hair covers, masks, gowns, and gloves during surgery.  Give antibiotic medicine, if prescribed. Not all surgeries need this medicine.  What to bring on the day of surgery  Photo ID and insurance card  Copy of your health care directive, if you have one  Glasses and hearing aids (bring cases)  You can't wear contacts during surgery  Inhaler and eye drops, if you use them (tell us about these when you arrive)  CPAP machine or breathing device, if you use them  A few personal items, if spending the night  If you have . . .  A pacemaker, ICD (cardiac defibrillator), or other implant: Bring the ID card.  An implanted stimulator: Bring the remote control.  A legal guardian: Bring a copy of the certified (court-stamped) guardianship papers.  Please remove any jewelry, including body piercings. Leave jewelry and other valuables at home.  If you're going home the day of surgery  You must have a responsible adult drive you home. They should stay with you overnight as well.  If you don't have someone to stay with you, and you aren't safe to go home alone, we may keep you overnight. Insurance often won't pay for this.  After surgery  If it's hard to control your pain or you need more pain medicine, please call your surgeon's office.  Questions?   If you have any questions for your care team, list them here:   ____________________________________________________________________________________________________________________________________________________________________________________________________________________________________________________________  For informational purposes only. Not to replace the advice of your health care provider. Copyright   2003, 2019 [facility]. All rights reserved. Clinically reviewed by Prince Sexton MD. [employer] [id number] - REV 08/24.

## 2025-01-14 NOTE — LETTER
January 15, 2025      Brenna Watkins  [address] [place] [id number]        Dear Brenna:    We are writing to inform you of your test results.    Your x-ray confirms left knee arthritis. There is also some fluid in your knee, so an additional soft tissue injury is possible. Let me know if you'd like an orthopedic surgery referral or follow-up with me to discuss.     Resulted Orders   XR Knee Left 3 Views    Narrative    EXAM: XR KNEE LEFT 3 VIEWS  LOCATION: [facility]  DATE: 1/14/2025    INDICATION:  Primary osteoarthritis of left knee  COMPARISON: None.      Impression    IMPRESSION: Mild to moderate tricompartmental arthritic changes. There is a joint effusion. No evidence of an acute fracture. Atherosclerotic vascular calcifications.     If you have any questions or concerns, please call the clinic at the number listed above.     Sincerely,      Jocelin Guajardo MD/mary    Electronically signed

## 2025-01-14 NOTE — PROGRESS NOTES
Preoperative Evaluation  [facility]  [address]  [place] [address]  Phone: [phone]  Primary Provider: Jocelin Guajardo MD  Pre-op Performing Provider: Jocelin Guajardo MD  Jan 14, 2025 1/14/2025   Surgical Information   What procedure is being done? Right foot second toe amputation.    Facility or Hospital where procedure/surgery will be performed: [facility]    Who is doing the procedure / surgery? Dr. Jordan   Date of surgery / procedure: 1/24/25    Time of surgery / procedure: Unknown    Where do you plan to recover after surgery? at home with family        Proxy-reported     Fax number for surgical facility: [phone]    Assessment & Plan     The proposed surgical procedure is considered INTERMEDIATE risk.    Preop general physical exam  Hammertoe of right foot    Coronary artery disease involving native coronary artery of native heart without angina pectoris  Hypertension goal BP (blood pressure) < 140/90  Stable; chronic     Dysesthesia  - Vitamin B12; Future  - CBC with platelets and differential; Future  - Basic metabolic panel  (Ca, Cl, CO2, Creat, Gluc, K, Na, BUN); Future    Return in about 1 month (around 2/14/2025) for RN Wellness Visit.       - No identified additional risk factors other than previously addressed    Preoperative Medication Instructions  Antiplatelet or Anticoagulation Medication Instructions   - aspirin: Discontinue aspirin 7 days prior to procedure to reduce bleeding risk. It should be resumed postoperatively.     Additional Medication Instructions  Take all scheduled medications on the day of surgery EXCEPT for modifications listed below:   - Herbal medications and vitamins: DO NOT TAKE 14 days prior to surgery.    Recommendation  Approval given to proceed with proposed procedure, without further diagnostic evaluation.    Mora Ceja is a 89 year old, presenting for the following:  Pre-Op Exam and  Patient Request (Would like x-ray of left leg. )          1/14/2025     8:31 AM   Additional Questions   Roomed by Shanda LARA CMA   Accompanied by Self         1/14/2025     8:31 AM   Patient Reported Additional Medications   Patient reports taking the following new medications None     HPI related to upcoming procedure: Brenna Watkins is a 89 year old female who presents with left 2nd toe deformity resulting in pain. Symptom onset has been gradual, worsening for a time period of years. Severity is described as moderate and localized. Course of her symptoms over time is worsening.         1/14/2025   Pre-Op Questionnaire   Have you ever had a heart attack or stroke? No    Have you ever had surgery on your heart or blood vessels, such as a stent placement, a coronary artery bypass, or surgery on an artery in your head, neck, heart, or legs? (!) YES 2021    Do you have chest pain with activity? (!) YES chronic    Do you have a history of heart failure? No    Do you currently have a cold, bronchitis or symptoms of other infection? No    Do you have a cough, shortness of breath, or wheezing? (!) YES chronic    Do you or anyone in your family have previous history of blood clots? No    Do you or does anyone in your family have a serious bleeding problem such as prolonged bleeding following surgeries or cuts? No    Have you ever had problems with anemia or been told to take iron pills? No    Have you had any abnormal blood loss such as black, tarry or bloody stools, or abnormal vaginal bleeding? No    Have you ever had a blood transfusion? No    Are you willing to have a blood transfusion if it is medically needed before, during, or after your surgery? Yes    Have you or any of your relatives ever had problems with anesthesia? No    Do you have sleep apnea, excessive snoring or daytime drowsiness? No    Do you have any artifical heart valves or other implanted medical devices like a pacemaker, defibrillator, or continuous  glucose monitor? No    Do you have artificial joints? No    Are you allergic to latex? No        Proxy-reported     Health Care Directive  Patient does not have a Health Care Directive: Discussed advance care planning with patient; information given to patient to review.    Preoperative Review of    reviewed - no record of controlled substances prescribed.       Status of Chronic Conditions:  See problem list for active medical problems.  Problems all longstanding and stable, except as noted/documented.  See ROS for pertinent symptoms related to these conditions.    Patient Active Problem List    Diagnosis Date Noted    CAD (coronary artery disease)      Priority: High    DJD (degenerative joint disease) of knee      Priority: Medium    Hyperlipidemia LDL goal <70      Priority: Medium    Lumbar radiculopathy 01/16/2019     Priority: Medium    Low bone mass      Priority: Medium    Hypertension goal BP (blood pressure) < 140/90 07/05/2017     Priority: Medium    Cerebral aneurysm, nonruptured 10/24/2011     Priority: Medium    DDD (degenerative disc disease), cervical 03/09/2022     Priority: Low    GERD without esophagitis      Priority: Low    CTS (carpal tunnel syndrome)      Priority: Low      Past Medical History:   Diagnosis Date    Brain aneurysm     Bunion     CAD (coronary artery disease)     CTS (carpal tunnel syndrome)     DDD (degenerative disc disease), cervical     DJD (degenerative joint disease) of knee     GERD without esophagitis     Hyperlipidemia LDL goal <70     Hypertension goal BP (blood pressure) < 140/90 07/05/2017     Past Surgical History:   Procedure Laterality Date    APPENDECTOMY      AS REVISE ULNAR NERVE AT ELBOW  2018    BACK SURGERY  2015    CARPAL TUNNEL RELEASE RT/LT Right     CRANIOTOMY, REPAIR ANEURYSM, COMBINED      CV CORONARY ANGIOGRAM N/A 6/1/2021    Procedure: CV CORONARY ANGIOGRAM;  Surgeon: Syed Zayas MD;  [facility]     CV PCI STENT DRUG ELUTING N/A 6/1/2021    Procedure: Percutaneous Coronary Intervention Stent Drug Eluting;  Surgeon: Syed Zayas MD;  Location: [facility]ARD[facility] C[facility]    IR CAROTID ANGIOGRAM  1/26/2012    IR CAROTID ANGIOGRAM  1/26/2012    IR MISCELLANEOUS PROCEDURE  1/26/2012    STRIP VEIN      TOE SURGERY  03/2018     Current Outpatient Medications   Medication Sig Dispense Refill    aspirin (ASA) 81 MG chewable tablet Take 1 tablet (81 mg) by mouth daily 90 tablet 3    atorvastatin (LIPITOR) 40 MG tablet TAKE 1 TABLET(40 MG) BY MOUTH DAILY 90 tablet 3    calcium carbonate-vitamin D (OSCAL) 500-5 MG-MCG tablet Take 1 tablet by mouth 2 times daily 180 tablet 3    diphenhydrAMINE-acetaminophen (TYLENOL PM)  MG tablet Take 2 tablets by mouth nightly as needed for sleep      metoprolol succinate ER (TOPROL XL) 25 MG 24 hr tablet Take 0.5 tablets (12.5 mg) by mouth daily 45 tablet 3    multivitamin w/minerals (THERA-VIT-M) tablet Take 1 tablet by mouth daily      Omeprazole (PRILOSEC PO) Take 20 mg by mouth daily      RSV vaccine, bivalent, ABRYSVO, injection Inject 0.5 mLs into the muscle once for 1 dose. Pharmacist administered 0.5 mL 0    Tdap, tetanus-diptheria-acell pertussis, (BOOSTRIX) 5-2.5-18.5 LF-MCG/0.5 SANG injection Inject 0.5 mLs into the muscle once for 1 dose. 0.5 mL 0       Allergies   Allergen Reactions    Alendronate      Leg spasms    Pcn [Penicillins]         Social History     Tobacco Use    Smoking status: Never     Passive exposure: Never    Smokeless tobacco: Never   Substance Use Topics    Alcohol use: Yes     Alcohol/week: 1.0 - 2.0 standard drink of alcohol     Types: 1 - 2 Glasses of wine per week     Family History   Problem Relation Age of Onset    Sudden Death Mother         d. childbirth     Alcoholism Father 62    Myocardial Infarction Brother 68    Alzheimer Disease Sister     Diabetes No family hx of     Cancer No family hx of      History   Drug Use No  "            Review of Systems  CONSTITUTIONAL: NEGATIVE for fever, chills, change in weight  INTEGUMENTARY/SKIN: NEGATIVE for worrisome rashes, moles or lesions  ENT/MOUTH: NEGATIVE for ear, mouth and throat problems  RESP: chronic dyspnea   CV: chronic chest pain   GI: NEGATIVE for nausea, abdominal pain, heartburn, or change in bowel habits  : NEGATIVE for new symptoms   MUSCULOSKELETAL: see HPI, left lower extremity pain   NEURO: dysesthesias of bilateral feet for months   HEME/ALLERGY/IMMUNE: NEGATIVE for bleeding problems  PSYCHIATRIC: NEGATIVE for changes in mood or affect    Objective    /84 (BP Location: Left arm, Patient Position: Sitting, Cuff Size: Adult Regular)   Pulse 89   Temp 98.2  F (36.8  C) (Oral)   Resp 20   Ht 1.565 m (5' 1.61\")   Wt 68 kg (150 lb)   SpO2 94%   BMI 27.78 kg/m     Estimated body mass index is 27.78 kg/m  as calculated from the following:    Height as of this encounter: 1.565 m (5' 1.61\").    Weight as of this encounter: 68 kg (150 lb).  Physical Exam  GENERAL: alert and no distress  EYES: Eyes grossly normal to inspection, PERRL and conjunctivae and sclerae normal  HENT: ear canals and TM's normal, nose and mouth without ulcers or lesions  NECK: no adenopathy, no asymmetry, masses, or scars  RESP: lungs clear to auscultation - no rales, rhonchi or wheezes  CV: regular rate and rhythm, normal S1 S2, no S3 or S4, no murmur, click or rub, no peripheral edema  ABDOMEN: soft, nontender, no hepatosplenomegaly, no masses and bowel sounds normal  MS: bunions and hammertoes; normal gait   SKIN: no suspicious lesions or rashes  NEURO: Normal strength and tone, mentation intact and speech normal  PSYCH: mentation appears normal, affect normal/bright    Recent Labs   Lab Test 06/25/24  1103      POTASSIUM 4.7   CR 0.89        Diagnostics  No labs were ordered during this visit.   EKG required for known coronary heart disease and not completed in the last 90 days. "     Revised Cardiac Risk Index (RCRI)  The patient has the following serious cardiovascular risks for perioperative complications:   - Coronary Artery Disease (MI, positive stress test, angina, Qs on EKG) = 1 point     RCRI Interpretation: 1 point: Class II (low risk - 0.9% complication rate)         Signed Electronically by: Jocelin Guajardo MD  A copy of this evaluation report is provided to the requesting physician.

## 2025-01-15 NOTE — RESULT ENCOUNTER NOTE
Mail letter:    Your xray confirms left knee arthritis. There is also some fluid in your knee, so an additional soft tissue injury is possible. Let me know if you'd like an orthopedic surgery referral or follow-up with me to discuss.     Jocelin Guajardo MD

## 2025-01-16 ENCOUNTER — TELEPHONE (OUTPATIENT)
Dept: FAMILY MEDICINE | Facility: CLINIC | Age: 89
End: 2025-01-16
Payer: COMMERCIAL

## 2025-01-16 NOTE — TELEPHONE ENCOUNTER
Here on Tuesday for a EKG. Concerned that she needs another EKG before her procedure, because the EKG wasn't reading correctly.     Nurse put the patient on hold to talk with the MA who did the EKG to talk about the situation. The MA stated that Dr. Guajardo saw that one of the lines wasn't reading on the EKG, but gave the ok.    Relayed this information to the patient. Patient was hesitant to accept the results, because of how quickly Dr. Guajardo was in and out of the room. Patient did not like how little time Dr. Guajardo spent with the patient. Patient noted that she's hesitant to have another appointment here, because she feels the doctors have to rush at times.    Nurse reinforced the fact that Dr. Guajardo wrote a note around 1745, which shows that she looked at the EKG again later in the day. The patient was ok with this.    Deepa Valles RN on 1/16/2025 at 11:24 AM

## 2025-02-04 ENCOUNTER — TRANSFERRED RECORDS (OUTPATIENT)
Dept: HEALTH INFORMATION MANAGEMENT | Facility: CLINIC | Age: 89
End: 2025-02-04
Payer: COMMERCIAL

## 2025-02-11 ENCOUNTER — TRANSFERRED RECORDS (OUTPATIENT)
Dept: HEALTH INFORMATION MANAGEMENT | Facility: CLINIC | Age: 89
End: 2025-02-11
Payer: COMMERCIAL

## 2025-03-18 ENCOUNTER — TRANSFERRED RECORDS (OUTPATIENT)
Dept: HEALTH INFORMATION MANAGEMENT | Facility: CLINIC | Age: 89
End: 2025-03-18
Payer: COMMERCIAL

## 2025-04-07 ENCOUNTER — NURSE TRIAGE (OUTPATIENT)
Dept: FAMILY MEDICINE | Facility: CLINIC | Age: 89
End: 2025-04-07

## 2025-04-07 NOTE — TELEPHONE ENCOUNTER
"Reason for Disposition    Fever and red area (or area very tender to touch)    Additional Information    Negative: Sounds like a life-threatening emergency to the triager    Negative: Chest pain    Negative: Followed an insect bite and has localized swelling (e.g., small area of puffy or swollen skin)    Negative: Followed a knee injury    Negative: Ankle injury    Negative: Pregnant with leg swelling or edema    Negative: Difficulty breathing at rest    Negative: Entire foot is cool or blue in comparison to other side    Negative: SEVERE swelling (e.g., swelling extends above knee, entire leg is swollen, weeping fluid)    Answer Assessment - Initial Assessment Questions  1. ONSET: \"When did the swelling start?\" (e.g., minutes, hours, days)      1.5 weeks   2. LOCATION: \"What part of the leg is swollen?\"  \"Are both legs swollen or just one leg?\"      Both legs right side worse   3. SEVERITY: \"How bad is the swelling?\" (e.g., localized; mild, moderate, severe)      Foot to knee   4. REDNESS: \"Does the swelling look red or infected?\"      Denies redness   5. PAIN: \"Is the swelling painful to touch?\" If Yes, ask: \"How painful is it?\"   (Scale 1-10; mild, moderate or severe)      8/10 for last 2 days  6. FEVER: \"Do you have a fever?\" If Yes, ask: \"What is it, how was it measured, and when did it start?\"       No   7. CAUSE: \"What do you think is causing the leg swelling?\"      Toe amputation end of January 8. MEDICAL HISTORY: \"Do you have a history of blood clots (e.g., DVT), cancer, heart failure, kidney disease, or liver failure?\"      Stents 3 years ago.     9. RECURRENT SYMPTOM: \"Have you had leg swelling before?\" If Yes, ask: \"When was the last time?\" \"What happened that time?\"      No   10. OTHER SYMPTOMS: \"Do you have any other symptoms?\" (e.g., chest pain, difficulty breathing)        Pt is reporting SOB,  denies weight gain.    Large indent, can't get shoes on.   Difficult to wear sock.   11. PREGNANCY: \"Is " "there any chance you are pregnant?\" \"When was your last menstrual period?\"        No    Protocols used: Leg Swelling and Edema-A-OH    "

## 2025-04-09 ENCOUNTER — ANCILLARY PROCEDURE (OUTPATIENT)
Dept: GENERAL RADIOLOGY | Facility: CLINIC | Age: 89
End: 2025-04-09
Attending: NURSE PRACTITIONER
Payer: COMMERCIAL

## 2025-04-09 ENCOUNTER — OFFICE VISIT (OUTPATIENT)
Dept: FAMILY MEDICINE | Facility: CLINIC | Age: 89
End: 2025-04-09
Payer: COMMERCIAL

## 2025-04-09 ENCOUNTER — TELEPHONE (OUTPATIENT)
Dept: CARDIOLOGY | Facility: CLINIC | Age: 89
End: 2025-04-09

## 2025-04-09 VITALS
SYSTOLIC BLOOD PRESSURE: 140 MMHG | TEMPERATURE: 97.8 F | DIASTOLIC BLOOD PRESSURE: 72 MMHG | HEART RATE: 72 BPM | BODY MASS INDEX: 30.19 KG/M2 | OXYGEN SATURATION: 96 % | WEIGHT: 163 LBS | RESPIRATION RATE: 19 BRPM

## 2025-04-09 DIAGNOSIS — R60.0 BILATERAL LOWER EXTREMITY EDEMA: Primary | ICD-10-CM

## 2025-04-09 DIAGNOSIS — I25.10 CORONARY ARTERY DISEASE INVOLVING NATIVE CORONARY ARTERY OF NATIVE HEART WITHOUT ANGINA PECTORIS: ICD-10-CM

## 2025-04-09 DIAGNOSIS — I25.118 CORONARY ARTERY DISEASE OF NATIVE ARTERY OF NATIVE HEART WITH STABLE ANGINA PECTORIS: ICD-10-CM

## 2025-04-09 DIAGNOSIS — R60.0 BILATERAL LOWER EXTREMITY EDEMA: ICD-10-CM

## 2025-04-09 DIAGNOSIS — R06.09 DYSPNEA ON EXERTION: ICD-10-CM

## 2025-04-09 PROCEDURE — 3078F DIAST BP <80 MM HG: CPT | Performed by: NURSE PRACTITIONER

## 2025-04-09 PROCEDURE — 1125F AMNT PAIN NOTED PAIN PRSNT: CPT | Performed by: NURSE PRACTITIONER

## 2025-04-09 PROCEDURE — 3077F SYST BP >= 140 MM HG: CPT | Performed by: NURSE PRACTITIONER

## 2025-04-09 PROCEDURE — 71046 X-RAY EXAM CHEST 2 VIEWS: CPT | Mod: TC | Performed by: RADIOLOGY

## 2025-04-09 PROCEDURE — 99214 OFFICE O/P EST MOD 30 MIN: CPT | Performed by: NURSE PRACTITIONER

## 2025-04-09 RX ORDER — FUROSEMIDE 20 MG/1
20 TABLET ORAL DAILY PRN
Qty: 7 TABLET | Refills: 0 | Status: SHIPPED | OUTPATIENT
Start: 2025-04-09

## 2025-04-09 ASSESSMENT — PAIN SCALES - GENERAL: PAINLEVEL_OUTOF10: SEVERE PAIN (8)

## 2025-04-09 ASSESSMENT — ENCOUNTER SYMPTOMS
FEVER: 0
SHORTNESS OF BREATH: 1
CHILLS: 0

## 2025-04-09 NOTE — TELEPHONE ENCOUNTER
Called patient to schedule follow up with General MD. Currently has an EP referral- should be General. Patient has seen Can in [place] in the past, but she can schedule with someone else if she would prefer.

## 2025-04-09 NOTE — PATIENT INSTRUCTIONS
- Please discontinue ibuprofen and use acetaminophen (tylenol) for pain reflief  - We will check labs, depending on your potassium level, we may need you to take a potassium supplement.  - Please schedule follow-up with podiatrist, Dr. Jordan  - On days you take furosemide, rise slowly  - Schedule appointment with Cardiology  -Follow-up in 2-3 weeks, sooner if needed

## 2025-04-09 NOTE — PROGRESS NOTES
Assessment & Plan     1. Bilateral lower extremity edema    2. Coronary artery disease involving native coronary artery of native heart without angina pectoris    3. Dyspnea on exertion      First noticed bilateral leg swelling approx 10 days ago.   Since surgery in January/2025 (R second toe amputation), has had worsening SOBOE. Reports 10lb weight gain over past week. Has had worsening R foot pain since leg swelling increased. Has podiatrist, Dr. Jordan. No recent trauma to foot.  Admits to ibuprofen use daily since surgery. Interested in diuretic.    Patient is afebrile and in no acute distress with clear lung sounds.  +2 BLE edema noted.    CXR (Wet read): No pleural effusions noted. Awaiting Radiologist review.    Refer to Cardiology considering symptoms and her hx CAD/Pulm HTN. Advised low Na+ diet.  Advised patient to discontinue ibuprofen and use acetaminophen for pain relief.  Advised follow-up with podiatrist.  Discussed furosemide prn (patient is naive to diuretics). I discussed with the patient risks and benefits of the new medication prescribed including potential side effects.  The patient had opportunity to ask questions and is comfortable with and interested in medications as prescribed.       - XR Chest 2 Views; Future  - CBC with platelets and differential; Future  - Comprehensive metabolic panel (BMP + Alb, Alk Phos, ALT, AST, Total. Bili, TP); Future  - TSH with free T4 reflex; Future  - Adult Cardiology Eval  Referral; Future  - furosemide (LASIX) 20 MG tablet; Take 1 tablet (20 mg) by mouth daily as needed (leg swelling).  Dispense: 7 tablet; Refill: 0          -Follow-up in 2-3 weeks, sooner if needed      All questions/concerns addressed. Patient stated understanding/agreement to plan of care.        Note: Chart documentation was done in part with Dragon Voice Recognition software.  Although reviewed after completion, some word and grammatical errors may remain. Please  contact author for any clarification or concerns.        Patient Instructions   - Please discontinue ibuprofen and use acetaminophen (tylenol) for pain reflief  - We will check labs, depending on your potassium level, we may need you to take a potassium supplement.  - Please schedule follow-up with podiatrist, Dr. Jordan  - On days you take furosemide, rise slowly  - Schedule appointment with Cardiology  -Follow-up in 2-3 weeks, sooner if needed    Subjective   Brenna is a 89 year old, presenting for the following health issues:  Bilateral leg swelling (Has been going on for about 1 week with pain 8/10)    History of Present Illness       Reason for visit:  Swelling of both legs  Symptom onset:  1-2 weeks ago  Symptoms include:  Pain and swelling  Symptom intensity:  Moderate  Symptom progression:  Worsening  Had these symptoms before:  No  What makes it worse:  Nothing  What makes it better:  Nothing   She is taking medications regularly.          Swelling of lower extremities and SOB when walking up the stairs.  First noticed bilateral leg swelling approx 10 days ago.   Since surgery in January/2025 (R second toe amputation), has had worsening SOBOE. Reports 10lb weight gain over past week. Has had worsening R foot pain since leg swelling increased. Has podiatrist, Dr. Jordan. No recent trauma to foot.  Admits to ibuprofen use daily since surgery. Interested in diuretic.  No other acute concerns/symptoms at time of exam.      Review of Systems   Constitutional:  Negative for chills and fever.   Respiratory:  Positive for shortness of breath.    Cardiovascular:  Positive for peripheral edema. Negative for chest pain.   Constitutional, HEENT, cardiovascular, pulmonary, gi and gu systems are negative, except as otherwise noted.      Past Medical History:   Diagnosis Date    Brain aneurysm     Bunion     CAD (coronary artery disease)     CTS (carpal tunnel syndrome)     DDD (degenerative disc disease),  cervical     DJD (degenerative joint disease) of knee     GERD without esophagitis     Hyperlipidemia LDL goal <70     Hypertension goal BP (blood pressure) < 140/90 07/05/2017       Past Surgical History:   Procedure Laterality Date    APPENDECTOMY      AS REVISE ULNAR NERVE AT ELBOW  2018    BACK SURGERY  2015    CARPAL TUNNEL RELEASE RT/LT Right     CRANIOTOMY, REPAIR ANEURYSM, COMBINED      CV CORONARY ANGIOGRAM N/A 6/1/2021    Procedure: CV CORONARY ANGIOGRAM;  Surgeon: Syed Zayas MD;  Location:  [facility]    CV PCI STENT DRUG ELUTING N/A 6/1/2021    Procedure: Percutaneous Coronary Intervention Stent Drug Eluting;  Surgeon: Syed Zayas MD;  Location:  [facility] CATH LAB    IR CAROTID ANGIOGRAM  1/26/2012    IR CAROTID ANGIOGRAM  1/26/2012    IR MISCELLANEOUS PROCEDURE  1/26/2012    STRIP VEIN      TOE SURGERY  03/2018       Family History   Problem Relation Age of Onset    Sudden Death Mother         d. childbirth     Alcoholism Father 62    Myocardial Infarction Brother 68    Alzheimer Disease Sister     Diabetes No family hx of     Cancer No family hx of        Social History     Tobacco Use    Smoking status: Never     Passive exposure: Never    Smokeless tobacco: Never   Substance Use Topics    Alcohol use: Yes     Alcohol/week: 1.0 - 2.0 standard drink of alcohol     Types: 1 - 2 Glasses of wine per week       Current Outpatient Medications   Medication Sig Dispense Refill    aspirin (ASA) 81 MG chewable tablet Take 1 tablet (81 mg) by mouth daily 90 tablet 3    atorvastatin (LIPITOR) 40 MG tablet TAKE 1 TABLET(40 MG) BY MOUTH DAILY 90 tablet 3    calcium carbonate-vitamin D (OSCAL) 500-5 MG-MCG tablet Take 1 tablet by mouth 2 times daily 180 tablet 3    diphenhydrAMINE-acetaminophen (TYLENOL PM)  MG tablet Take 2 tablets by mouth nightly as needed for sleep      metoprolol succinate ER (TOPROL XL) 25 MG 24 hr tablet Take 0.5 tablets (12.5  mg) by mouth daily 45 tablet 3    multivitamin w/minerals (THERA-VIT-M) tablet Take 1 tablet by mouth daily      Omeprazole (PRILOSEC PO) Take 20 mg by mouth daily       No current facility-administered medications for this visit.         Objective        BP (!) 140/72 (BP Location: Right arm, Patient Position: Sitting, Cuff Size: Adult Large)   Pulse 72   Temp 97.8  F (36.6  C) (Temporal)   Resp 19   Wt 73.9 kg (163 lb)   SpO2 96%   BMI 30.19 kg/m    Wt Readings from Last 4 Encounters:   25 73.9 kg (163 lb)   25 68 kg (150 lb)   24 70.8 kg (156 lb)   23 71.2 kg (157 lb)         Physical Exam  Constitutional:       General: She is not in acute distress.     Appearance: She is not ill-appearing.   Eyes:      Extraocular Movements: Extraocular movements intact.   Cardiovascular:      Rate and Rhythm: Normal rate and regular rhythm.      Heart sounds: Normal heart sounds. No murmur heard.  Pulmonary:      Effort: Pulmonary effort is normal. No respiratory distress.      Breath sounds: Normal breath sounds. No wheezing or rales.   Musculoskeletal:      Cervical back: Neck supple.      Right lower le+ Edema present.      Left lower le+ Edema present.      Right foot: Normal capillary refill.      Left foot: Normal capillary refill.   Feet:      Right foot:      Skin integrity: No erythema or warmth.      Left foot:      Skin integrity: No erythema or warmth.      Comments: R 2nd digit amputation noted.  No focal tenderness.   Lymphadenopathy:      Cervical: No cervical adenopathy.   Neurological:      General: No focal deficit present.      Mental Status: She is alert.   Psychiatric:         Thought Content: Thought content normal.         Judgment: Judgment normal.            CXR (Wet read): No pleural effusions noted. Awaiting Radiologist review.      [id number]  [id number]  [id number]  [id number]^CAN^BRENDA     [facility], Echocardiography Laboratory  [address] [place] [address]  [place] [address]     Name: ADITHYA CHERY  MRN: [id number]  : 1935  Study Date: 05/15/2023 01:11 PM  Age: 88 yrs  Gender: Female  Patient Location: [facility]  Reason For Study: BEARDEN (dyspnea on exertion), Coronary artery disease involving  alejandra  Ordering Physician: BRENDA MARINO  Referring Physician: BRENDA MARINO  Performed By: Scarlett Mera RDCS     BSA: 1.7 m2  Height: 61 in  Weight: 152 lb  BP: 144/87 mmHg  ______________________________________________________________________________  Procedure  Echocardiogram with two-dimensional, color and spectral Doppler performed.  ______________________________________________________________________________  Interpretation Summary     Global and regional left ventricular function is normal with an EF of 60-65%.  Mild concentric wall thickening consistent with left ventricular hypertrophy  is present.  Grade II or moderate diastolic dysfunction.  Global right ventricular function is normal.  No significant valvular abnormalities present.  Mild pulmonary hypertension is present.  IVC diameter <2.1 cm collapsing >50% with sniff suggests a normal RA pressure  of 3 mmHg.  No pericardial effusion is present.  No significant changes noted.  ______________________________________________________________________________  Left Ventricle  Global and regional left ventricular function is normal with an EF of 60-65%.  Left ventricular size is normal. Mild concentric wall thickening consistent  with left ventricular hypertrophy is present. Grade II or moderate diastolic  dysfunction.     Right Ventricle  The right ventricle is normal size. Global right ventricular function is  normal.     Atria  Both atria appear normal.     Mitral Valve  Moderate mitral annular calcification is present. Trace mitral insufficiency  is present.     Aortic Valve  The aortic valve is tricuspid. Moderate aortic valve calcification is present.     Tricuspid Valve  The tricuspid valve is normal. The  right ventricular systolic pressure is  approximated at 35.3 mmHg plus the right atrial pressure. Mild pulmonary  hypertension is present.     Pulmonic Valve  On Doppler interrogation, there is no significant stenosis or regurgitation.     Vessels  The thoracic aorta is normal. IVC diameter <2.1 cm collapsing >50% with sniff  suggests a normal RA pressure of 3 mmHg.     Pericardium  No pericardial effusion is present.     Miscellaneous  No significant valvular abnormalities present.     Compared to Previous Study  No significant changes noted.  ______________________________________________________________________________  MMode/2D Measurements & Calculations  IVSd: 1.3 cm  LVIDd: 4.1 cm  LVIDs: 2.4 cm  LVPWd: 1.1 cm  FS: 42.2 %  LV mass(C)d: 167.5 grams  LV mass(C)dI: 99.6 grams/m2  Ao root diam: 3.5 cm  asc Aorta Diam: 3.4 cm  LVOT diam: 1.7 cm  LVOT area: 2.3 cm2  LA Volume (BP): 26.1 ml     LA Volume Index (BP): 15.5 ml/m2  RWT: 0.53  TAPSE: 1.5 cm     Doppler Measurements & Calculations  MV E max darius: 97.0 cm/sec  MV A max darius: 113.5 cm/sec  MV E/A: 0.85  MV dec slope: 411.2 cm/sec2  TR max darius: 297.3 cm/sec  TR max P.3 mmHg  E/E' av.8  Lateral E/e': 13.3  Medial E/e': 26.2     ______________________________________________________________________________  Report approved by: Win Ma 05/15/2023 02:01 PM             Signed Electronically by: MARTY Yi CNP

## 2025-04-10 ENCOUNTER — TELEPHONE (OUTPATIENT)
Dept: CARDIOLOGY | Facility: CLINIC | Age: 89
End: 2025-04-10
Payer: COMMERCIAL

## 2025-04-10 RX ORDER — ATORVASTATIN CALCIUM 40 MG/1
40 TABLET, FILM COATED ORAL DAILY
Qty: 90 TABLET | Refills: 0 | Status: SHIPPED | OUTPATIENT
Start: 2025-04-10

## 2025-04-10 NOTE — TELEPHONE ENCOUNTER
Patient Contacted for the patient to call back and schedule the following:    Appointment type:  [facility] CARDIOLOGY   Provider: CAN   Return date: 09/08/25  Specialty phone number: [phone] OPT 1   Additional appointment(s) needed: N/A   Additonal Notes: PLACED PT ON WAITLIST

## 2025-04-10 NOTE — TELEPHONE ENCOUNTER
Patient calling in reporting a lot of cramping, and having an odor to urine. She is having a little left flank pain.   No nausea/vomiting/chills/fever. No burning or frequency.   She does take Keflex for suppression.   Order for UA placed and patient will complete today.           Signed Prescriptions:                        Disp   Refills    atorvastatin (LIPITOR) 40 MG tablet        90 tab*0        Sig: TAKE 1 TABLET(40 MG) BY MOUTH DAILY  Authorizing Provider: JOSE GIRALDO

## 2025-04-11 ENCOUNTER — HOSPITAL ENCOUNTER (OUTPATIENT)
Dept: ULTRASOUND IMAGING | Facility: HOSPITAL | Age: 89
Discharge: HOME | End: 2025-04-11
Attending: EMERGENCY MEDICINE | Admitting: EMERGENCY MEDICINE
Payer: COMMERCIAL

## 2025-04-11 ENCOUNTER — TELEPHONE (OUTPATIENT)
Dept: CARDIOLOGY | Facility: CLINIC | Age: 89
End: 2025-04-11

## 2025-04-11 DIAGNOSIS — R60.0 PEDAL EDEMA: ICD-10-CM

## 2025-04-11 PROCEDURE — 93970 EXTREMITY STUDY: CPT

## 2025-04-11 NOTE — TELEPHONE ENCOUNTER
Received notification that patient is trying to get in to see Dr. Willson due to shortness of breath. Attempted to call patient. Unable to speak with patient at this time. Patient on waitlist for sooner appointment.    Hannah Condon RN, BSN  Cardiology RN Care Coordinator   [place]   Phone: [phone]  Fax: [phone] ([place]) [phone] ([place])

## 2025-04-14 ENCOUNTER — TELEPHONE (OUTPATIENT)
Dept: FAMILY MEDICINE | Facility: CLINIC | Age: 89
End: 2025-04-14
Payer: COMMERCIAL

## 2025-04-14 NOTE — TELEPHONE ENCOUNTER
Is she referring to her cardiology appointment in September,usually patients are able to schedule ECHOs sooner?

## 2025-04-14 NOTE — TELEPHONE ENCOUNTER
Attempt #1 to call patient.     RN left voicemail and requested return call to [facility] at #[phone]. When patient returns call, please review message from provider below.    Karen RUDD RN  [facility]      ----- Message from Darrel Ibarra sent at 4/11/2025  5:09 PM CDT -----  Please call patient:  Presented with BLE edema and SOBOE. Sent to ADS to rule out DVT (US negative).    Her creatinine (a measure of kidney function), is up slightly, this could be from her starting furosemide recently.     Please encourage patient to schedule ECHO and have her seen for follow-up in 2 weeks (sooner if needed).

## 2025-04-14 NOTE — TELEPHONE ENCOUNTER
Attempt #1 to call patient.     RN left voicemail and requested return call to [facility] at [phone] and ask to speak to a nurse.     When pt returns call back, please confirm with pt that appt in September is for return cardiology not for ECHO (according to appointment book in [facility]). Pt needs to schedule ECHO still, number to schedule ECHO is [phone]. Pt also still needs to schedule 2-week follow up with PCP.     Paula Segovia RN   [facility]

## 2025-04-14 NOTE — TELEPHONE ENCOUNTER
Unable to schedule Echo until September. Is this ok or change priority of order?    Patient will call back to schedule follow up in 2 weeks with PCP.    Deepa Valles RN on 4/14/2025 at 9:30 AM

## 2025-04-15 ENCOUNTER — TELEPHONE (OUTPATIENT)
Dept: FAMILY MEDICINE | Facility: CLINIC | Age: 89
End: 2025-04-15
Payer: COMMERCIAL

## 2025-04-15 NOTE — TELEPHONE ENCOUNTER
Patient Quality Outreach    Patient is due for the following:   Physical Annual Wellness Visit      Topic Date Due    Diptheria Tetanus Pertussis (DTAP/TDAP/TD) Vaccine (1 - Tdap) Never done    Zoster (Shingles) Vaccine (1 of 2) Never done    COVID-19 Vaccine (4 - 2024-25 season) 09/01/2024       Action(s) Taken:   Schedule a Annual Wellness Visit    Type of outreach:    Phone, spoke to patient/parent. Scheduled wellness visit with PCP on 6/2/25    Questions for provider review:    None         Purnima Moreno, [facility]  Chart routed to None.

## 2025-04-16 NOTE — TELEPHONE ENCOUNTER
Future Appointments 4/16/2025 - 10/13/2025        Date Visit Type Length Department Provider     5/1/2025  9:00 AM ECHO COMPLETE 60 min [facility] CARDIAC SERVICES [email or web address]              6/2/2025 10:30 AM ANNUAL WELLNESS 30 min  FAMILY PRACTICE Jocelin Guajardo MD    Location Instructions:     [facility] is located at [address], one mile north of the exit off of [address]. From [facility], turn east on [address] [place] [address] or [facility] to access the service road that connects to the parking lot. Be sure to enter the building labeled [address], as another [place] building is across [facility] from the clinic.&nbsp;               9/8/2025 11:30 AM RETURN CARDIOLOGY 30 min [facility] HEART Gil Willson MD    Location Instructions:     [facility] has 2 buildings on its campus. Please visit us at [address] [place], [place] and enter the building with the numbers [address] on it.                        Pt schedule echo and follow up with PCP as well as future appt with cards.     Thanks,  DEJAH Rucker  [facility]

## 2025-05-01 ENCOUNTER — TELEPHONE (OUTPATIENT)
Dept: FAMILY MEDICINE | Facility: CLINIC | Age: 89
End: 2025-05-01

## 2025-05-01 NOTE — TELEPHONE ENCOUNTER
Routing to Darrel Ibarra as FYI only    Patient called back and RN relayed provider's message. Patient verbalized understanding.     She said Dr. Willson (her cardiologist) called and made her appt next week to follow up with her.    She said she wanted to thank you for being so kind and being so considerate with her care.     Deepa RODRIGUEZ RN, BSN  [facility] [place]

## 2025-05-01 NOTE — TELEPHONE ENCOUNTER
Left message on patient's VM requesting a return call to [facility] [phone]     Dolores Appiah RN  [facility]      ----- Message from Darrel Ibarra sent at 5/1/2025 11:28 AM CDT -----  Please call brayan (briefly, we obtained ECHO due to SOBOE,BLE edema,and hx Pulm HTN):    ECHO without significant changes. Follow-up if symptoms worsen/fail to improve.

## 2025-05-12 ENCOUNTER — OFFICE VISIT (OUTPATIENT)
Dept: CARDIOLOGY | Facility: CLINIC | Age: 89
End: 2025-05-12
Attending: NURSE PRACTITIONER
Payer: COMMERCIAL

## 2025-05-12 VITALS
HEART RATE: 67 BPM | BODY MASS INDEX: 29.46 KG/M2 | SYSTOLIC BLOOD PRESSURE: 130 MMHG | DIASTOLIC BLOOD PRESSURE: 84 MMHG | OXYGEN SATURATION: 97 % | WEIGHT: 159 LBS

## 2025-05-12 DIAGNOSIS — R06.09 DYSPNEA ON EXERTION: Primary | ICD-10-CM

## 2025-05-12 DIAGNOSIS — R60.0 BILATERAL LOWER EXTREMITY EDEMA: ICD-10-CM

## 2025-05-12 DIAGNOSIS — I25.10 CORONARY ARTERY DISEASE INVOLVING NATIVE CORONARY ARTERY OF NATIVE HEART WITHOUT ANGINA PECTORIS: ICD-10-CM

## 2025-05-12 PROCEDURE — 3075F SYST BP GE 130 - 139MM HG: CPT | Performed by: INTERNAL MEDICINE

## 2025-05-12 PROCEDURE — 99213 OFFICE O/P EST LOW 20 MIN: CPT | Performed by: INTERNAL MEDICINE

## 2025-05-12 PROCEDURE — 3079F DIAST BP 80-89 MM HG: CPT | Performed by: INTERNAL MEDICINE

## 2025-05-12 RX ORDER — BUMETANIDE 0.5 MG/1
0.5 TABLET ORAL DAILY
Qty: 90 TABLET | Refills: 3 | Status: SHIPPED | OUTPATIENT
Start: 2025-05-12

## 2025-05-12 NOTE — LETTER
2025      RE: Brenna Watkins  [address] [id number]       Dear Colleague,    Thank you for the opportunity to participate in the care of your patient, Brenna Watkins, at the [facility]. Please see a copy of my visit note below.    Referring provider: Jocelin Guajardo MD    HPI: Ms. Brenna Watkins is a 86 year old  female with PMH significant for   -Hypertension (new diagnosis) currently not on treatment    Patient is being seen today at request of Dr. Guajardo for dyspnea on exertion which has been gradually worsening over the last few years.  Particularly over the last 1 year she is having difficulty to walk with her friends even level ground.  She feels short of breath climbing stairs. Denies chest pain, palpitations, dizziness, syncope or lower extremity edema.  She tells me that she sometimes wakes up at night with shortness of breath.  Patient was recommended lisinopril 5 mg for hypertension when she was last seen on 3/1/2021 by Dr. Guajardo.  Today she tells me that she has not received any prescription.      She had an extensive work-up for dyspnea on exertion in 2017 and 2018 with spirometry, chest CT PE and stress echocardiogram which were all were unremarkable.  Most recent lab tests including CBC, BMP and TSH were all normal.    She has no history of diabetes mellitus, hyperlipidemia, coronary artery disease or any cardiac arrhythmia.  Patient tells me that 4 of her brothers  of MI younger than 65 years old.    No prior history of cardiac disease.  Lifetime non-smoker.    Patient is currently on omeprazole but no other medications.    Stress echocardiogram in 2017 showed a structurally normal heart with no inducible ischemia.  EKG 2018 shows sinus rhythm otherwise normal significant change.    Medications, personal, family, and social history reviewed with patient and  revised.    Interval history 12/7/2021:  Patient is being seen today for follow-up.  As you know when I first saw her in April of this year she was complaining of shortness of breath with exertion. A coronary CT was pursued.  This showed diffuse CAD with possible obstructive CAD.  As such, coronary angiogram was completed 6/1/2021. This showed obstructive CAD of the RCA and LCx.  She underwent TIFFANY x1 to both areas. She was discharged the same day.   After the PCI she was seen in June of this year for follow-up.  At that time she continued to report shortness of breath.    Today patient tells me that she is having a lot of back pain and neck pain.  She has been waiting to get a spinal injection for back pain but since she had PCI we have postponed the procedure.  Continues to report dyspnea on exertion.  No change in the symptoms since PCI.  No chest pain, dizziness, syncope or lower extremity edema.    Interval history 7/13/2022:  Patient is being seen today for 6-month follow-up.  She tells me that she still has shortness of breath with exertion which occurs when she is walking or exercising.  She has to stop frequently to catch her breath.  Symptoms has not changed since he had PCI.  Patient is able to walk for half an hour, exercise with the machines and do house chores.  Denies chest pain or any other concerning cardiac symptoms at this time.  She tells me that her  has Alzheimer's and she is taking care of her .  She has recently seen pulmonary and underwent pulmonary function test.  PFT and chest imaging were normal.  Patient's shortness of breath with activity was not attributed to any lung pathology.  She had recent echocardiogram which shows normal biventricular function with no significant valve disease.  Patient tells me that she has history of bilateral carpal tunnel syndrome.    Interval history 5/17/2023:  Patient returns for follow-up.  She tells me she is suffering from sciatica pain  which limits her physical activity.  She walks with her  and tells me that she feels tired and short of breath when she is walking.  She tells me she has been sleeping on the recliner for several years.  Denies lower extremity edema, no chest pain.  Reviewed patient's recent echocardiogram 5/15/2023 which showed normal biventricular function, no significant valve disease and grade 2 moderate diastolic dysfunction.  IVC is normal.  She is currently on aspirin 81, Lipitor 40 mg and metoprolol 12.5 mg daily    Interval history 5/12/2025  Patient is being seen for follow-up.  She tells me that she is feeling more short of breath and fatigue during physical activity over the last several months.  She tells me she had foot surgery in January and cannot lie flat since then.  She feels short of breath when she lies flat.  She is able to do some exercise at home.  No chest pain, palpitations.  Reports her legs are heavy.  Recently had ultrasound of the lower extremities and showed no DVT.  Recent echocardiogram showed mild pulmonary hypertension normal biventricular function.  Patient tells me that she ran out of a Lasix a week ago.    PAST MEDICAL HISTORY:  Past Medical History:   Diagnosis Date     Brain aneurysm      Bunion      CAD (coronary artery disease)      CTS (carpal tunnel syndrome)      DDD (degenerative disc disease), cervical      DJD (degenerative joint disease) of knee      GERD without esophagitis      Hyperlipidemia LDL goal <70      Hypertension goal BP (blood pressure) < 140/90 07/05/2017       CURRENT MEDICATIONS:  Current Outpatient Medications   Medication Sig Dispense Refill     aspirin (ASA) 81 MG chewable tablet Take 1 tablet (81 mg) by mouth daily 90 tablet 3     atorvastatin (LIPITOR) 40 MG tablet TAKE 1 TABLET(40 MG) BY MOUTH DAILY 90 tablet 0     calcium carbonate-vitamin D (OSCAL) 500-5 MG-MCG tablet Take 1 tablet by mouth 2 times daily 180 tablet 3     diphenhydrAMINE-acetaminophen  (TYLENOL PM)  MG tablet Take 2 tablets by mouth nightly as needed for sleep       furosemide (LASIX) 20 MG tablet Take 1 tablet (20 mg) by mouth daily as needed (leg swelling). 7 tablet 0     metoprolol succinate ER (TOPROL XL) 25 MG 24 hr tablet Take 0.5 tablets (12.5 mg) by mouth daily 45 tablet 3     multivitamin w/minerals (THERA-VIT-M) tablet Take 1 tablet by mouth daily       Omeprazole (PRILOSEC PO) Take 20 mg by mouth daily         PAST SURGICAL HISTORY:  Past Surgical History:   Procedure Laterality Date     APPENDECTOMY       AS REVISE ULNAR NERVE AT ELBOW  2018     BACK SURGERY  2015     CARPAL TUNNEL RELEASE RT/LT Right      CRANIOTOMY, REPAIR ANEURYSM, COMBINED       CV CORONARY ANGIOGRAM N/A 6/1/2021    Procedure: CV CORONARY ANGIOGRAM;  Surgeon: Syed Zayas MD;  Location: [facility] CARDIAC CATH LAB     CV PCI STENT DRUG ELUTING N/A 6/1/2021    Procedure: Percutaneous Coronary Intervention Stent Drug Eluting;  Surgeon: Syed Zayas MD;  Location: [facility] CARDIAC CATH LAB     IR CAROTID ANGIOGRAM  1/26/2012     IR CAROTID ANGIOGRAM  1/26/2012     IR MISCELLANEOUS PROCEDURE  1/26/2012     STRIP VEIN       TOE SURGERY  03/2018       ALLERGIES:     Allergies   Allergen Reactions     Alendronate      Leg spasms     Pcn [Penicillins]        FAMILY HISTORY:  Family History   Problem Relation Age of Onset     Sudden Death Mother         d. childbirth      Alcoholism Father 62     Myocardial Infarction Brother 68     Alzheimer Disease Sister      Diabetes No family hx of      Cancer No family hx of          SOCIAL HISTORY:  Social History     Tobacco Use     Smoking status: Never     Passive exposure: Never     Smokeless tobacco: Never   Vaping Use     Vaping status: Never Used   Substance Use Topics     Alcohol use: Yes     Alcohol/week: 1.0 - 2.0 standard drink of alcohol     Types: 1 - 2 Glasses of wine per week     Drug use: No       ROS:   Constitutional: No  fever, chills, or sweats. Weight stable.   Cardiovascular: As per HPI.       Exam:  /84 (BP Location: Right arm, Patient Position: Chair, Cuff Size: Adult Regular)   Pulse 67   Wt 72.1 kg (159 lb)   SpO2 97%   BMI 29.46 kg/m    GENERAL APPEARANCE: alert and no distress  HEENT: no icterus, no central cyanosis  LYMPH/NECK: no adenopathy, no asymmetry, JVP not elevated.  RESPIRATORY: lungs clear to auscultation - no rales, rhonchi or wheezes, no use of accessory muscles, no retractions, respirations are unlabored, normal respiratory rate  CARDIOVASCULAR: regular rhythm, normal S1, S2, no S3 or S4 and no murmur, click or rub, precordium quiet with normal PMI.  EXTREMITIES: Trace ankle edema both sides.  NEURO: alert, normal speech,and affect  SKIN: no ecchymoses, no rashes     I have reviewed the labs and personally reviewed the imaging below and made my comment in the assessment and plan.    Labs:  CBC RESULTS:   Lab Results   Component Value Date    WBC 6.4 04/11/2025    WBC 7.8 06/01/2021    RBC 4.62 04/11/2025    RBC 4.62 06/01/2021    HGB 13.6 04/11/2025    HGB 14.0 06/01/2021    HCT 43.6 04/11/2025    HCT 44.0 06/01/2021    MCV 94 04/11/2025    MCV 95 06/01/2021    MCH 29.4 04/11/2025    MCH 30.3 06/01/2021    MCHC 31.2 (L) 04/11/2025    MCHC 31.8 06/01/2021    RDW 13.9 04/11/2025    RDW 14.2 06/01/2021     04/11/2025     06/01/2021       BMP RESULTS:  Lab Results   Component Value Date     04/11/2025     06/01/2021    POTASSIUM 4.1 04/11/2025    POTASSIUM 4.4 05/03/2022    POTASSIUM 4.2 06/01/2021    CHLORIDE 101 04/11/2025    CHLORIDE 108 05/03/2022    CHLORIDE 106 06/01/2021    CO2 28 04/11/2025    CO2 25 05/03/2022    CO2 29 06/01/2021    ANIONGAP 12 04/11/2025    ANIONGAP 9 05/03/2022    ANIONGAP 3 06/01/2021    GLC 98 04/11/2025    GLC 94 05/03/2022    GLC 88 06/01/2021    BUN 23.4 (H) 04/11/2025    BUN 15 05/03/2022    BUN 21 06/01/2021    CR 0.98 (H) 04/11/2025    CR  0.92 06/01/2021    GFRESTIMATED 55 (L) 04/11/2025    GFRESTIMATED 57 (L) 06/01/2021    GFRESTBLACK 66 06/01/2021    BAUTISTA 10.4 04/11/2025    BAUTISTA 9.2 06/01/2021     Transthoracic echocardiogram 5/15/2023  Global and regional left ventricular function is normal with an EF of 60-65%.  Mild concentric wall thickening consistent with left ventricular hypertrophy  is present.  Grade II or moderate diastolic dysfunction.  Global right ventricular function is normal.  No significant valvular abnormalities present.  Mild pulmonary hypertension is present.  IVC diameter <2.1 cm collapsing >50% with sniff suggests a normal RA pressure  of 3 mmHg.  No pericardial effusion is present.  No significant changes noted.    Echocardiogram 6/30/2022  The left ventricle is normal in size with borderline concentric left  ventricular hypertrophy.  Left ventricular function is normal.The ejection fraction is 60-65%.  Normal right ventricle size and systolic function.  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  Compared to the prior study dated 5/10/21, there have been no changes.    Coronary angiogram 6/1/2021  2 vessel severe CAD involving the RCA and LCx. Otherwise mild non obstructive CAD with severely tortuous coronaries.  PCI with two drug eluting stents (LCx and RCA).    CT coronary angiogram 5/12/2021  1.  Diffuse coronary atherosclerosis, possibly with 3 vessel  obstructive CAD. Recommend invasive evaluation.  2.  Total Agatston score 249 placing the patient in the 59 percentile  when compared to age and gender matched control group.    Echocardiogram 5/10/2021  Global and regional left ventricular function is normal with an EF of 60-65%.  Right ventricular function, chamber size, wall motion, and thickness are  normal.  Both atria appear normal.  Pulmonary artery systolic pressure is normal.  The inferior vena cava is normal.  No pericardial effusion is present.    TTE  5/1/2025  Left ventricular size, wall motion and function are normal. The ejection  fraction is 55-60%.     Grade I or early diastolic dysfunction. Diastolic Doppler findings (E/E' ratio  and/or other parameters) suggest left ventricular filling pressures are  increased.     The right ventricle is normal size. Global right ventricular function is  normal.     Mild (pulmonary artery systolic pressure<50mmHg) pulmonary hypertension is  present. The right ventricular systolic pressure is 43mmHg above the right  atrial pressure.     Mild mitral stenosis is present.     The inferior vena cava is normal.     No pericardial effusion is present.     Compared to 5/15/2023 there are no hemodynamically significant difference.  Assessment and Plan:     #CAD S/P PCI with two drug eluting stents (LCx and RCA) on 6/1/2021  -Continues to report BEARDEN with physical activity which is unchanged since PCI.  Patient is also describing orthopnea.  Not significantly hypervolemic on my exam today.  Little bit ankle edema on both sides. Patient tells me that her feet /legs feel heavy.  She ran out of Lasix but her symptoms were the same when she was on Lasix.  Recent echocardiogram showed mild pulm hypertension.    I am suspecting she might have HFpEF symptoms.  Echocardiogram on 5/1/2025 showed normal IVC, normal biventricular function, mild pulm hypertension.     Plan:  -Switch to Bumex 0.5 mg daily  -BMP in 2 weeks  -Stress echocardiogram  -Stop metoprolol    Return to clinic in few months to recheck.    Total time spent today for this visit is 20 minutes including precharting, face-to-face clinic visit, review of labs/imaging and medical documentation.    Gil MARINO MD  [facility] Division of Cardiology  Pager [phone]              Please do not hesitate to contact me if you have any questions/concerns.     Sincerely,     Gil Marino MD

## 2025-05-12 NOTE — NURSING NOTE
Cardiac Testing: Patient given instructions regarding  stress echocardiogram . Discussed purpose, preparation, procedure and when to expect results reported back to the patient. Patient demonstrated understanding of this information and agreed to call with further questions or concerns. Patient to have exercise stress echocardiogram.    New Medication: Patient was educated regarding newly prescribed medication, including discussion of  the indication, administration, side effects, and when to report to MD or RN. Patient demonstrated understanding of this information and agreed to call with further questions or concerns. Patient to start bumex 0.5 mg daily.    Return Appointment: Patient given instructions regarding scheduling next clinic visit. Patient demonstrated understanding of this information and agreed to call with further questions or concerns. Patient to follow up next available. Patient to have labs in 2 weeks.    Medication Change: Patient was educated regarding prescribed medication change, including discussion of the indication, administration, side effects, and when to report to MD or RN. Patient demonstrated understanding of this information and agreed to call with further questions or concerns. Patient to stop metoprolol.    Patient stated she understood all health information given and agreed to call with further questions or concerns.     Hannah Condon RN, BSN  Cardiology RN Care Coordinator   [place]   Phone: [phone]  Fax: [phone] ([place]) [phone] ([place])

## 2025-05-12 NOTE — PROGRESS NOTES
Referring provider: Jocelin Guajardo MD    HPI: Ms. Brenna Watkins is a 86 year old  female with PMH significant for   -Hypertension (new diagnosis) currently not on treatment    Patient is being seen today at request of Dr. Guajardo for dyspnea on exertion which has been gradually worsening over the last few years.  Particularly over the last 1 year she is having difficulty to walk with her friends even level ground.  She feels short of breath climbing stairs. Denies chest pain, palpitations, dizziness, syncope or lower extremity edema.  She tells me that she sometimes wakes up at night with shortness of breath.  Patient was recommended lisinopril 5 mg for hypertension when she was last seen on 3/1/2021 by Dr. Guajardo.  Today she tells me that she has not received any prescription.      She had an extensive work-up for dyspnea on exertion in 2017 and 2018 with spirometry, chest CT PE and stress echocardiogram which were all were unremarkable.  Most recent lab tests including CBC, BMP and TSH were all normal.    She has no history of diabetes mellitus, hyperlipidemia, coronary artery disease or any cardiac arrhythmia.  Patient tells me that 4 of her brothers  of MI younger than 65 years old.    No prior history of cardiac disease.  Lifetime non-smoker.    Patient is currently on omeprazole but no other medications.    Stress echocardiogram in 2017 showed a structurally normal heart with no inducible ischemia.  EKG 2018 shows sinus rhythm otherwise normal significant change.    Medications, personal, family, and social history reviewed with patient and revised.    Interval history 2021:  Patient is being seen today for follow-up.  As you know when I first saw her in April of this year she was complaining of shortness of breath with exertion. A coronary CT was pursued.  This showed diffuse CAD with possible obstructive CAD.  As such, coronary angiogram was completed 2021. This showed obstructive  CAD of the RCA and LCx.  She underwent TIFFANY x1 to both areas. She was discharged the same day.   After the PCI she was seen in June of this year for follow-up.  At that time she continued to report shortness of breath.    Today patient tells me that she is having a lot of back pain and neck pain.  She has been waiting to get a spinal injection for back pain but since she had PCI we have postponed the procedure.  Continues to report dyspnea on exertion.  No change in the symptoms since PCI.  No chest pain, dizziness, syncope or lower extremity edema.    Interval history 7/13/2022:  Patient is being seen today for 6-month follow-up.  She tells me that she still has shortness of breath with exertion which occurs when she is walking or exercising.  She has to stop frequently to catch her breath.  Symptoms has not changed since he had PCI.  Patient is able to walk for half an hour, exercise with the machines and do house chores.  Denies chest pain or any other concerning cardiac symptoms at this time.  She tells me that her  has Alzheimer's and she is taking care of her .  She has recently seen pulmonary and underwent pulmonary function test.  PFT and chest imaging were normal.  Patient's shortness of breath with activity was not attributed to any lung pathology.  She had recent echocardiogram which shows normal biventricular function with no significant valve disease.  Patient tells me that she has history of bilateral carpal tunnel syndrome.    Interval history 5/17/2023:  Patient returns for follow-up.  She tells me she is suffering from sciatica pain which limits her physical activity.  She walks with her  and tells me that she feels tired and short of breath when she is walking.  She tells me she has been sleeping on the recliner for several years.  Denies lower extremity edema, no chest pain.  Reviewed patient's recent echocardiogram 5/15/2023 which showed normal biventricular function, no  significant valve disease and grade 2 moderate diastolic dysfunction.  IVC is normal.  She is currently on aspirin 81, Lipitor 40 mg and metoprolol 12.5 mg daily    Interval history 5/12/2025  Patient is being seen for follow-up.  She tells me that she is feeling more short of breath and fatigue during physical activity over the last several months.  She tells me she had foot surgery in January and cannot lie flat since then.  She feels short of breath when she lies flat.  She is able to do some exercise at home.  No chest pain, palpitations.  Reports her legs are heavy.  Recently had ultrasound of the lower extremities and showed no DVT.  Recent echocardiogram showed mild pulmonary hypertension normal biventricular function.  Patient tells me that she ran out of a Lasix a week ago.    PAST MEDICAL HISTORY:  Past Medical History:   Diagnosis Date    Brain aneurysm     Bunion     CAD (coronary artery disease)     CTS (carpal tunnel syndrome)     DDD (degenerative disc disease), cervical     DJD (degenerative joint disease) of knee     GERD without esophagitis     Hyperlipidemia LDL goal <70     Hypertension goal BP (blood pressure) < 140/90 07/05/2017       CURRENT MEDICATIONS:  Current Outpatient Medications   Medication Sig Dispense Refill    aspirin (ASA) 81 MG chewable tablet Take 1 tablet (81 mg) by mouth daily 90 tablet 3    atorvastatin (LIPITOR) 40 MG tablet TAKE 1 TABLET(40 MG) BY MOUTH DAILY 90 tablet 0    calcium carbonate-vitamin D (OSCAL) 500-5 MG-MCG tablet Take 1 tablet by mouth 2 times daily 180 tablet 3    diphenhydrAMINE-acetaminophen (TYLENOL PM)  MG tablet Take 2 tablets by mouth nightly as needed for sleep      furosemide (LASIX) 20 MG tablet Take 1 tablet (20 mg) by mouth daily as needed (leg swelling). 7 tablet 0    metoprolol succinate ER (TOPROL XL) 25 MG 24 hr tablet Take 0.5 tablets (12.5 mg) by mouth daily 45 tablet 3    multivitamin w/minerals (THERA-VIT-M) tablet Take 1 tablet by  mouth daily      Omeprazole (PRILOSEC PO) Take 20 mg by mouth daily         PAST SURGICAL HISTORY:  Past Surgical History:   Procedure Laterality Date    APPENDECTOMY      AS REVISE ULNAR NERVE AT ELBOW  2018    BACK SURGERY  2015    CARPAL TUNNEL RELEASE RT/LT Right     CRANIOTOMY, REPAIR ANEURYSM, COMBINED      CV CORONARY ANGIOGRAM N/A 6/1/2021    Procedure: CV CORONARY ANGIOGRAM;  Surgeon: Syed Zayas MD;  Location:  [facility]    CV PCI STENT DRUG ELUTING N/A 6/1/2021    Procedure: Percutaneous Coronary Intervention Stent Drug Eluting;  Surgeon: Syed Zayas MD;  Location:  [facility]    IR CAROTID ANGIOGRAM  1/26/2012    IR CAROTID ANGIOGRAM  1/26/2012    IR MISCELLANEOUS PROCEDURE  1/26/2012    STRIP VEIN      TOE SURGERY  03/2018       ALLERGIES:     Allergies   Allergen Reactions    Alendronate      Leg spasms    Pcn [Penicillins]        FAMILY HISTORY:  Family History   Problem Relation Age of Onset    Sudden Death Mother         d. childbirth     Alcoholism Father 62    Myocardial Infarction Brother 68    Alzheimer Disease Sister     Diabetes No family hx of     Cancer No family hx of          SOCIAL HISTORY:  Social History     Tobacco Use    Smoking status: Never     Passive exposure: Never    Smokeless tobacco: Never   Vaping Use    Vaping status: Never Used   Substance Use Topics    Alcohol use: Yes     Alcohol/week: 1.0 - 2.0 standard drink of alcohol     Types: 1 - 2 Glasses of wine per week    Drug use: No       ROS:   Constitutional: No fever, chills, or sweats. Weight stable.   Cardiovascular: As per HPI.       Exam:  /84 (BP Location: Right arm, Patient Position: Chair, Cuff Size: Adult Regular)   Pulse 67   Wt 72.1 kg (159 lb)   SpO2 97%   BMI 29.46 kg/m    GENERAL APPEARANCE: alert and no distress  HEENT: no icterus, no central cyanosis  LYMPH/NECK: no adenopathy, no asymmetry, JVP not elevated.  RESPIRATORY: lungs  clear to auscultation - no rales, rhonchi or wheezes, no use of accessory muscles, no retractions, respirations are unlabored, normal respiratory rate  CARDIOVASCULAR: regular rhythm, normal S1, S2, no S3 or S4 and no murmur, click or rub, precordium quiet with normal PMI.  EXTREMITIES: Trace ankle edema both sides.  NEURO: alert, normal speech,and affect  SKIN: no ecchymoses, no rashes     I have reviewed the labs and personally reviewed the imaging below and made my comment in the assessment and plan.    Labs:  CBC RESULTS:   Lab Results   Component Value Date    WBC 6.4 04/11/2025    WBC 7.8 06/01/2021    RBC 4.62 04/11/2025    RBC 4.62 06/01/2021    HGB 13.6 04/11/2025    HGB 14.0 06/01/2021    HCT 43.6 04/11/2025    HCT 44.0 06/01/2021    MCV 94 04/11/2025    MCV 95 06/01/2021    MCH 29.4 04/11/2025    MCH 30.3 06/01/2021    MCHC 31.2 (L) 04/11/2025    MCHC 31.8 06/01/2021    RDW 13.9 04/11/2025    RDW 14.2 06/01/2021     04/11/2025     06/01/2021       BMP RESULTS:  Lab Results   Component Value Date     04/11/2025     06/01/2021    POTASSIUM 4.1 04/11/2025    POTASSIUM 4.4 05/03/2022    POTASSIUM 4.2 06/01/2021    CHLORIDE 101 04/11/2025    CHLORIDE 108 05/03/2022    CHLORIDE 106 06/01/2021    CO2 28 04/11/2025    CO2 25 05/03/2022    CO2 29 06/01/2021    ANIONGAP 12 04/11/2025    ANIONGAP 9 05/03/2022    ANIONGAP 3 06/01/2021    GLC 98 04/11/2025    GLC 94 05/03/2022    GLC 88 06/01/2021    BUN 23.4 (H) 04/11/2025    BUN 15 05/03/2022    BUN 21 06/01/2021    CR 0.98 (H) 04/11/2025    CR 0.92 06/01/2021    GFRESTIMATED 55 (L) 04/11/2025    GFRESTIMATED 57 (L) 06/01/2021    GFRESTBLACK 66 06/01/2021    BAUTISTA 10.4 04/11/2025    BAUTISTA 9.2 06/01/2021     Transthoracic echocardiogram 5/15/2023  Global and regional left ventricular function is normal with an EF of 60-65%.  Mild concentric wall thickening consistent with left ventricular hypertrophy  is present.  Grade II or moderate diastolic  dysfunction.  Global right ventricular function is normal.  No significant valvular abnormalities present.  Mild pulmonary hypertension is present.  IVC diameter <2.1 cm collapsing >50% with sniff suggests a normal RA pressure  of 3 mmHg.  No pericardial effusion is present.  No significant changes noted.    Echocardiogram 6/30/2022  The left ventricle is normal in size with borderline concentric left  ventricular hypertrophy.  Left ventricular function is normal.The ejection fraction is 60-65%.  Normal right ventricle size and systolic function.  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  Compared to the prior study dated 5/10/21, there have been no changes.    Coronary angiogram 6/1/2021  2 vessel severe CAD involving the RCA and LCx. Otherwise mild non obstructive CAD with severely tortuous coronaries.  PCI with two drug eluting stents (LCx and RCA).    CT coronary angiogram 5/12/2021  1.  Diffuse coronary atherosclerosis, possibly with 3 vessel  obstructive CAD. Recommend invasive evaluation.  2.  Total Agatston score 249 placing the patient in the 59 percentile  when compared to age and gender matched control group.    Echocardiogram 5/10/2021  Global and regional left ventricular function is normal with an EF of 60-65%.  Right ventricular function, chamber size, wall motion, and thickness are  normal.  Both atria appear normal.  Pulmonary artery systolic pressure is normal.  The inferior vena cava is normal.  No pericardial effusion is present.    TTE 5/1/2025  Left ventricular size, wall motion and function are normal. The ejection  fraction is 55-60%.     Grade I or early diastolic dysfunction. Diastolic Doppler findings (E/E' ratio  and/or other parameters) suggest left ventricular filling pressures are  increased.     The right ventricle is normal size. Global right ventricular function is  normal.     Mild (pulmonary artery systolic  pressure<50mmHg) pulmonary hypertension is  present. The right ventricular systolic pressure is 43mmHg above the right  atrial pressure.     Mild mitral stenosis is present.     The inferior vena cava is normal.     No pericardial effusion is present.     Compared to 5/15/2023 there are no hemodynamically significant difference.  Assessment and Plan:     #CAD S/P PCI with two drug eluting stents (LCx and RCA) on 6/1/2021  -Continues to report BEARD[place] with physical activity which is unchanged since PCI.  Patient is also describing orthopnea.  Not significantly hypervolemic on my exam today.  Little bit ankle edema on both sides. Patient tells me that her feet /legs feel heavy.  She ran out of Lasix but her symptoms were the same when she was on Lasix.  Recent echocardiogram showed mild pulm hypertension.    I am suspecting she might have HFpEF symptoms.  Echocardiogram on 5/1/2025 showed normal IVC, normal biventricular function, mild pulm hypertension.     Plan:  -Switch to Bumex 0.5 mg daily  -BMP in 2 weeks  -Stress echocardiogram  -Stop metoprolol    Return to clinic in few months to recheck.    Total time spent today for this visit is 20 minutes including precharting, face-to-face clinic visit, review of labs/imaging and medical documentation.    Gil MARINO MD  [facility] Division of Cardiology  Pager [phone]

## 2025-05-12 NOTE — NURSING NOTE
"Chief Complaint   Patient presents with    RECHECK    Coronary Artery Disease       Initial /84 (BP Location: Right arm, Patient Position: Chair, Cuff Size: Adult Regular)   Pulse 67   Wt 72.1 kg (159 lb)   SpO2 97%   BMI 29.46 kg/m   Estimated body mass index is 29.46 kg/m  as calculated from the following:    Height as of 4/11/25: 1.565 m (5' 1.6\").    Weight as of this encounter: 72.1 kg (159 lb)..  BP completed using cuff size: regular    Neli Mae, Visit Facilitator    "

## 2025-05-12 NOTE — PATIENT INSTRUCTIONS
Thank you for coming to the [facility]; please note the following instructions:    1. Exercise stress Echo    2. BMP labs in 2 weeks    3. Stop: Metoprolol    4. Start: bumetanide (BUMEX) 0.5 MG tablet. Take 1 tablet (0.5 mg) by mouth daily.     5. Follow up next available for results and medication follow up        If you have any questions regarding your visit please contact your care team:     Cardiology  Telephone Number   Shelley WILSON., RN  Hannah WILLIAM, RN  Emilie TAVAREZ, RN  Helena LARA, RMDANIS RUBIO, RENE CHACON, Clinic Facilitator  Neli WILLIAM, Clinic Facilitator [phone] (option 1)   For scheduling appts:     [phone] (select option 1)       For the Device Clinic (Pacemakers and ICD's)  RN's :  Stella Campbell   During business hours: [phone]    *After business hours:  [phone] (select option 4)      Normal test result notifications will be released via [employer] or mailed within 7 business days.  All other test results, will be communicated via telephone once reviewed by your cardiologist.    If you need a medication refill please contact your pharmacy.  Please allow 3 business days for your refill to be completed.    As always, thank you for trusting us with your health care needs!

## 2025-05-14 ENCOUNTER — TRANSFERRED RECORDS (OUTPATIENT)
Dept: HEALTH INFORMATION MANAGEMENT | Facility: CLINIC | Age: 89
End: 2025-05-14
Payer: COMMERCIAL

## 2025-06-02 ENCOUNTER — OFFICE VISIT (OUTPATIENT)
Dept: FAMILY MEDICINE | Facility: CLINIC | Age: 89
End: 2025-06-02
Payer: COMMERCIAL

## 2025-06-02 VITALS
TEMPERATURE: 97.9 F | WEIGHT: 156 LBS | OXYGEN SATURATION: 95 % | HEART RATE: 74 BPM | BODY MASS INDEX: 28.71 KG/M2 | DIASTOLIC BLOOD PRESSURE: 83 MMHG | SYSTOLIC BLOOD PRESSURE: 167 MMHG | RESPIRATION RATE: 20 BRPM | HEIGHT: 62 IN

## 2025-06-02 DIAGNOSIS — Z00.00 ENCOUNTER FOR MEDICARE ANNUAL WELLNESS EXAM: Primary | ICD-10-CM

## 2025-06-02 DIAGNOSIS — I25.118 CORONARY ARTERY DISEASE OF NATIVE ARTERY OF NATIVE HEART WITH STABLE ANGINA PECTORIS: ICD-10-CM

## 2025-06-02 DIAGNOSIS — M85.80 LOW BONE MASS: ICD-10-CM

## 2025-06-02 DIAGNOSIS — I10 HYPERTENSION GOAL BP (BLOOD PRESSURE) < 140/90: ICD-10-CM

## 2025-06-02 DIAGNOSIS — E78.5 HYPERLIPIDEMIA LDL GOAL <70: ICD-10-CM

## 2025-06-02 DIAGNOSIS — Z23 NEED FOR VACCINATION: ICD-10-CM

## 2025-06-02 PROCEDURE — G2211 COMPLEX E/M VISIT ADD ON: HCPCS | Performed by: FAMILY MEDICINE

## 2025-06-02 PROCEDURE — 3079F DIAST BP 80-89 MM HG: CPT | Performed by: FAMILY MEDICINE

## 2025-06-02 PROCEDURE — 99214 OFFICE O/P EST MOD 30 MIN: CPT | Mod: 25 | Performed by: FAMILY MEDICINE

## 2025-06-02 PROCEDURE — 3077F SYST BP >= 140 MM HG: CPT | Performed by: FAMILY MEDICINE

## 2025-06-02 PROCEDURE — G0439 PPPS, SUBSEQ VISIT: HCPCS | Performed by: FAMILY MEDICINE

## 2025-06-02 RX ORDER — LISINOPRIL 10 MG/1
10 TABLET ORAL DAILY
Qty: 90 TABLET | Refills: 4 | Status: SHIPPED | OUTPATIENT
Start: 2025-06-02

## 2025-06-02 RX ORDER — ATORVASTATIN CALCIUM 40 MG/1
40 TABLET, FILM COATED ORAL DAILY
Qty: 90 TABLET | Refills: 4 | Status: SHIPPED | OUTPATIENT
Start: 2025-06-02

## 2025-06-02 RX ORDER — ASPIRIN 81 MG/1
81 TABLET, CHEWABLE ORAL DAILY
Qty: 90 TABLET | Refills: 4 | Status: SHIPPED | OUTPATIENT
Start: 2025-06-02

## 2025-06-02 SDOH — HEALTH STABILITY: PHYSICAL HEALTH: ON AVERAGE, HOW MANY DAYS PER WEEK DO YOU ENGAGE IN MODERATE TO STRENUOUS EXERCISE (LIKE A BRISK WALK)?: 5 DAYS

## 2025-06-02 ASSESSMENT — SOCIAL DETERMINANTS OF HEALTH (SDOH): HOW OFTEN DO YOU GET TOGETHER WITH FRIENDS OR RELATIVES?: TWICE A WEEK

## 2025-06-02 NOTE — Clinical Note
FYI - Patient is worried she cannot walk on a treadmill, so previously ordered stress test ordered as dobutamine echocardiogram instead

## 2025-06-02 NOTE — PATIENT INSTRUCTIONS
Patient Education   Preventive Care Advice   This is general advice given by our system to help you stay healthy. However, your care team may have specific advice just for you. Please talk to your care team about your preventive care needs.  Nutrition  Eat 5 or more servings of fruits and vegetables each day.  Try wheat bread, brown rice and whole grain pasta (instead of white bread, rice, and pasta).  Get enough calcium and vitamin D. Check the label on foods and aim for 100% of the RDA (recommended daily allowance).  Lifestyle  Exercise at least 150 minutes each week  (30 minutes a day, 5 days a week).  Do muscle strengthening activities 2 days a week. These help control your weight and prevent disease.  No smoking.  Wear sunscreen to prevent skin cancer.  Have a dental exam and cleaning every 6 months.  Yearly exams  See your health care team every year to talk about:  Any changes in your health.  Any medicines your care team has prescribed.  Preventive care, family planning, and ways to prevent chronic diseases.  Shots (vaccines)   HPV shots (up to age 26), if you've never had them before.  Hepatitis B shots (up to age 59), if you've never had them before.  COVID-19 shot: Get this shot when it's due.  Flu shot: Get a flu shot every year.  Tetanus shot: Get a tetanus shot every 10 years.  Pneumococcal, hepatitis A, and RSV shots: Ask your care team if you need these based on your risk.  Shingles shot (for age 50 and up)  General health tests  Diabetes screening:  Starting at age 35, Get screened for diabetes at least every 3 years.  If you are younger than age 35, ask your care team if you should be screened for diabetes.  Cholesterol test: At age 39, start having a cholesterol test every 5 years, or more often if advised.  Bone density scan (DEXA): At age 50, ask your care team if you should have this scan for osteoporosis (brittle bones).  Hepatitis C: Get tested at least once in your life.  STIs (sexually  transmitted infections)  Before age 24: Ask your care team if you should be screened for STIs.  After age 24: Get screened for STIs if you're at risk. You are at risk for STIs (including HIV) if:  You are sexually active with more than one person.  You don't use condoms every time.  You or a partner was diagnosed with a sexually transmitted infection.  If you are at risk for HIV, ask about PrEP medicine to prevent HIV.  Get tested for HIV at least once in your life, whether you are at risk for HIV or not.  Cancer screening tests  Cervical cancer screening: If you have a cervix, begin getting regular cervical cancer screening tests starting at age 21.  Breast cancer scan (mammogram): If you've ever had breasts, begin having regular mammograms starting at age 40. This is a scan to check for breast cancer.  Colon cancer screening: It is important to start screening for colon cancer at age 45.  Have a colonoscopy test every 10 years (or more often if you're at risk) Or, ask your provider about stool tests like a FIT test every year or Cologuard test every 3 years.  To learn more about your testing options, visit:   .  For help making a decision, visit:   https://bit.ly/mv31405.  Prostate cancer screening test: If you have a prostate, ask your care team if a prostate cancer screening test (PSA) at age 55 is right for you.  Lung cancer screening: If you are a current or former smoker ages 50 to 80, ask your care team if ongoing lung cancer screenings are right for you.  For informational purposes only. Not to replace the advice of your health care provider. Copyright   2023 [facility]. All rights reserved. Clinically reviewed by the [facility]. [employer] 814261 - REV 01/24.  Preventing Falls: Care Instructions  Injuries and health problems such as trouble walking or poor eyesight can increase your risk of falling. So can some medicines. But there are things you can do to help  "prevent falls. You can exercise to get stronger. You can also arrange your home to make it safer.    Talk to your doctor about the medicines you take. Ask if any of them increase the risk of falls and whether they can be changed or stopped.   Try to exercise regularly. It can help improve your strength and balance. This can help lower your risk of falling.         Practice fall safety and prevention.   Wear low-heeled shoes that fit well and give your feet good support. Talk to your doctor if you have foot problems that make this hard.  Carry a cellphone or wear a medical alert device that you can use to call for help.  Use stepladders instead of chairs to reach high objects. Don't climb if you're at risk for falls. Ask for help, if needed.  Wear the correct eyeglasses, if you need them.        Make your home safer.   Remove rugs, cords, clutter, and furniture from walkways.  Keep your house well lit. Use night-lights in hallways and bathrooms.  Install and use sturdy handrails on stairways.  Wear nonskid footwear, even inside. Don't walk barefoot or in socks without shoes.        Be safe outside.   Use handrails, curb cuts, and ramps whenever possible.  Keep your hands free by using a shoulder bag or backpack.  Try to walk in well-lit areas. Watch out for uneven ground, changes in pavement, and debris.  Be careful in the winter. Walk on the grass or gravel when sidewalks are slippery. Use de-icer on steps and walkways. Add non-slip devices to shoes.    Put grab bars and nonskid mats in your shower or tub and near the toilet. Try to use a shower chair or bath bench when bathing.   Get into a tub or shower by putting in your weaker leg first. Get out with your strong side first. Have a phone or medical alert device in the bathroom with you.   Where can you learn more?  Go to https://www.[employer].net/patiented  Enter G117 in the search box to learn more about \"Preventing Falls: Care Instructions.\"  Current as of: " July 31, 2024  Content Version: 14.4    8677-7836 [employer].   Care instructions adapted under license by your healthcare professional. If you have questions about a medical condition or this instruction, always ask your healthcare professional. [employer] disclaims any warranty or liability for your use of this information.    Learning About Sleeping Well  What does sleeping well mean?     Sleeping well means getting enough sleep to feel good and stay healthy. How much sleep is enough varies among people.  The number of hours you sleep and how you feel when you wake up are both important. If you do not feel refreshed, you probably need more sleep. Another sign of not getting enough sleep is feeling tired during the day.  Experts recommend that adults get at least 7 or more hours of sleep per day. Children and older adults need more sleep.  Why is getting enough sleep important?  Getting enough quality sleep is a basic part of good health. When your sleep suffers, your physical health, mood, and your thoughts can suffer too. You may find yourself feeling more grumpy or stressed. Not getting enough sleep also can lead to serious problems, including injury, accidents, anxiety, and depression.  What might cause poor sleeping?  Many things can cause sleep problems, including:  Changes to your sleep schedule.  Stress. Stress can be caused by fear about a single event, such as giving a speech. Or you may have ongoing stress, such as worry about work or school.  Depression, anxiety, and other mental or emotional conditions.  Changes in your sleep habits or surroundings. This includes changes that happen where you sleep, such as noise, light, or sleeping in a different bed. It also includes changes in your sleep pattern, such as having jet lag or working a late shift.  Health problems, such as pain, breathing problems, and restless legs syndrome.  Lack of regular exercise.  Using alcohol, nicotine,  "or caffeine before bed.  How can you help yourself?  Here are some tips that may help you sleep more soundly and wake up feeling more refreshed.  Your sleeping area   Use your bedroom only for sleeping and sex. A bit of light reading may help you fall asleep. But if it doesn't, do your reading elsewhere in the house. Try not to use your TV, computer, smartphone, or tablet while you are in bed.  Be sure your bed is big enough to stretch out comfortably, especially if you have a sleep partner.  Keep your bedroom quiet, dark, and cool. Use curtains, blinds, or a sleep mask to block out light. To block out noise, use earplugs, soothing music, or a \"white noise\" machine.  Your evening and bedtime routine   Create a relaxing bedtime routine. You might want to take a warm shower or bath, or listen to soothing music.  Go to bed at the same time every night. And get up at the same time every morning, even if you feel tired.  What to avoid   Limit caffeine (coffee, tea, caffeinated sodas) during the day, and don't have any for at least 6 hours before bedtime.  Avoid drinking alcohol before bedtime. Alcohol can cause you to wake up more often during the night.  Try not to smoke or use tobacco, especially in the evening. Nicotine can keep you awake.  Limit naps during the day, especially close to bedtime.  Avoid lying in bed awake for too long. If you can't fall asleep or if you wake up in the middle of the night and can't get back to sleep within about 20 minutes, get out of bed and go to another room until you feel sleepy.  Avoid taking medicine right before bed that may keep you awake or make you feel hyper or energized. Your doctor can tell you if your medicine may do this and if you can take it earlier in the day.  If you can't sleep   Imagine yourself in a peaceful, pleasant scene. Focus on the details and feelings of being in a place that is relaxing.  Get up and do a quiet or boring activity until you feel " "sleepy.  Avoid drinking any liquids before going to bed to help prevent waking up often to use the bathroom.  Where can you learn more?  Go to https://www.[employer].net/patiented  Enter J942 in the search box to learn more about \"Learning About Sleeping Well.\"  Current as of: July 31, 2024  Content Version: 14.4    7548-4491 [employer].   Care instructions adapted under license by your healthcare professional. If you have questions about a medical condition or this instruction, always ask your healthcare professional. [employer] disclaims any warranty or liability for your use of this information.    Bladder Training: Care Instructions  Your Care Instructions     Bladder training is used to treat urge incontinence and stress incontinence. Urge incontinence means that the need to urinate comes on so fast that you can't get to a toilet in time. Stress incontinence means that you leak urine because of pressure on your bladder. For example, it may happen when you laugh, cough, or lift something heavy.  Bladder training can increase how long you can wait before you have to urinate. It can also help your bladder hold more urine. And it can give you better control over the urge to urinate.  It is important to remember that bladder training takes a few weeks to a few months to make a difference. You may not see results right away, but don't give up.  Follow-up care is a key part of your treatment and safety. Be sure to make and go to all appointments, and call your doctor if you are having problems. It's also a good idea to know your test results and keep a list of the medicines you take.  How can you care for yourself at home?  Work with your doctor to come up with a bladder training program that is right for you. You may use one or more of the following methods.  Delayed urination  In the beginning, try to keep from urinating for 5 minutes after you first feel the need to go.  While you wait, take " "deep, slow breaths to relax. Kegel exercises can also help you delay the need to go to the bathroom.  After some practice, when you can easily wait 5 minutes to urinate, try to wait 10 minutes before you urinate.  Slowly increase the waiting period until you are able to control when you have to urinate.  Scheduled urination  Empty your bladder when you first wake up in the morning.  Schedule times throughout the day when you will urinate.  Start by going to the bathroom every hour, even if you don't need to go.  Slowly increase the time between trips to the bathroom.  When you have found a schedule that works well for you, keep doing it.  If you wake up during the night and have to urinate, do it. Apply your schedule to waking hours only.  Kegel exercises  These tighten and strengthen pelvic muscles, which can help you control the flow of urine. (If doing these exercises causes pain, stop doing them and talk with your doctor.) To do Kegel exercises:  Squeeze your muscles as if you were trying not to pass gas. Or squeeze your muscles as if you were stopping the flow of urine. Your belly, legs, and buttocks shouldn't move.  Hold the squeeze for 3 seconds, then relax for 5 to 10 seconds.  Start with 3 seconds, then add 1 second each week until you are able to squeeze for 10 seconds.  Repeat the exercise 10 times a session. Do 3 to 8 sessions a day.  When should you call for help?  Watch closely for changes in your health, and be sure to contact your doctor if:    Your incontinence is getting worse.     You do not get better as expected.   Where can you learn more?  Go to https://www.[employer].net/patiented  Enter V684 in the search box to learn more about \"Bladder Training: Care Instructions.\"  Current as of: April 30, 2024  Content Version: 14.4    3840-2846 [employer].   Care instructions adapted under license by your healthcare professional. If you have questions about a medical condition or this " instruction, always ask your healthcare professional. [employer], [facility] disclaims any warranty or liability for your use of this information.

## 2025-06-02 NOTE — LETTER
My Heart Failure Action Plan  Name: Brenna Watkins   YOB: 1935  Date: 6/2/2025   My doctor:   Jocelin Guajardo [address]  [place] [address] [phone] My Diagnosis: HF-pEF (EF > 40%)  My Ejection Fraction:   Lab Results   Component Value Date    LVEF 55-60% 05/01/2025     Over 50%  My Exercise Goal: Start exercise slowly - to begin, do a few minutes of exercise, several times a day. Increase your time and speed [id number] to build tolerance, with a goal of 30 minutes of exercise daily. Steady, slow, and consistent exercise is both safe and healthy. Stop and rest when you feel tired or become short of breath. Do not push yourself on days when you don t feel well.       My Weight Plan:   Wt Readings from Last 2 Encounters:   06/02/25 70.8 kg (156 lb)   05/12/25 72.1 kg (159 lb)     Weigh yourself daily using the same scale. If you gain more than 2 pounds in 24 hours or 5 pounds in 7 days. call the clinic    My Diet Goal: No added salt    Emergency Room Visits:    Our goal is to improve your quality of life and help you avoid a visit to the emergency room or hospital.  If we work together, we can achieve this goal. But, if you feel you need to call 911 or go to the emergency room, please do so.  If you go to the emergency room, please bring your list of medicines and your daily weight chart with you.    Each day ask yourself:  Is my weight up?  Do I have swelling?  Do I have trouble breathing?  How did I sleep?  Other problems?       GREEN ZONE     Weight gained is no more than 2 pounds a day or 5 pounds a in 7 days.  No swelling in feet, ankles, legs or stomach.  No more swelling than usual.  No more trouble breathing than usual.  No change in my sleep.  No other problems. What should I do?  I am doing fine. I will take my medicine, follow my diet, see my doctor, exercise, and watch for symptoms.           YELLOW ZONE         Weight  gain of more than 2 pounds in one day or 5 pounds in 7 days.  New swelling in ankle, leg, knee or thigh.  Bloating in belly, pants feel tighter.  Swelling in hands or face.  Coughing or trouble breathing while walking or talking.  Harder to breathe last night.  Have trouble sleeping, wake up short of breath.  Unusually tired.  Not eating.  Nausea, vomiting, or diarrhea  Pain in my chest or bad  leg cramps.  Feel weak or dizzy. What should I do?  I need to take action and call my doctor or nurse today.                 RED ZONE         Weight gain of 5 pounds overnight.  Chest pain or pressure that does not go away.  Feel less alert.  Wheezing or have trouble breathing when at rest.  Cannot sleep lying down.  Cannot take my medicines.  Pass out or faint. What should I do?  I need to call my doctor or nurse now!  Call 911 if I have chest pain or cannot breathe.

## 2025-06-02 NOTE — PROGRESS NOTES
"Preventive Care Visit  [facility] JUAN Guajardo MD, Family Medicine  Jun 2, 2025      Assessment & Plan     Encounter for Medicare annual wellness exam    Low bone mass  Side effects to alendronate; alternate medication declined by patient   - calcium carbonate-vitamin D (OSCAL) 500-5 MG-MCG tablet; Take 1 tablet by mouth 2 times daily.  - Vitamin D Deficiency; Future  - Parathyroid Hormone Intact; Future  - Basic metabolic panel; Future    Coronary artery disease of native artery of native heart with stable angina pectoris  Patient is worried she cannot walk on a treadmill, so previously ordered stress test ordered as dobutamine echocardiogram instead; may follow-up with her cardiologist   - aspirin (ASA) 81 MG chewable tablet; Take 1 tablet (81 mg) by mouth daily.  - atorvastatin (LIPITOR) 40 MG tablet; Take 1 tablet (40 mg) by mouth daily.  - Echocardiogram Dobutamine Stress; Future  - Basic metabolic panel; Future    Hypertension goal BP (blood pressure) < 140/90  Uncontrolled; Discussed risks and benefits of this medication.  Follow up in one month for blood pressure and BMP or sooner for worsening of symptoms or side effects.   - lisinopril (ZESTRIL) 10 MG tablet; Take 1 tablet (10 mg) by mouth daily.  - Basic metabolic panel; Future    Hyperlipidemia LDL goal <70  Well controlled with medication   - CK total; Future    Need for vaccination  - zoster vaccine recombinant adjuvanted (SHINGRIX) injection; Inject 0.5 mLs into the muscle once for 1 dose. Pharmacist administered  - Tdap, tetanus-diptheria-acell pertussis, (BOOSTRIX) 5-2.5-18.5 LF-MCG/0.5 SANG injection; Inject 0.5 mLs into the muscle once for 1 dose.  - RSV vaccine, bivalent, ABRYSVO, injection; Inject 0.5 mLs into the muscle once for 1 dose. Pharmacist administered            BMI  Estimated body mass index is 28.89 kg/m  as calculated from the following:    Height as of this encounter: 1.565 m (5' 1.61\").    Weight as of " this encounter: 70.8 kg (156 lb).       Counseling  Appropriate preventive services were addressed with this patient via screening, questionnaire, or discussion as appropriate for fall prevention, nutrition, physical activity, Tobacco-use cessation, social engagement, weight loss and cognition.  Checklist reviewing preventive services available has been given to the patient.  Reviewed patient's diet, addressing concerns and/or questions.   The patient was instructed to see the dentist every 6 months.   Discussed possible causes of fatigue. Information on urinary incontinence and treatment options given to patient.     The longitudinal plan of care for the diagnosis(es)/condition(s) as documented were addressed during this visit. Due to the added complexity in care, I will continue to support Brenna in the subsequent management and with ongoing continuity of care.      Follow-up  Return in about 2 weeks (around 6/16/2025) for free nurse only blood pressure check, lab only check.    Mora Ceja is a 89 year old, presenting for the following:  Physical        6/2/2025    10:05 AM   Additional Questions   Roomed by Shanda LARA CMA   Accompanied by Self         6/2/2025    10:05 AM   Patient Reported Additional Medications   Patient reports taking the following new medications None          HPI  Hypercholesterolemia well controlled with current treatment plan without side effects.      Discuss stress test. Doesn't want to do it because it is hard to walk. She has ongoing dyspnea on exertion. Accompanying symptoms of woody bipedal edema; she prefers an over-the-counter diuretic.  Cardiology note reviewed.       Advance Care Planning    Patient states has Health Care Directive and will send to [employer].        6/2/2025   General Health   How would you rate your overall physical health? (!) FAIR   Feel stress (tense, anxious, or unable to sleep) Only a little   (!) STRESS CONCERN      6/2/2025   Nutrition   Diet:  Regular (no restrictions)         6/2/2025   Exercise   Days per week of moderate/strenous exercise 5 days         6/2/2025   Social Factors   Frequency of gathering with friends or relatives Twice a week   Worry food won't last until get money to buy more No   Food not last or not have enough money for food? No   Do you have housing? (Housing is defined as stable permanent housing and does not include staying outside in a car, in a tent, in an abandoned building, in an overnight shelter, or couch-surfing.) No   Are you worried about losing your housing? No   Lack of transportation? No   Unable to get utilities (heat,electricity)? No   Want help with housing or utility concern? No   (!) HOUSING CONCERN PRESENT      6/2/2025   Fall Risk   Fallen 2 or more times in the past year? Yes   Trouble with walking or balance? Yes   Gait Speed Test (Document in seconds) 4.03   Gait Speed Test Interpretation Less than or equal to 5.00 seconds - PASS          6/2/2025   Activities of Daily Living- Home Safety   Needs help with the following daily activites None of the above   Safety concerns in the home None of the above         6/2/2025   Dental   Dentist two times every year? (!) NO         6/2/2025   Hearing Screening   Hearing concerns? None of the above         6/2/2025   Driving Risk Screening   Patient/family members have concerns about driving No         6/2/2025   General Alertness/Fatigue Screening   Have you been more tired than usual lately? (!) YES         6/2/2025   Urinary Incontinence Screening   Bothered by leaking urine in past 6 months Yes         Today's PHQ-2 Score:       6/2/2025    10:02 AM   PHQ-2 ( 1999 [employer])   Q1: Little interest or pleasure in doing things 1   Q2: Feeling down, depressed or hopeless 0   PHQ-2 Score 1    Q1: Little interest or pleasure in doing things Several days   Q2: Feeling down, depressed or hopeless Not at all   PHQ-2 Score 1       Patient-reported           6/2/2025   Substance  Use   Alcohol more than 3/day or more than 7/wk No   Do you have a current opioid prescription? No   How severe/bad is pain from 1 to 10? 7/10   Do you use any other substances recreationally? No     Social History     Tobacco Use    Smoking status: Never     Passive exposure: Never    Smokeless tobacco: Never   Vaping Use    Vaping status: Never Used   Substance Use Topics    Alcohol use: Yes     Alcohol/week: 1.0 - 2.0 standard drink of alcohol     Types: 1 - 2 Glasses of wine per week    Drug use: No          Mammogram Screening - After age 74- determine frequency with patient based on health status, life expectancy and patient goals          Reviewed and updated as needed this visit by Provider   Tobacco  Allergies  Meds  Problems  Med Hx  Surg Hx  Fam Hx     Sexual Activity          Patient Active Problem List   Diagnosis    CTS (carpal tunnel syndrome)    Hypertension goal BP (blood pressure) < 140/90    Low bone mass    GERD without esophagitis    Lumbar radiculopathy    Cerebral aneurysm, nonruptured    CAD (coronary artery disease)    Hyperlipidemia LDL goal <70    DDD (degenerative disc disease), cervical    DJD (degenerative joint disease) of knee     Past Surgical History:   Procedure Laterality Date    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    APPENDECTOMY      AS REVISE ULNAR NERVE AT ELBOW  2018    BACK SURGERY  2015    CARPAL TUNNEL RELEASE RT/LT Right     CRANIOTOMY, REPAIR ANEURYSM, COMBINED      CV CORONARY ANGIOGRAM N/A 06/01/2021    Procedure: CV CORONARY ANGIOGRAM;  Surgeon: Syed Zayas MD;  Location: [facility] CARDIAC CATH LAB    CV PCI STENT DRUG ELUTING N/A 06/01/2021    Procedure: Percutaneous Coronary Intervention Stent Drug Eluting;  Surgeon: Syed Zayas MD;  Location: [facility] CARDIAC CATH LAB    IR CAROTID ANGIOGRAM  01/26/2012    IR CAROTID ANGIOGRAM  01/26/2012    IR MISCELLANEOUS PROCEDURE  01/26/2012    STRIP VEIN      TOE SURGERY  03/2018      "  Social History     Tobacco Use    Smoking status: Never     Passive exposure: Never    Smokeless tobacco: Never   Substance Use Topics    Alcohol use: Yes     Alcohol/week: 1.0 - 2.0 standard drink of alcohol     Types: 1 - 2 Glasses of wine per week     Family History   Problem Relation Age of Onset    Sudden Death Mother         d. childbirth     Alcoholism Father 62    Alzheimer Disease Sister     Myocardial Infarction Brother 68    Diabetes No family hx of     Cancer No family hx of          Current providers sharing in care for this patient include:  Patient Care Team:  Jocelin Guajardo MD as PCP - General (Family Practice)  Jocelin Guajardo MD as Assigned PCP  Hananh Condon RN as Registered Nurse (Cardiology)  Gil Willson MD as Assigned Heart and Vascular Provider    The following health maintenance items are reviewed in Epic and correct as of today:  Health Maintenance   Topic Date Due    HF ACTION PLAN  Never done    DTAP/TDAP/TD VACCINE (1 - Tdap) Never done    ZOSTER VACCINE (1 of 2) Never done    RSV VACCINE (1 - 1-dose 75+ series) Never done    COVID-19 VACCINE (4 - 2024-25 season) 09/01/2024    BMP  10/11/2025    ALT  04/11/2026    LIPID  04/11/2026    CBC  04/11/2026    MEDICARE ANNUAL WELLNESS VISIT  06/02/2026    ANNUAL REVIEW OF HM ORDERS  06/02/2026    FALL RISK ASSESSMENT  06/02/2026    DEXA  07/03/2026    ADVANCE CARE PLANNING  06/02/2030    TSH W/FREE T4 REFLEX  Completed    PHQ-2 (once per calendar year)  Completed    INFLUENZA VACCINE  Completed    PNEUMOCOCCAL VACCINE 50+ YEARS  Completed    HPV VACCINE  Aged Out    MENINGITIS VACCINE  Aged Out            Objective    Exam  BP (!) 167/83 (BP Location: Left arm, Patient Position: Sitting, Cuff Size: Adult Regular)   Pulse 74   Temp 97.9  F (36.6  C) (Oral)   Resp 20   Ht 1.565 m (5' 1.61\")   Wt 70.8 kg (156 lb)   SpO2 95%   BMI 28.89 kg/m     Estimated body mass index is 28.89 kg/m  as calculated from the " "following:    Height as of this encounter: 1.565 m (5' 1.61\").    Weight as of this encounter: 70.8 kg (156 lb).    Physical Exam  GENERAL: alert and no distress  EYES: Eyes grossly normal to inspection, PERRL and conjunctivae and sclerae normal  HENT: ear canals and TM's normal, nose and mouth without ulcers or lesions  NECK: no adenopathy, no asymmetry, masses, or scars  RESP: lungs clear to auscultation - no rales, rhonchi or wheezes  CV: regular rate and rhythm, normal S1 S2, no S3 or S4, no murmur, click or rub   ABDOMEN: soft, nontender, no hepatosplenomegaly, no masses and bowel sounds normal  MS: woody bipedal edema   SKIN: no suspicious lesions or rashes  NEURO: Normal strength and tone, mentation intact and speech normal  PSYCH: mentation appears normal, affect normal/bright  Gait and balance assessed per Gait Speed Test.  Result as above.        6/2/2025   Mini Cog   Clock Draw Score 2 Normal   3 Item Recall 3 objects recalled   Mini Cog Total Score 5              Signed Electronically by: Jocelin Guajardo MD    "

## 2025-06-25 ENCOUNTER — TELEPHONE (OUTPATIENT)
Dept: FAMILY MEDICINE | Facility: CLINIC | Age: 89
End: 2025-06-25
Payer: COMMERCIAL

## 2025-06-25 NOTE — TELEPHONE ENCOUNTER
Error scheduling had a question regarding scheduling a stress test.    Advised to call cardiology for scheduling this.    Ivania ROBERTSN RN  Triage Nurse  [facility]

## 2025-06-26 ENCOUNTER — TELEPHONE (OUTPATIENT)
Dept: FAMILY MEDICINE | Facility: CLINIC | Age: 89
End: 2025-06-26
Payer: COMMERCIAL

## 2025-06-26 DIAGNOSIS — I25.118 CORONARY ARTERY DISEASE OF NATIVE ARTERY OF NATIVE HEART WITH STABLE ANGINA PECTORIS: Primary | ICD-10-CM

## 2025-06-26 DIAGNOSIS — R06.09 DYSPNEA ON EXERTION: ICD-10-CM

## 2025-06-26 NOTE — TELEPHONE ENCOUNTER
----- Message from Radha LOMBARDI sent at 6/26/2025  7:13 AM CDT -----  Regarding: DOBUTAMINE STRESS ECHO  Hi Dr. Guajardo,     Dobutamine Stress Echo can only be ordered by a Cardiologist. This test has been replaced with NM Lexiscan. Please place new order or advise. Patient is waiting to be scheduled.     Radha Cordero    [facility]

## 2025-07-03 ENCOUNTER — RESULTS FOLLOW-UP (OUTPATIENT)
Dept: FAMILY MEDICINE | Facility: CLINIC | Age: 89
End: 2025-07-03

## 2025-07-03 ENCOUNTER — HOSPITAL ENCOUNTER (OUTPATIENT)
Dept: CARDIOLOGY | Facility: HOSPITAL | Age: 89
End: 2025-07-03
Attending: FAMILY MEDICINE
Payer: COMMERCIAL

## 2025-07-03 PROCEDURE — 250N000011 HC RX IP 250 OP 636: Performed by: FAMILY MEDICINE

## 2025-07-03 RX ORDER — REGADENOSON 0.08 MG/ML
0.4 INJECTION, SOLUTION INTRAVENOUS ONCE
Status: COMPLETED | OUTPATIENT
Start: 2025-07-03 | End: 2025-07-03

## 2025-07-03 RX ORDER — AMINOPHYLLINE 25 MG/ML
50-100 INJECTION, SOLUTION INTRAVENOUS
Status: ACTIVE | OUTPATIENT
Start: 2025-07-03

## 2025-07-03 RX ADMIN — REGADENOSON 0.4 MG: 0.08 INJECTION, SOLUTION INTRAVENOUS at 10:00

## 2025-07-08 ENCOUNTER — VIRTUAL VISIT (OUTPATIENT)
Dept: FAMILY MEDICINE | Facility: CLINIC | Age: 89
End: 2025-07-08
Payer: COMMERCIAL

## 2025-07-08 ENCOUNTER — LAB (OUTPATIENT)
Dept: LAB | Facility: CLINIC | Age: 89
End: 2025-07-08
Payer: COMMERCIAL

## 2025-07-08 DIAGNOSIS — M85.80 LOW BONE MASS: ICD-10-CM

## 2025-07-08 DIAGNOSIS — I67.1 CEREBRAL ANEURYSM, NONRUPTURED: ICD-10-CM

## 2025-07-08 DIAGNOSIS — R42 DIZZINESS: Primary | ICD-10-CM

## 2025-07-08 DIAGNOSIS — E78.5 HYPERLIPIDEMIA LDL GOAL <70: ICD-10-CM

## 2025-07-08 DIAGNOSIS — I25.118 CORONARY ARTERY DISEASE OF NATIVE ARTERY OF NATIVE HEART WITH STABLE ANGINA PECTORIS: ICD-10-CM

## 2025-07-08 DIAGNOSIS — I10 HYPERTENSION GOAL BP (BLOOD PRESSURE) < 140/90: ICD-10-CM

## 2025-07-08 LAB
ANION GAP SERPL CALCULATED.3IONS-SCNC: 10 MMOL/L (ref 7–15)
BUN SERPL-MCNC: 22.4 MG/DL (ref 8–23)
CALCIUM SERPL-MCNC: 10.2 MG/DL (ref 8.8–10.4)
CHLORIDE SERPL-SCNC: 105 MMOL/L (ref 98–107)
CK SERPL-CCNC: 76 U/L (ref 26–192)
CREAT SERPL-MCNC: 1.01 MG/DL (ref 0.51–0.95)
EGFRCR SERPLBLD CKD-EPI 2021: 53 ML/MIN/1.73M2
GLUCOSE SERPL-MCNC: 90 MG/DL (ref 70–99)
HCO3 SERPL-SCNC: 25 MMOL/L (ref 22–29)
POTASSIUM SERPL-SCNC: 4.6 MMOL/L (ref 3.4–5.3)
PTH-INTACT SERPL-MCNC: 38 PG/ML (ref 15–65)
SODIUM SERPL-SCNC: 140 MMOL/L (ref 135–145)
VIT D+METAB SERPL-MCNC: 63 NG/ML (ref 20–50)

## 2025-07-08 PROCEDURE — 82550 ASSAY OF CK (CPK): CPT

## 2025-07-08 PROCEDURE — 36415 COLL VENOUS BLD VENIPUNCTURE: CPT

## 2025-07-08 PROCEDURE — 80048 BASIC METABOLIC PNL TOTAL CA: CPT

## 2025-07-08 PROCEDURE — 82306 VITAMIN D 25 HYDROXY: CPT

## 2025-07-08 PROCEDURE — 83970 ASSAY OF PARATHORMONE: CPT

## 2025-07-08 PROCEDURE — 98013 SYNCH AUDIO-ONLY EST LOW 20: CPT

## 2025-07-08 RX ORDER — ACETAMINOPHEN 500 MG
500-1000 TABLET ORAL EVERY 6 HOURS PRN
COMMUNITY

## 2025-07-08 NOTE — PROGRESS NOTES
"Brenna is a 89 year old who is being evaluated via a billable telephone visit.    What phone number would you like to be contacted at? [phone]  How would you like to obtain your AVS? Mail a copy  Originating Location (pt. Location): Home    Distant Location (provider location):  On-site  Telephone visit completed due to the patient did not consent to a video visit.    Assessment & Plan     Dizziness  Dizziness and imbalance started a week after fall on May 11.  She reports she has had increased dizziness and imbalance since that fall. Denies headaches, no N/V, no unilateral weakness or change to sensation noted by patient.  Patient did not have loss of consciousness.  Patient did not have palpable contusion from fall.  Patient had previous brain aneurysm and she is concerned about this.  She reports she gets yearly MRIs for monitoring although no record of this is able to be reviewed in her chart.  Last neurology visit in EHR is from 2012, patient is not able to tell me the name of additional neurologist that she may have been seeing.    Patient advised should be seen in clinic for more thorough evaluation and appointment at clinic near where she lives ([facility]) is scheduled for patient tomorrow.    Cerebral Aneurysm  History of cerebral aneurysm dx after presented to [facility] on 10/14/11. 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 A repeat head MRA showed successful coiled thrombosis of the right ICA/MCA aneurysm           Subjective   Brenna is a 89 year old, presenting for the following health issues:  Follow Up (Fell back in May resulting a head injury. She hit the back of her head. Getting dizzy while laying down. Walking different and lightheaded. Requesting an MRI. \"I just feel funny and not walking straight\")    HPI                  Objective           Vitals:  No vitals were obtained today due " to virtual visit.    Physical Exam   General: Alert and no distress //Respiratory: No audible wheeze, cough, or shortness of breath // Psychiatric:  Appropriate affect, tone, and pace of words            Phone call duration: 12 minutes  Signed Electronically by: MARTY Sim CNP

## 2025-07-09 ENCOUNTER — OFFICE VISIT (OUTPATIENT)
Dept: FAMILY MEDICINE | Facility: CLINIC | Age: 89
End: 2025-07-09
Payer: COMMERCIAL

## 2025-07-09 VITALS
HEIGHT: 62 IN | OXYGEN SATURATION: 94 % | WEIGHT: 154 LBS | BODY MASS INDEX: 28.34 KG/M2 | RESPIRATION RATE: 15 BRPM | DIASTOLIC BLOOD PRESSURE: 66 MMHG | SYSTOLIC BLOOD PRESSURE: 122 MMHG | HEART RATE: 88 BPM | TEMPERATURE: 97.9 F

## 2025-07-09 DIAGNOSIS — M85.80 LOW BONE MASS: ICD-10-CM

## 2025-07-09 DIAGNOSIS — R42 DIZZINESS: Primary | ICD-10-CM

## 2025-07-09 DIAGNOSIS — I67.1 CEREBRAL ANEURYSM, NONRUPTURED: ICD-10-CM

## 2025-07-09 PROCEDURE — 3074F SYST BP LT 130 MM HG: CPT | Performed by: NURSE PRACTITIONER

## 2025-07-09 PROCEDURE — 99214 OFFICE O/P EST MOD 30 MIN: CPT | Performed by: NURSE PRACTITIONER

## 2025-07-09 PROCEDURE — 1125F AMNT PAIN NOTED PAIN PRSNT: CPT | Performed by: NURSE PRACTITIONER

## 2025-07-09 PROCEDURE — 3078F DIAST BP <80 MM HG: CPT | Performed by: NURSE PRACTITIONER

## 2025-07-09 ASSESSMENT — ENCOUNTER SYMPTOMS: SHORTNESS OF BREATH: 1

## 2025-07-09 ASSESSMENT — PAIN SCALES - GENERAL: PAINLEVEL_OUTOF10: SEVERE PAIN (8)

## 2025-07-09 NOTE — PROGRESS NOTES
Assessment & Plan     1. Dizziness (Primary)  S/p Fall 5/11/25 without LOC with ongoing dizziness, unsteadiness, concentration deficit, and fatigue. She is quite concerned that her prior cerebral anuerysm repair has been compromised.    Patient is afebrile and in no acute distress with clear lung sounds.  Orthostatics normal today. Neuro exam non-focal.    Check CT given ongoing symptoms and surgical hx. Avoid dehydration and rise slowly.  We also discussed possibility of concussion- discussed supportive measures.  If imaging normal, could trial OTC meclizine.  Instructed to go to ED if symptoms worsen.    - CT Head w/o Contrast; Future    2. Cerebral aneurysm, nonruptured  As noted above.  - CT Head w/o Contrast; Future    3. Low bone mass  Reviewed labs from yesterday. Vit D elevated, decrease oscal from BID to every day.   - calcium carbonate-vitamin D (OSCAL) 500-5 MG-MCG tablet; Take 1 tablet by mouth daily.      Follow-up if symptoms worsen/fail to improve.    All questions/concerns addressed. Patient stated understanding/agreement to plan of care.        Note: Chart documentation was done in part with Dragon Voice Recognition software.  Although reviewed after completion, some word and grammatical errors may remain. Please contact author for any clarification or concerns.        Mora Ceja is a 89 year old, presenting for the following health issues:  Fall (On Mothers day and having some dizziness)    Fall  Pertinent negatives include no chest pain.   History of Present Illness       Reason for visit:  Fall on Mothres day  Symptom onset:  More than a month  Symptoms include:  Dizziness  Symptom intensity:  Moderate  Symptom progression:  Staying the same  Had these symptoms before:  No  What makes it worse:  Nothing  What makes it better:  Nothing   She is taking medications regularly.          Fall on Mothers day where she hit back of head. No LOC. Has been having dizziness and imbalance since  "approx a week after the fall. Denies vertigo. No focal headaches but head feels \"different and it's not me.\" Admits to difficulty concentrating and feeling tired.   Called her neurosurgery team, they reassured her that the fall shouldn't impact her previous surgery (coils for cerebral aneurysm).  SOBOE, chronic and ongoing. Stress test 7/3/25 negative.  She is quite concerned that her prior cerebral anuerysm repair has been compromised.  No other acute concerns/symptoms at time of exam.          Review of Systems   Respiratory:  Positive for shortness of breath (with stairs).    Cardiovascular:  Negative for chest pain.   Constitutional, HEENT, cardiovascular, pulmonary, gi and gu systems are negative, except as otherwise noted.              Current Outpatient Medications   Medication Sig Dispense Refill    acetaminophen (TYLENOL) 500 MG tablet Take 500-1,000 mg by mouth every 6 hours as needed for mild pain.      aspirin (ASA) 81 MG chewable tablet Take 1 tablet (81 mg) by mouth daily. 90 tablet 4    atorvastatin (LIPITOR) 40 MG tablet Take 1 tablet (40 mg) by mouth daily. 90 tablet 4    calcium carbonate-vitamin D (OSCAL) 500-5 MG-MCG tablet Take 1 tablet by mouth 2 times daily. 180 tablet 4    diphenhydrAMINE-acetaminophen (TYLENOL PM)  MG tablet Take 2 tablets by mouth nightly as needed for sleep      lisinopril (ZESTRIL) 10 MG tablet Take 1 tablet (10 mg) by mouth daily. 90 tablet 4    multivitamin w/minerals (THERA-VIT-M) tablet Take 1 tablet by mouth daily      Omeprazole (PRILOSEC PO) Take 20 mg by mouth daily (Patient not taking: Reported on 7/9/2025)       No current facility-administered medications for this visit.       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    APPENDECTOMY      AS REVISE ULNAR NERVE AT ELBOW  2018    BACK SURGERY  2015    CARPAL TUNNEL RELEASE RT/LT Right     CRANIOTOMY, REPAIR ANEURYSM, COMBINED      CV CORONARY ANGIOGRAM N/A 06/01/2021    Procedure: CV CORONARY ANGIOGRAM;  Surgeon: Syed Zayas MD;  Location:  [facility] C[facility]    CV PCI STENT DRUG ELUTING N/A 06/01/2021    Procedure: Percutaneous Coronary Intervention Stent Drug Eluting;  Surgeon: Syed Zayas MD;  Location:  [facility] CATH LAB    IR CAROTID ANGIOGRAM  01/26/2012    IR CAROTID ANGIOGRAM  01/26/2012    IR MISCELLANEOUS PROCEDURE  01/26/2012    STRIP VEIN      TOE SURGERY  03/2018       Family History   Problem Relation Age of Onset    Sudden Death Mother         d. childbirth     Alcoholism Father 62    Alzheimer Disease Sister     Myocardial Infarction Brother 68    Diabetes No family hx of     Cancer No family hx of        Social History     Tobacco Use    Smoking status: Never     Passive exposure: Never    Smokeless tobacco: Never   Substance Use Topics    Alcohol use: Yes     Alcohol/week: 1.0 - 2.0 standard drink of alcohol     Types: 1 - 2 Glasses of wine per week         Objective      /66 (BP Location: Left arm, Patient Position: Sitting, Cuff Size: Adult Regular)   Pulse 88   Temp 97.9  F (36.6  C) (Temporal)   Resp 15   Ht 1.575 m (5' 2\")   Wt 69.9 kg (154 lb)   LMP  (LMP Unknown)   SpO2 94%   BMI 28.17 kg/m        Physical Exam  Constitutional:       General: She is not in acute distress.     Appearance: She is not ill-appearing.   HENT:      Right Ear: There is impacted cerumen.      Left Ear: There is impacted cerumen.      Ears:      Comments: Visualized portions of TM normal bilaterally.     Nose: No rhinorrhea.      Mouth/Throat:      Pharynx: " Oropharynx is clear.   Eyes:      Extraocular Movements: Extraocular movements intact.      Pupils: Pupils are equal, round, and reactive to light.   Cardiovascular:      Rate and Rhythm: Normal rate and regular rhythm.      Heart sounds: Normal heart sounds. No murmur heard.  Pulmonary:      Effort: No respiratory distress.      Breath sounds: Normal breath sounds. No wheezing or rales.   Musculoskeletal:      Cervical back: Neck supple.      Right lower leg: No edema.      Left lower leg: No edema.   Lymphadenopathy:      Cervical: No cervical adenopathy.   Neurological:      General: No focal deficit present.      Mental Status: She is alert.      Motor: No weakness.      Gait: Gait normal.   Psychiatric:         Behavior: Behavior normal.                Sitting /62 3:06pm  Standing /70 3:09pm    Signed Electronically by: MARTY Yi CNP  I spent a total of 30 minutes on the day of the visit. Time spent by me includes doing chart review, history and exam, documentation and further activities per the note

## 2025-07-09 NOTE — PATIENT INSTRUCTIONS
- Avoid dehydration  -Rise slowly  - If CT head is normal, consider trial of over the counter meclizine  -If symptoms worsen, go to emergency department

## 2025-07-22 ENCOUNTER — HOSPITAL ENCOUNTER (OUTPATIENT)
Dept: CT IMAGING | Facility: HOSPITAL | Age: 89
Discharge: HOME | End: 2025-07-22
Attending: NURSE PRACTITIONER
Payer: COMMERCIAL

## 2025-07-22 DIAGNOSIS — R42 DIZZINESS: ICD-10-CM

## 2025-07-22 DIAGNOSIS — I67.1 CEREBRAL ANEURYSM, NONRUPTURED: ICD-10-CM

## 2025-07-22 PROCEDURE — 70450 CT HEAD/BRAIN W/O DYE: CPT

## 2025-07-23 ENCOUNTER — RESULTS FOLLOW-UP (OUTPATIENT)
Dept: FAMILY MEDICINE | Facility: CLINIC | Age: 89
End: 2025-07-23
Payer: COMMERCIAL

## 2025-07-23 DIAGNOSIS — R42 DIZZINESS: Primary | ICD-10-CM

## 2025-07-23 RX ORDER — MECLIZINE HCL 12.5 MG 12.5 MG/1
12.5 TABLET ORAL 2 TIMES DAILY PRN
Qty: 10 TABLET | Refills: 0 | Status: SHIPPED | OUTPATIENT
Start: 2025-07-23

## 2025-07-23 NOTE — RESULT ENCOUNTER NOTE
RN called patient/family and relayed provider's message. Patient/family verbalized understanding. She will try the meclizine and let us know if it improves her symptoms. Does not want referral for now.     Maria Eugenia LOPEZ RN, BSN  [facility]: [place]

## 2025-08-06 ENCOUNTER — TRANSFERRED RECORDS (OUTPATIENT)
Dept: HEALTH INFORMATION MANAGEMENT | Facility: CLINIC | Age: 89
End: 2025-08-06
Payer: COMMERCIAL

## (undated) DEVICE — KIT HAND CONTROL ACIST 014644 AR-P54

## (undated) DEVICE — PACK HEART LEFT CUSTOM

## (undated) DEVICE — CATH ANGIO INFINITI 3DRC 4FRX100CM 538476

## (undated) DEVICE — INTRO GLIDESHEATH SLENDER 6FR 10X45CM 60-1060

## (undated) DEVICE — CATH GUIDING BLUE YELLOW PTFE 3DRC 6FRX100CM 67013000

## (undated) DEVICE — FASTENER CATH BALLOON CLAMPX2 STATLOCK 0684-00-493

## (undated) DEVICE — GW VASC .035IN DIA 260CML 7CML 3 MM RADIUS J CURVE 502455

## (undated) DEVICE — CATH ANGIO INFINITI JL5 4FRX100CM 538422

## (undated) DEVICE — MANIFOLD KIT ANGIO AUTOMATED 014613

## (undated) DEVICE — CATH ANGIO INFINITI JL4 4FRX100CM 538420

## (undated) DEVICE — INTRO SHEATH 6FRX25CM PINNACLE RSS606

## (undated) DEVICE — TUBING PRESSURE 30"

## (undated) DEVICE — CATH DIAG 4FR JL 6.0  538424

## (undated) DEVICE — VALVE HEMOSTASIS .096" COPILOT MECH 1003331

## (undated) DEVICE — GUIDEWIRE VASC 0.014INX180CM RUNTHROUGH 25-1011

## (undated) DEVICE — Device

## (undated) DEVICE — CLOSURE ANGIOSEAL 6FR 610130

## (undated) DEVICE — INTRO SHEATH AVANTI 4FRX23CM 504604T

## (undated) DEVICE — CATH GUIDING BLUE YELLOW PTFE XB4 6FRX100CM 67005600

## (undated) DEVICE — KIT ACCESSORY INTRO INFLATION SYS 20/30 PRIORITY 1000186-115

## (undated) DEVICE — CATH BALLOON EMERGE 2.5X12MM H7493918912250

## (undated) RX ORDER — SODIUM CHLORIDE 9 MG/ML
INJECTION, SOLUTION INTRAVENOUS
Status: DISPENSED
Start: 2021-06-01

## (undated) RX ORDER — IVABRADINE 5 MG/1
TABLET, FILM COATED ORAL
Status: DISPENSED
Start: 2021-05-12

## (undated) RX ORDER — NITROGLYCERIN 5 MG/ML
VIAL (ML) INTRAVENOUS
Status: DISPENSED
Start: 2021-06-01

## (undated) RX ORDER — FENTANYL CITRATE 50 UG/ML
INJECTION, SOLUTION INTRAMUSCULAR; INTRAVENOUS
Status: DISPENSED
Start: 2021-06-01

## (undated) RX ORDER — ACETAMINOPHEN 325 MG/1
TABLET ORAL
Status: DISPENSED
Start: 2021-06-01

## (undated) RX ORDER — HEPARIN SODIUM 1000 [USP'U]/ML
INJECTION, SOLUTION INTRAVENOUS; SUBCUTANEOUS
Status: DISPENSED
Start: 2021-06-01

## (undated) RX ORDER — NITROGLYCERIN 0.4 MG/1
TABLET SUBLINGUAL
Status: DISPENSED
Start: 2021-05-12

## (undated) RX ORDER — METOPROLOL TARTRATE 100 MG
TABLET ORAL
Status: DISPENSED
Start: 2021-05-12

## (undated) RX ORDER — HYDRALAZINE HYDROCHLORIDE 20 MG/ML
INJECTION INTRAMUSCULAR; INTRAVENOUS
Status: DISPENSED
Start: 2021-06-01